# Patient Record
Sex: FEMALE | Race: BLACK OR AFRICAN AMERICAN | Employment: UNEMPLOYED | URBAN - METROPOLITAN AREA
[De-identification: names, ages, dates, MRNs, and addresses within clinical notes are randomized per-mention and may not be internally consistent; named-entity substitution may affect disease eponyms.]

---

## 2018-05-25 ENCOUNTER — HOSPITAL ENCOUNTER (EMERGENCY)
Age: 63
Discharge: STILL ADMITTED OR ADMINISTRATIVE | End: 2018-05-25
Attending: EMERGENCY MEDICINE
Payer: OTHER GOVERNMENT

## 2018-05-25 PROCEDURE — 75810000275 HC EMERGENCY DEPT VISIT NO LEVEL OF CARE

## 2019-03-31 ENCOUNTER — HOSPITAL ENCOUNTER (EMERGENCY)
Age: 64
Discharge: HOME | End: 2019-03-31
Attending: EMERGENCY MEDICINE | Admitting: EMERGENCY MEDICINE
Payer: COMMERCIAL

## 2019-03-31 ENCOUNTER — APPOINTMENT (OUTPATIENT)
Dept: CT IMAGING | Age: 64
End: 2019-03-31
Attending: EMERGENCY MEDICINE
Payer: COMMERCIAL

## 2019-03-31 VITALS
DIASTOLIC BLOOD PRESSURE: 79 MMHG | RESPIRATION RATE: 15 BRPM | OXYGEN SATURATION: 99 % | TEMPERATURE: 98 F | SYSTOLIC BLOOD PRESSURE: 147 MMHG | HEIGHT: 59 IN | HEART RATE: 68 BPM | WEIGHT: 150 LBS | BODY MASS INDEX: 30.24 KG/M2

## 2019-03-31 DIAGNOSIS — R10.32 ABDOMINAL PAIN, LLQ (LEFT LOWER QUADRANT): Primary | ICD-10-CM

## 2019-03-31 LAB
ALBUMIN SERPL-MCNC: 4.2 G/DL (ref 3.4–5)
ALBUMIN/GLOB SERPL: 1 {RATIO} (ref 0.8–1.7)
ALP SERPL-CCNC: 130 U/L (ref 45–117)
ALT SERPL-CCNC: 21 U/L (ref 13–56)
ANION GAP SERPL CALC-SCNC: 4 MMOL/L (ref 3–18)
APPEARANCE UR: CLEAR
AST SERPL-CCNC: 21 U/L (ref 15–37)
BASOPHILS # BLD: 0 K/UL (ref 0–0.1)
BASOPHILS NFR BLD: 0 % (ref 0–2)
BILIRUB SERPL-MCNC: 0.5 MG/DL (ref 0.2–1)
BILIRUB UR QL: NEGATIVE
BUN SERPL-MCNC: 14 MG/DL (ref 7–18)
BUN/CREAT SERPL: 19 (ref 12–20)
CALCIUM SERPL-MCNC: 8.4 MG/DL (ref 8.5–10.1)
CHLORIDE SERPL-SCNC: 107 MMOL/L (ref 100–108)
CO2 SERPL-SCNC: 26 MMOL/L (ref 21–32)
COLOR UR: YELLOW
CREAT SERPL-MCNC: 0.74 MG/DL (ref 0.6–1.3)
DIFFERENTIAL METHOD BLD: NORMAL
EOSINOPHIL # BLD: 0 K/UL (ref 0–0.4)
EOSINOPHIL NFR BLD: 1 % (ref 0–5)
EPITH CASTS URNS QL MICRO: NORMAL /LPF (ref 0–5)
ERYTHROCYTE [DISTWIDTH] IN BLOOD BY AUTOMATED COUNT: 13.7 % (ref 11.6–14.5)
GLOBULIN SER CALC-MCNC: 4.3 G/DL (ref 2–4)
GLUCOSE SERPL-MCNC: 74 MG/DL (ref 74–99)
GLUCOSE UR STRIP.AUTO-MCNC: NEGATIVE MG/DL
HCT VFR BLD AUTO: 42.1 % (ref 35–45)
HGB BLD-MCNC: 13.8 G/DL (ref 12–16)
HGB UR QL STRIP: ABNORMAL
KETONES UR QL STRIP.AUTO: ABNORMAL MG/DL
LEUKOCYTE ESTERASE UR QL STRIP.AUTO: NEGATIVE
LIPASE SERPL-CCNC: 93 U/L (ref 73–393)
LYMPHOCYTES # BLD: 1.9 K/UL (ref 0.9–3.6)
LYMPHOCYTES NFR BLD: 31 % (ref 21–52)
MCH RBC QN AUTO: 28.5 PG (ref 24–34)
MCHC RBC AUTO-ENTMCNC: 32.8 G/DL (ref 31–37)
MCV RBC AUTO: 86.8 FL (ref 74–97)
MONOCYTES # BLD: 0.4 K/UL (ref 0.05–1.2)
MONOCYTES NFR BLD: 6 % (ref 3–10)
NEUTS SEG # BLD: 3.9 K/UL (ref 1.8–8)
NEUTS SEG NFR BLD: 62 % (ref 40–73)
NITRITE UR QL STRIP.AUTO: NEGATIVE
PH UR STRIP: 5 [PH] (ref 5–8)
PLATELET # BLD AUTO: 366 K/UL (ref 135–420)
PMV BLD AUTO: 11.5 FL (ref 9.2–11.8)
POTASSIUM SERPL-SCNC: 4 MMOL/L (ref 3.5–5.5)
PROT SERPL-MCNC: 8.5 G/DL (ref 6.4–8.2)
PROT UR STRIP-MCNC: NEGATIVE MG/DL
RBC # BLD AUTO: 4.85 M/UL (ref 4.2–5.3)
RBC #/AREA URNS HPF: NORMAL /HPF (ref 0–5)
SODIUM SERPL-SCNC: 137 MMOL/L (ref 136–145)
SP GR UR REFRACTOMETRY: 1.02 (ref 1–1.03)
TROPONIN I SERPL-MCNC: <0.02 NG/ML (ref 0–0.04)
UROBILINOGEN UR QL STRIP.AUTO: 0.2 EU/DL (ref 0.2–1)
WBC # BLD AUTO: 6.3 K/UL (ref 4.6–13.2)
WBC URNS QL MICRO: NORMAL /HPF (ref 0–5)

## 2019-03-31 PROCEDURE — 84484 ASSAY OF TROPONIN QUANT: CPT

## 2019-03-31 PROCEDURE — 74011250636 HC RX REV CODE- 250/636: Performed by: EMERGENCY MEDICINE

## 2019-03-31 PROCEDURE — 96374 THER/PROPH/DIAG INJ IV PUSH: CPT

## 2019-03-31 PROCEDURE — 99284 EMERGENCY DEPT VISIT MOD MDM: CPT

## 2019-03-31 PROCEDURE — 83690 ASSAY OF LIPASE: CPT

## 2019-03-31 PROCEDURE — 74177 CT ABD & PELVIS W/CONTRAST: CPT

## 2019-03-31 PROCEDURE — 74011636320 HC RX REV CODE- 636/320: Performed by: EMERGENCY MEDICINE

## 2019-03-31 PROCEDURE — 96375 TX/PRO/DX INJ NEW DRUG ADDON: CPT

## 2019-03-31 PROCEDURE — 81001 URINALYSIS AUTO W/SCOPE: CPT

## 2019-03-31 PROCEDURE — 85025 COMPLETE CBC W/AUTO DIFF WBC: CPT

## 2019-03-31 PROCEDURE — 80053 COMPREHEN METABOLIC PANEL: CPT

## 2019-03-31 RX ORDER — MORPHINE SULFATE 4 MG/ML
4 INJECTION INTRAVENOUS
Status: DISCONTINUED | OUTPATIENT
Start: 2019-03-31 | End: 2019-03-31 | Stop reason: HOSPADM

## 2019-03-31 RX ORDER — MORPHINE SULFATE 4 MG/ML
4 INJECTION INTRAVENOUS
Status: COMPLETED | OUTPATIENT
Start: 2019-03-31 | End: 2019-03-31

## 2019-03-31 RX ORDER — ONDANSETRON 4 MG/1
8 TABLET, FILM COATED ORAL
Qty: 12 TAB | Refills: 0 | Status: SHIPPED | OUTPATIENT
Start: 2019-03-31

## 2019-03-31 RX ORDER — ONDANSETRON 2 MG/ML
4 INJECTION INTRAMUSCULAR; INTRAVENOUS
Status: COMPLETED | OUTPATIENT
Start: 2019-03-31 | End: 2019-03-31

## 2019-03-31 RX ORDER — DICYCLOMINE HYDROCHLORIDE 10 MG/1
10 CAPSULE ORAL 4 TIMES DAILY
Qty: 20 CAP | Refills: 0 | Status: SHIPPED | OUTPATIENT
Start: 2019-03-31 | End: 2019-04-05

## 2019-03-31 RX ADMIN — SODIUM CHLORIDE 1000 ML: 900 INJECTION, SOLUTION INTRAVENOUS at 09:02

## 2019-03-31 RX ADMIN — ONDANSETRON 4 MG: 2 INJECTION INTRAMUSCULAR; INTRAVENOUS at 09:04

## 2019-03-31 RX ADMIN — IOPAMIDOL 100 ML: 612 INJECTION, SOLUTION INTRAVENOUS at 09:58

## 2019-03-31 RX ADMIN — MORPHINE SULFATE 4 MG: 4 INJECTION INTRAVENOUS at 09:11

## 2019-03-31 NOTE — ED PROVIDER NOTES
EMERGENCY DEPARTMENT HISTORY AND PHYSICAL EXAM 
 
Date: 3/31/2019 Patient Name: [facility] History of Presenting Illness Chief Complaint Patient presents with  Abdominal Pain History Provided By: Patient and Boyfriend Additional History (Context): [facility] is a 66 y.o. female with hypertension and obesity who presents with left lower quadrant abdominal pain since late last night. Denies nausea vomiting or diarrhea. Does have a history of a hysterectomy. Is having normal bowel movements. Denies melena hematochezia dysuria fever or flank pain. Patient says she has never had a colonoscopy before. PCP: Steve Rodriguez MD 
 
Current Facility-Administered Medications Medication Dose Route Frequency Provider Last Rate Last Dose  morphine injection 4 mg  4 mg IntraVENous NOW Karin Vasquez PA Current Outpatient Medications Medication Sig Dispense Refill  ondansetron hcl (ZOFRAN) 4 mg tablet Take 2 Tabs by mouth every eight (8) hours as needed for Nausea. 12 Tab 0  
 dicyclomine (BENTYL) 10 mg capsule Take 1 Cap by mouth four (4) times daily for 5 days. 20 Cap 0 Past History Past Medical History: 
Past Medical History:  
Diagnosis Date  Hypertension Past Surgical History: 
History reviewed. No pertinent surgical history. Family History: 
History reviewed. No pertinent family history. Social History: 
Social History Tobacco Use  Smoking status: Current Every Day Smoker Packs/day: 1.00  Smokeless tobacco: Never Used Substance Use Topics  Alcohol use: Yes Comment: socially  Drug use: Never Allergies: Allergies Allergen Reactions  Benadryl [Diphenhydramine Hcl] Hives Review of Systems Review of Systems Constitutional: Negative for chills, diaphoresis, fatigue and fever. Respiratory: Negative for shortness of breath. Cardiovascular: Negative for chest pain. Gastrointestinal: Positive for abdominal pain. Negative for constipation, diarrhea, nausea and vomiting. All Other Systems Negative Physical Exam  
 
Vitals:  
 03/31/19 0832 03/31/19 0840 03/31/19 0900 BP: (!) 162/98 Pulse: 73 Resp: 12 Temp: 98.5 °F (36.9 °C) SpO2: 96% 97% 97% Weight: 68 kg (150 lb) Height: 4' 11\" (1.499 m) Physical Exam  
Constitutional: She is oriented to person, place, and time. She appears well-developed. HENT:  
Head: Normocephalic and atraumatic. Eyes: Pupils are equal, round, and reactive to light. Neck: No JVD present. No tracheal deviation present. No thyromegaly present. Cardiovascular: Normal rate, regular rhythm and normal heart sounds. Exam reveals no gallop and no friction rub. No murmur heard. Pulmonary/Chest: Effort normal and breath sounds normal. No stridor. No respiratory distress. She has no wheezes. She has no rales. She exhibits no tenderness. Abdominal: Soft. She exhibits no distension and no mass. There is tenderness. There is no rebound and no guarding. Moderate left lower quadrant abdominal tenderness to palpation Musculoskeletal: She exhibits no edema or tenderness. Lymphadenopathy:  
  She has no cervical adenopathy. Neurological: She is alert and oriented to person, place, and time. Skin: Skin is warm and dry. No rash noted. No erythema. No pallor. Psychiatric: She has a normal mood and affect. Her behavior is normal. Thought content normal.  
Nursing note and vitals reviewed. Diagnostic Study Results Labs - Recent Results (from the past 12 hour(s)) CBC WITH AUTOMATED DIFF Collection Time: 03/31/19  8:50 AM  
Result Value Ref Range WBC 6.3 4.6 - 13.2 K/uL  
 RBC 4.85 4.20 - 5.30 M/uL  
 HGB 13.8 12.0 - 16.0 g/dL HCT 42.1 35.0 - 45.0 % MCV 86.8 74.0 - 97.0 FL  
 MCH 28.5 24.0 - 34.0 PG  
 MCHC 32.8 31.0 - 37.0 g/dL  
 RDW 13.7 11.6 - 14.5 % PLATELET 748 980 - 002 K/uL MPV 11.5 9.2 - 11.8 FL  
 NEUTROPHILS 62 40 - 73 % LYMPHOCYTES 31 21 - 52 % MONOCYTES 6 3 - 10 % EOSINOPHILS 1 0 - 5 % BASOPHILS 0 0 - 2 %  
 ABS. NEUTROPHILS 3.9 1.8 - 8.0 K/UL  
 ABS. LYMPHOCYTES 1.9 0.9 - 3.6 K/UL  
 ABS. MONOCYTES 0.4 0.05 - 1.2 K/UL  
 ABS. EOSINOPHILS 0.0 0.0 - 0.4 K/UL  
 ABS. BASOPHILS 0.0 0.0 - 0.1 K/UL  
 DF AUTOMATED METABOLIC PANEL, COMPREHENSIVE Collection Time: 03/31/19  8:50 AM  
Result Value Ref Range Sodium 137 136 - 145 mmol/L Potassium 4.0 3.5 - 5.5 mmol/L Chloride 107 100 - 108 mmol/L  
 CO2 26 21 - 32 mmol/L Anion gap 4 3.0 - 18 mmol/L Glucose 74 74 - 99 mg/dL BUN 14 7.0 - 18 MG/DL Creatinine 0.74 0.6 - 1.3 MG/DL  
 BUN/Creatinine ratio 19 12 - 20 GFR est AA >60 >60 ml/min/1.73m2 GFR est non-AA >60 >60 ml/min/1.73m2 Calcium 8.4 (L) 8.5 - 10.1 MG/DL Bilirubin, total 0.5 0.2 - 1.0 MG/DL  
 ALT (SGPT) 21 13 - 56 U/L  
 AST (SGOT) 21 15 - 37 U/L Alk. phosphatase 130 (H) 45 - 117 U/L Protein, total 8.5 (H) 6.4 - 8.2 g/dL Albumin 4.2 3.4 - 5.0 g/dL Globulin 4.3 (H) 2.0 - 4.0 g/dL A-G Ratio 1.0 0.8 - 1.7 LIPASE Collection Time: 03/31/19  8:50 AM  
Result Value Ref Range Lipase 93 73 - 393 U/L  
TROPONIN I Collection Time: 03/31/19  8:50 AM  
Result Value Ref Range Troponin-I, QT <0.02 0.0 - 0.045 NG/ML  
URINALYSIS W/ RFLX MICROSCOPIC Collection Time: 03/31/19  8:50 AM  
Result Value Ref Range Color YELLOW Appearance CLEAR Specific gravity 1.020 1.005 - 1.030    
 pH (UA) 5.0 5.0 - 8.0 Protein NEGATIVE  NEG mg/dL Glucose NEGATIVE  NEG mg/dL Ketone TRACE (A) NEG mg/dL Bilirubin NEGATIVE  NEG Blood TRACE (A) NEG Urobilinogen 0.2 0.2 - 1.0 EU/dL Nitrites NEGATIVE  NEG Leukocyte Esterase NEGATIVE  NEG    
URINE MICROSCOPIC ONLY Collection Time: 03/31/19  8:50 AM  
Result Value Ref Range  WBC 0 to 1 0 - 5 /hpf  
 RBC 1 to 3 0 - 5 /hpf  
 Epithelial cells 1+ 0 - 5 /lpf Radiologic Studies -  
CT ABD PELV W CONT Final Result IMPRESSION:  
  
1. Subtle mesenteric abnormalities might indicate mild enteritis in the small  
bowel that is mostly located in the left lower abdomen and pelvis. This may also  
represent normal variation. No associated wall thickening or dilation. Correlate  
with symptomatology. No other acute findings. 2. Mild diverticulosis. 3. Small fatty incisional abdominal wall hernia. 4. Small renal cysts. 5. Cholelithiasis. CT Results  (Last 48 hours) 03/31/19 1021  CT ABD PELV W CONT Final result Impression:  IMPRESSION:  
   
1. Subtle mesenteric abnormalities might indicate mild enteritis in the small  
bowel that is mostly located in the left lower abdomen and pelvis. This may also  
represent normal variation. No associated wall thickening or dilation. Correlate  
with symptomatology. No other acute findings. 2. Mild diverticulosis. 3. Small fatty incisional abdominal wall hernia. 4. Small renal cysts. 5. Cholelithiasis. Narrative:  CT ABDOMEN AND PELVIS WITH ENHANCEMENT INDICATION: Left lower quadrant pain. TECHNIQUE: Axial images obtained of the abdomen and pelvis following the  
uneventful administration of  100 cc Isovue-300 nonionic intravenous contrast.   
Coronal and sagittal reformatted images were obtained. All CT scans are  
performed using dose optimization techniques as appropriate to the performed  
exam including the following: Automated exposure control, adjustment of mA  
and/or kV according to patient size, and use of iterative reconstructive  
technique. COMPARISON: 1/6/2016. ABDOMEN FINDINGS:   
   
Lung Base: Unremarkable. Liver: Negative. Biliary: Cholelithiasis. No ductal dilation. Spleen: Unremarkable. Pancreas: Unremarkable. Adrenal Glands: Unremarkable. Kidneys: Stable 9 mm right upper pole cyst and 8mm left upper pole cyst.  
   
Peritoneum/ Retroperitoneum: Unremarkable. Lymph Nodes: Unremarkable. Vessels: Unremarkable for age. PELVIS FINDINGS:   
   
Bowel: Mild diverticulosis. Normal appendix. No dilated bowel or wall  
thickening. There is mild mesenteric engorgement along the collapsed small bowel  
which is most greatly clustered in the left lower quadrant. Bladder/ Pelvic Organs: Hysterectomy. Bladder is unremarkable. Bones/Soft tissues: Mild midline supraumbilical fatty ventral wall hernia. Surgical scarring noted. CXR Results  (Last 48 hours) None Medical Decision Making I am the first provider for this patient. I reviewed the vital signs, available nursing notes, past medical history, past surgical history, family history and social history. Vital Signs-Reviewed the patient's vital signs. Records Reviewed: Nursing Notes, Old Medical Records, Previous Radiology Studies and Previous Laboratory Studies Procedures: 
Procedures Provider Notes (Medical Decision Making): Check labs, give meds for symptoms, send urine, and sent for CT scan. Reassess. CT and labs that show nothing acute. Will send home with Zofran and Bentyl. Not having any nausea vomiting or diarrhea. Can follow-up with her PCP. MED RECONCILIATION: 
Current Facility-Administered Medications Medication Dose Route Frequency  morphine injection 4 mg  4 mg IntraVENous NOW Current Outpatient Medications Medication Sig  
 ondansetron hcl (ZOFRAN) 4 mg tablet Take 2 Tabs by mouth every eight (8) hours as needed for Nausea.  dicyclomine (BENTYL) 10 mg capsule Take 1 Cap by mouth four (4) times daily for 5 days. Disposition: 
home DISCHARGE NOTE:  
11:07 AM 
 
Pt has been reexamined.   Patient has no new complaints, changes, or physical findings. Care plan outlined and precautions discussed. Results of labs, CT were reviewed with the patient. All medications were reviewed with the patient; will d/c home with zofran, bentyl. All of pt's questions and concerns were addressed. Patient was instructed and agrees to follow up with PCP, as well as to return to the ED upon further deterioration. Patient is ready to go home. Follow-up Information Follow up With Specialties Details Why Contact Info Demetrius Constantino MD Internal Medicine Schedule an appointment as soon as possible for a visit in 1 day  [address] [place] [address] 
[phone] [facility] EMERGENCY DEPT Emergency Medicine  If symptoms worsen return immediately [address] Current Discharge Medication List  
  
START taking these medications Details  
ondansetron hcl (ZOFRAN) 4 mg tablet Take 2 Tabs by mouth every eight (8) hours as needed for Nausea. Qty: 12 Tab, Refills: 0  
  
dicyclomine (BENTYL) 10 mg capsule Take 1 Cap by mouth four (4) times daily for 5 days. Qty: 20 Cap, Refills: 0 Diagnosis Clinical Impression: 1. Abdominal pain, LLQ (left lower quadrant)

## 2019-03-31 NOTE — ED NOTES
Received patient room 1. Patient appears in no distress. Reviewed poc with the patient and her significant other. Questions encouraged and answered. Patient verbalized an understanding

## 2019-03-31 NOTE — DISCHARGE INSTRUCTIONS

## 2019-03-31 NOTE — ED TRIAGE NOTES
\"I had a hysterectomy three years ago and the part around where the cut is hurting. It feels like my monthly cramps. It started last night. \"  Denies nausea, vomiting, diarrhea, constipation, or urinary frequency. Last BM was this morning.

## 2020-08-19 ENCOUNTER — HOSPITAL ENCOUNTER (INPATIENT)
Age: 65
LOS: 10 days | Discharge: NURSING, REHAB OR LONG-TERM CARE | DRG: 040 | End: 2020-08-29
Attending: EMERGENCY MEDICINE | Admitting: HOSPITALIST
Payer: COMMERCIAL

## 2020-08-19 ENCOUNTER — APPOINTMENT (OUTPATIENT)
Dept: GENERAL RADIOLOGY | Age: 65
DRG: 040 | End: 2020-08-19
Attending: HEALTH CARE PROVIDER
Payer: COMMERCIAL

## 2020-08-19 ENCOUNTER — APPOINTMENT (OUTPATIENT)
Dept: CT IMAGING | Age: 65
DRG: 040 | End: 2020-08-19
Attending: EMERGENCY MEDICINE
Payer: COMMERCIAL

## 2020-08-19 ENCOUNTER — HOSPITAL ENCOUNTER (EMERGENCY)
Age: 65
Discharge: STILL ADMITTED OR ADMINISTRATIVE | End: 2020-08-19
Attending: EMERGENCY MEDICINE

## 2020-08-19 ENCOUNTER — APPOINTMENT (OUTPATIENT)
Dept: CT IMAGING | Age: 65
DRG: 040 | End: 2020-08-19
Attending: HEALTH CARE PROVIDER
Payer: COMMERCIAL

## 2020-08-19 DIAGNOSIS — I63.9 CEREBROVASCULAR ACCIDENT (CVA), UNSPECIFIED MECHANISM (HCC): Primary | ICD-10-CM

## 2020-08-19 DIAGNOSIS — Z71.89 GOALS OF CARE, COUNSELING/DISCUSSION: ICD-10-CM

## 2020-08-19 DIAGNOSIS — R77.8 ELEVATED TROPONIN I LEVEL: ICD-10-CM

## 2020-08-19 DIAGNOSIS — R13.10 DYSPHAGIA, UNSPECIFIED TYPE: ICD-10-CM

## 2020-08-19 DIAGNOSIS — R53.81 DEBILITY: ICD-10-CM

## 2020-08-19 DIAGNOSIS — I50.9 CONGESTIVE HEART FAILURE, UNSPECIFIED HF CHRONICITY, UNSPECIFIED HEART FAILURE TYPE (HCC): ICD-10-CM

## 2020-08-19 PROBLEM — L03.90 CELLULITIS: Status: ACTIVE | Noted: 2020-08-19

## 2020-08-19 LAB
ALBUMIN SERPL-MCNC: 3.5 G/DL (ref 3.4–5)
ALBUMIN/GLOB SERPL: 0.8 {RATIO} (ref 0.8–1.7)
ALP SERPL-CCNC: 117 U/L (ref 45–117)
ALT SERPL-CCNC: 65 U/L (ref 13–56)
ANION GAP SERPL CALC-SCNC: 10 MMOL/L (ref 3–18)
AST SERPL-CCNC: 47 U/L (ref 10–38)
BASOPHILS # BLD: 0 K/UL (ref 0–0.1)
BASOPHILS NFR BLD: 0 % (ref 0–2)
BILIRUB SERPL-MCNC: 1.7 MG/DL (ref 0.2–1)
BNP SERPL-MCNC: ABNORMAL PG/ML (ref 0–900)
BUN SERPL-MCNC: 17 MG/DL (ref 7–18)
BUN/CREAT SERPL: 18 (ref 12–20)
CALCIUM SERPL-MCNC: 8.9 MG/DL (ref 8.5–10.1)
CHLORIDE SERPL-SCNC: 114 MMOL/L (ref 100–111)
CK MB CFR SERPL CALC: 0.7 % (ref 0–4)
CK MB CFR SERPL CALC: 1.1 % (ref 0–4)
CK MB SERPL-MCNC: 5.5 NG/ML (ref 5–25)
CK MB SERPL-MCNC: 7.7 NG/ML (ref 5–25)
CK SERPL-CCNC: 689 U/L (ref 26–192)
CK SERPL-CCNC: 778 U/L (ref 26–192)
CO2 SERPL-SCNC: 23 MMOL/L (ref 21–32)
CREAT SERPL-MCNC: 0.94 MG/DL (ref 0.6–1.3)
CRP SERPL-MCNC: 10.1 MG/DL (ref 0–0.3)
D DIMER PPP FEU-MCNC: 14.83 UG/ML(FEU)
DIFFERENTIAL METHOD BLD: ABNORMAL
EOSINOPHIL # BLD: 0 K/UL (ref 0–0.4)
EOSINOPHIL NFR BLD: 0 % (ref 0–5)
ERYTHROCYTE [DISTWIDTH] IN BLOOD BY AUTOMATED COUNT: 15.5 % (ref 11.6–14.5)
ETHANOL SERPL-MCNC: <3 MG/DL (ref 0–3)
FERRITIN SERPL-MCNC: 79 NG/ML (ref 8–388)
GLOBULIN SER CALC-MCNC: 4.3 G/DL (ref 2–4)
GLUCOSE SERPL-MCNC: 113 MG/DL (ref 74–99)
HCT VFR BLD AUTO: 47.2 % (ref 35–45)
HGB BLD-MCNC: 15.5 G/DL (ref 12–16)
INR PPP: 1.2 (ref 0.8–1.2)
LDH SERPL L TO P-CCNC: 348 U/L (ref 81–234)
LYMPHOCYTES # BLD: 1.6 K/UL (ref 0.9–3.6)
LYMPHOCYTES NFR BLD: 12 % (ref 21–52)
MCH RBC QN AUTO: 28.3 PG (ref 24–34)
MCHC RBC AUTO-ENTMCNC: 32.8 G/DL (ref 31–37)
MCV RBC AUTO: 86.3 FL (ref 74–97)
MONOCYTES # BLD: 1.5 K/UL (ref 0.05–1.2)
MONOCYTES NFR BLD: 11 % (ref 3–10)
NEUTS SEG # BLD: 10.2 K/UL (ref 1.8–8)
NEUTS SEG NFR BLD: 77 % (ref 40–73)
PLATELET # BLD AUTO: 237 K/UL (ref 135–420)
PMV BLD AUTO: 11.8 FL (ref 9.2–11.8)
POTASSIUM SERPL-SCNC: 3.6 MMOL/L (ref 3.5–5.5)
PROCALCITONIN SERPL-MCNC: <0.05 NG/ML
PROT SERPL-MCNC: 7.8 G/DL (ref 6.4–8.2)
PROTHROMBIN TIME: 15.3 SEC (ref 11.5–15.2)
RBC # BLD AUTO: 5.47 M/UL (ref 4.2–5.3)
SODIUM SERPL-SCNC: 147 MMOL/L (ref 136–145)
TROPONIN I SERPL-MCNC: 0.43 NG/ML (ref 0–0.04)
TROPONIN I SERPL-MCNC: 0.56 NG/ML (ref 0–0.04)
WBC # BLD AUTO: 13.2 K/UL (ref 4.6–13.2)

## 2020-08-19 PROCEDURE — 87040 BLOOD CULTURE FOR BACTERIA: CPT

## 2020-08-19 PROCEDURE — 83615 LACTATE (LD) (LDH) ENZYME: CPT

## 2020-08-19 PROCEDURE — 93005 ELECTROCARDIOGRAM TRACING: CPT

## 2020-08-19 PROCEDURE — 80307 DRUG TEST PRSMV CHEM ANLYZR: CPT

## 2020-08-19 PROCEDURE — 85025 COMPLETE CBC W/AUTO DIFF WBC: CPT

## 2020-08-19 PROCEDURE — 96374 THER/PROPH/DIAG INJ IV PUSH: CPT

## 2020-08-19 PROCEDURE — 74011250636 HC RX REV CODE- 250/636: Performed by: EMERGENCY MEDICINE

## 2020-08-19 PROCEDURE — 71045 X-RAY EXAM CHEST 1 VIEW: CPT

## 2020-08-19 PROCEDURE — 86140 C-REACTIVE PROTEIN: CPT

## 2020-08-19 PROCEDURE — 85379 FIBRIN DEGRADATION QUANT: CPT

## 2020-08-19 PROCEDURE — 83880 ASSAY OF NATRIURETIC PEPTIDE: CPT

## 2020-08-19 PROCEDURE — 70450 CT HEAD/BRAIN W/O DYE: CPT

## 2020-08-19 PROCEDURE — 84145 PROCALCITONIN (PCT): CPT

## 2020-08-19 PROCEDURE — 74011000636 HC RX REV CODE- 636: Performed by: EMERGENCY MEDICINE

## 2020-08-19 PROCEDURE — 85610 PROTHROMBIN TIME: CPT

## 2020-08-19 PROCEDURE — 0042T CT PERF W CBF: CPT

## 2020-08-19 PROCEDURE — 65660000000 HC RM CCU STEPDOWN

## 2020-08-19 PROCEDURE — 74011000258 HC RX REV CODE- 258: Performed by: EMERGENCY MEDICINE

## 2020-08-19 PROCEDURE — 82728 ASSAY OF FERRITIN: CPT

## 2020-08-19 PROCEDURE — 99285 EMERGENCY DEPT VISIT HI MDM: CPT

## 2020-08-19 PROCEDURE — 82550 ASSAY OF CK (CPK): CPT

## 2020-08-19 PROCEDURE — 74011250637 HC RX REV CODE- 250/637: Performed by: EMERGENCY MEDICINE

## 2020-08-19 PROCEDURE — 80053 COMPREHEN METABOLIC PANEL: CPT

## 2020-08-19 PROCEDURE — 70496 CT ANGIOGRAPHY HEAD: CPT

## 2020-08-19 RX ORDER — ASPIRIN 600 MG/1
300 SUPPOSITORY RECTAL DAILY
Status: DISCONTINUED | OUTPATIENT
Start: 2020-08-20 | End: 2020-08-19

## 2020-08-19 RX ORDER — SODIUM CHLORIDE 0.9 % (FLUSH) 0.9 %
5-40 SYRINGE (ML) INJECTION EVERY 8 HOURS
Status: DISCONTINUED | OUTPATIENT
Start: 2020-08-19 | End: 2020-08-29 | Stop reason: HOSPADM

## 2020-08-19 RX ORDER — LABETALOL HCL 20 MG/4 ML
5 SYRINGE (ML) INTRAVENOUS
Status: DISCONTINUED | OUTPATIENT
Start: 2020-08-19 | End: 2020-08-29 | Stop reason: HOSPADM

## 2020-08-19 RX ORDER — ENOXAPARIN SODIUM 100 MG/ML
40 INJECTION SUBCUTANEOUS EVERY 24 HOURS
Status: DISCONTINUED | OUTPATIENT
Start: 2020-08-19 | End: 2020-08-20

## 2020-08-19 RX ORDER — DOCUSATE SODIUM 100 MG/1
100 CAPSULE, LIQUID FILLED ORAL
Status: DISCONTINUED | OUTPATIENT
Start: 2020-08-19 | End: 2020-08-26

## 2020-08-19 RX ORDER — LORAZEPAM 2 MG/ML
1 INJECTION INTRAMUSCULAR
Status: COMPLETED | OUTPATIENT
Start: 2020-08-19 | End: 2020-08-19

## 2020-08-19 RX ORDER — ACETAMINOPHEN 650 MG/1
650 SUPPOSITORY RECTAL
Status: DISCONTINUED | OUTPATIENT
Start: 2020-08-19 | End: 2020-08-28

## 2020-08-19 RX ORDER — FUROSEMIDE 10 MG/ML
40 INJECTION INTRAMUSCULAR; INTRAVENOUS ONCE
Status: COMPLETED | OUTPATIENT
Start: 2020-08-19 | End: 2020-08-19

## 2020-08-19 RX ORDER — ZINC SULFATE 50(220)MG
1 CAPSULE ORAL DAILY
Status: DISCONTINUED | OUTPATIENT
Start: 2020-08-20 | End: 2020-08-22

## 2020-08-19 RX ORDER — ASCORBIC ACID 250 MG
500 TABLET ORAL DAILY
Status: DISCONTINUED | OUTPATIENT
Start: 2020-08-20 | End: 2020-08-25

## 2020-08-19 RX ORDER — LANOLIN ALCOHOL/MO/W.PET/CERES
3 CREAM (GRAM) TOPICAL
Status: DISCONTINUED | OUTPATIENT
Start: 2020-08-19 | End: 2020-08-25

## 2020-08-19 RX ORDER — ATORVASTATIN CALCIUM 40 MG/1
80 TABLET, FILM COATED ORAL
Status: DISCONTINUED | OUTPATIENT
Start: 2020-08-19 | End: 2020-08-25

## 2020-08-19 RX ORDER — ASPIRIN 300 MG/1
300 SUPPOSITORY RECTAL DAILY
Status: DISCONTINUED | OUTPATIENT
Start: 2020-08-19 | End: 2020-08-24

## 2020-08-19 RX ORDER — SODIUM CHLORIDE 0.9 % (FLUSH) 0.9 %
5-40 SYRINGE (ML) INJECTION AS NEEDED
Status: DISCONTINUED | OUTPATIENT
Start: 2020-08-19 | End: 2020-08-29 | Stop reason: HOSPADM

## 2020-08-19 RX ADMIN — FUROSEMIDE 40 MG: 10 INJECTION, SOLUTION INTRAMUSCULAR; INTRAVENOUS at 21:15

## 2020-08-19 RX ADMIN — IOPAMIDOL 40 ML: 755 INJECTION, SOLUTION INTRAVENOUS at 17:22

## 2020-08-19 RX ADMIN — ASPIRIN 300 MG: 600 SUPPOSITORY RECTAL at 19:35

## 2020-08-19 RX ADMIN — Medication 10 ML: at 22:00

## 2020-08-19 RX ADMIN — IOPAMIDOL 80 ML: 755 INJECTION, SOLUTION INTRAVENOUS at 17:02

## 2020-08-19 RX ADMIN — PIPERACILLIN SODIUM AND TAZOBACTAM SODIUM 3.38 G: 3; .375 INJECTION, POWDER, LYOPHILIZED, FOR SOLUTION INTRAVENOUS at 21:15

## 2020-08-19 RX ADMIN — LORAZEPAM 1 MG: 2 INJECTION, SOLUTION INTRAMUSCULAR; INTRAVENOUS at 17:05

## 2020-08-19 NOTE — ED PROVIDER NOTES
EMERGENCY DEPARTMENT HISTORY AND PHYSICAL EXAM    3:58 PM      Date: 8/19/2020  Patient Name: [facility]    History of Presenting Illness     Chief Complaint   Patient presents with    Altered mental status         History Provided By: Patient  Location/Duration/Severity/Modifying factors   64F found down by EMS after family asked for welfare check after not hearing from her for four days. Patient found to be slurring speech and unable to use right arm/leg. Patient is alert and attempting to speak. She is able to nod \"yes\" and \"no\" to questions and is alert, oriented, and cooperative. Patient nodded Nessa Sa" to previous stroke, but records review does not reflect this in her history. Patient tachypnic and when questioned she nods \"yes\" to chest pain, and \"yes\" to COVID positive. Patient unable to provide history. She frequently nods yes to lots of questions. Overall unable to obtain any significant history from the patient.           PCP: Debbi Eaton MD    Current Facility-Administered Medications   Medication Dose Route Frequency Provider Last Rate Last Dose    aspirin (ASA) suppository 300 mg  300 mg Rectal DAILY [employer] K, DO   300 mg at 08/19/20 1935    azithromycin (ZITHROMAX) 500 mg in  mL  500 mg IntraVENous Q24H [employer] K, DO   500 mg at 08/20/20 0202    sodium chloride (NS) flush 5-40 mL  5-40 mL IntraVENous Q8H Drew Xavier MD   10 mL at 08/19/20 2200    sodium chloride (NS) flush 5-40 mL  5-40 mL IntraVENous PRN Hamida Khanna MD        docusate sodium (COLACE) capsule 100 mg  100 mg Oral BID PRN Drew Xavier MD        acetaminophen (TYLENOL) suppository 650 mg  650 mg Rectal Q4H PRN Hamida Khanna MD        labetaloL (NORMODYNE;TRANDATE) 20 mg/4 mL (5 mg/mL) injection 5 mg  5 mg IntraVENous Q10MIN PRN Hamida Khanna MD        zinc sulfate (ZINCATE) 220 (50) mg capsule 1 Cap  1 Cap Oral DAILY Gerald Dudley MD        melatonin tablet 3 mg  3 mg Oral QHS Gerald Dudley MD   Stopped at 08/19/20 2200    ascorbic acid (vitamin C) (VITAMIN C) tablet 500 mg  500 mg Oral DAILY Bobby Khanna MD        atorvastatin (LIPITOR) tablet 80 mg  80 mg Oral QHS Ck Lucas DO   Stopped at 08/19/20 2200     Current Outpatient Medications   Medication Sig Dispense Refill    ondansetron hcl (ZOFRAN) 4 mg tablet Take 2 Tabs by mouth every eight (8) hours as needed for Nausea. 12 Tab 0       Past History     Past Medical History:  Past Medical History:   Diagnosis Date    Hypertension        Past Surgical History:  History reviewed. No pertinent surgical history. Family History:  History reviewed. No pertinent family history. Social History:  Social History     Tobacco Use    Smoking status: Current Every Day Smoker     Packs/day: 1.00    Smokeless tobacco: Never Used   Substance Use Topics    Alcohol use: Yes     Comment: socially    Drug use: Never       Allergies: Allergies   Allergen Reactions    Benadryl [Diphenhydramine Hcl] Hives         Review of Systems       Review of Systems   Unable to perform ROS: Patient nonverbal (Patient unable to give extensive ROS as she is currently aphasic and only able to nod yes or no to questions.)   Constitutional: Positive for activity change. Respiratory: Positive for chest tightness. Cardiovascular: Positive for chest pain. Physical Exam     Visit Vitals  BP (!) 138/96   Pulse 84   Temp 97.4 °F (36.3 °C)   Resp 25   Ht 5' 3\" (1.6 m)   Wt 62.1 kg (137 lb)   SpO2 99%   BMI 24.27 kg/m²         Physical Exam  Vitals signs (hypertension noted.) reviewed. Constitutional:       General: She is in acute distress. Appearance: She is obese. HENT:      Head: Normocephalic and atraumatic.       Mouth/Throat:      Comments: Lips are dry and cracked  Eyes:      Extraocular Movements: Extraocular movements intact. Right eye: Normal extraocular motion. Left eye: Normal extraocular motion. Comments: Tracks partway across the midline towards the right. Does not track me all the way across the room. Neck:      Musculoskeletal: Normal range of motion and neck supple. Cardiovascular:      Rate and Rhythm: Normal rate and regular rhythm. Heart sounds: Murmur present. Pulmonary:      Breath sounds: Rhonchi and rales present. Comments: Visibly tachypneic  Abdominal:      General: Bowel sounds are normal.      Palpations: Abdomen is soft. Tenderness: There is no abdominal tenderness. There is no guarding. Musculoskeletal:         General: No swelling or tenderness. Skin:     General: Skin is warm and dry. Neurological:      Mental Status: She is alert and oriented to person, place, and time. Motor: Weakness present. Comments: Patient unable to move right upper and lower extremities. Decreased tone. Patient dysphasic. Slight withdrawal to painful stimuli in the right lower extremity. No spontaneous movement. Patient has difficulty with cooperating with cranial nerve testing. Appears to have a right-sided facial hemiparalysis.            Diagnostic Study Results     Labs -  Recent Results (from the past 12 hour(s))   EKG, 12 LEAD, SUBSEQUENT    Collection Time: 08/19/20  8:36 PM   Result Value Ref Range    Ventricular Rate 96 BPM    Atrial Rate 96 BPM    P-R Interval 152 ms    QRS Duration 142 ms    Q-T Interval 406 ms    QTC Calculation (Bezet) 512 ms    Calculated P Axis 47 degrees    Calculated R Axis 13 degrees    Calculated T Axis 70 degrees    Diagnosis       Sinus rhythm with occasional premature ventricular complexes  Biatrial enlargement  Left bundle branch block  Abnormal ECG  When compared with ECG of 19-AUG-2020 16:36,  No significant change was found     CULTURE, BLOOD    Collection Time: 08/19/20  8:40 PM    Specimen: Blood   Result Value Ref Range    Special Requests: NO SPECIAL REQUESTS      Culture result: NO GROWTH AFTER 5 HOURS     CULTURE, BLOOD    Collection Time: 08/19/20  9:00 PM    Specimen: Blood   Result Value Ref Range    Special Requests: NO SPECIAL REQUESTS      Culture result: NO GROWTH AFTER 5 HOURS     D DIMER    Collection Time: 08/19/20  9:12 PM   Result Value Ref Range    D DIMER 14.83 (H) <0.46 ug/ml(FEU)   FERRITIN    Collection Time: 08/19/20  9:12 PM   Result Value Ref Range    Ferritin 79 8 - 388 NG/ML   CK    Collection Time: 08/19/20  9:12 PM   Result Value Ref Range     (H) 26 - 192 U/L   C REACTIVE PROTEIN, QT    Collection Time: 08/19/20  9:12 PM   Result Value Ref Range    C-Reactive protein 10.1 (H) 0 - 0.3 mg/dL   PROCALCITONIN    Collection Time: 08/19/20  9:12 PM   Result Value Ref Range    Procalcitonin <0.05 ng/mL   LD    Collection Time: 08/19/20  9:12 PM   Result Value Ref Range     (H) 81 - 234 U/L   ETHYL ALCOHOL    Collection Time: 08/19/20  9:12 PM   Result Value Ref Range    ALCOHOL(ETHYL),SERUM <3 0 - 3 MG/DL   TROPONIN I    Collection Time: 08/19/20  9:12 PM   Result Value Ref Range    Troponin-I, QT 0.56 (H) 0.0 - 0.045 NG/ML   CK-MB,QT    Collection Time: 08/19/20  9:12 PM   Result Value Ref Range    CK - MB 5.5 (H) <3.6 ng/ml    CK-MB Index 0.7 0.0 - 4.0 %   URINALYSIS W/ RFLX MICROSCOPIC    Collection Time: 08/20/20  1:59 AM   Result Value Ref Range    Color YELLOW      Appearance CLEAR      Specific gravity 1.019 1.005 - 1.030      pH (UA) 5.0 5.0 - 8.0      Protein 30 (A) NEG mg/dL    Glucose Negative NEG mg/dL    Ketone Negative NEG mg/dL    Bilirubin Negative NEG      Blood SMALL (A) NEG      Urobilinogen 0.2 0.2 - 1.0 EU/dL    Nitrites Negative NEG      Leukocyte Esterase Negative NEG     DRUG SCREEN, URINE    Collection Time: 08/20/20  1:59 AM   Result Value Ref Range    BENZODIAZEPINES Negative NEG      BARBITURATES Negative NEG      THC (TH-CANNABINOL) Negative NEG      OPIATES Negative NEG PCP(PHENCYCLIDINE) Negative NEG      COCAINE Negative NEG      AMPHETAMINES Negative NEG      METHADONE Negative NEG      HDSCOM (NOTE)    SARS-COV-2    Collection Time: 08/20/20  1:59 AM   Result Value Ref Range    SARS-CoV-2 PENDING     Specimen source Nasopharyngeal      Specimen type NP Swab     URINE MICROSCOPIC ONLY    Collection Time: 08/20/20  1:59 AM   Result Value Ref Range    WBC Negative 0 - 4 /hpf    RBC 0 to 3 0 - 5 /hpf    Epithelial cells FEW 0 - 5 /lpf    Bacteria Negative NEG /hpf       Radiologic Studies -   CTA HEAD NECK W WO CONT   Final Result   Impression:      1. Patent intra- and extracranial brain arteries. Vessels surrounding the   infarcted area in the left frontoparietal region have recannulized. No evidence   of any proximal vessel cut off to the infarcted region is identified in the   proximal M1 and M2 segments      Evidence for at least left-sided moderate sized acute pulmonary embolism. Patient's report was called at 7:15 AM to the emergency room patient still   waiting for available in-house bed. CT HEAD WO CONT   Final Result   IMPRESSION[de-identified]      Acute/subacute large nonhemorrhagic left MCA territory infarction as described      Small calcific density in the medial portion may represent some calcified   embolic material. Densities along the posterior aspect of the infarcted area may   represent some thrombosed vessels. Study was called to the emergency room physician at 4:14 PM            XR CHEST PORT   Final Result   IMPRESSION:      Possible pneumonia in the right midlung. Moderately enlarged cardiac silhouette   with suspected mild interstitial infiltrate/edema. Imaging follow-up   recommended. CT PERF W CBF    (Results Pending)   MRI BRAIN WO CONT    (Results Pending)   CTA CHEST W OR W WO CONT    (Results Pending)         Medical Decision Making   I am the first provider for this patient.     I reviewed the vital signs, available nursing notes, past medical history, past surgical history, family history and social history. Vital Signs-Reviewed the patient's vital signs. EKG: Sinus rhythm with frequent PVCs at rate of 85bpm. IV block. LBBB seen in V2 and V3. EKG: EKG interpretation of August 19, 2020, 2036. This is a subsequent EKG. Sinus rhythm, occasional PVCs present. Left bundle branch block morphology. No ST segment elevation or depression. Does not meet Sgarbossa criteria for ST segment elevation or ST segment MI. Normal axis. QRS prolonged at 142. . QTC of 512. Overall dictation: Sinus rhythm, left bundle branch block and otherwise unchanged EKG from priors. Records Reviewed: Old Medical Records (Time of Review: 3:58 PM)    ED Course: Progress Notes, Reevaluation, and Consults:    ED Course as of Aug 20 0805   Wed Aug 19, 2020   1620 Report of NCHCT positive for recent MCA CVA. Neurology consulted. Patient is outside window for tPA as CT indicative of CVA occurring several days ago (subacute). [LM]   9369 Case discussed with tele-neurology. Patient made for perfusion study with CT angiogram of the head and neck. This is been ordered. Patient appears to have a large left MCA stroke, most likely the window for intervention and most certainly of the window for TPA with 4-day history. [ATTILA]   2758 Discussed with Dr. Prosper Dias at [facility] neuro intervention, he did not feel patient would be a candidate and would benefit from intervention. 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 Patient has been given aspirin. [ATTILA]   2050 Discussed the case with cardiology who recommended statin and aspirin only, no indication for heparin at this time.     [ATTILA]   2133 TROPONIN I [LM]      ED Course User Index  [ATTILA] Mandi Mabry,   [LM] Claudetta Lurie, MD       Provider Notes (Medical Decision Making):   MDM  Number of Diagnoses or Management Options  Cerebrovascular accident (CVA), unspecified mechanism ([facility] [address]):   Congestive heart failure, unspecified HF chronicity, unspecified heart failure type [facility]):   Elevated troponin I level:   Diagnosis management comments: 64F presenting with right-sided weakness and dysphasia after being found down at home for undertermined time. Family did not hear from patient for 4 days and called EMS for welfare check. Patient AAOx3 on arrival but only able to answer \"yes\" or \"no\" to questions. She is unable to move right upper and lower extremities and unable to speak full words. Patient had non-con CT head and was found to have acute/subacute large nonhemorrhagic left MCA territory infarction. It appeared that patient had been down for several days and given appearance on CT, it was felt that patient probably had the CVA several days ago, and is thus out of treatment window for tPA or thrombectomy. Patient also with diffuse rhonchi on PE with sensation of dyspnea and increased WOB. SPO2 in mid/high 90s on room air. CXR demonstrating cardiomegaly which is increased from prior exams and patient with BNP of 13,764. Patient also c/o CP but she indicated that it was more due to the dyspnea and breathing discomfort. Patient with initial troponin of 0.43. ECG without ST elevation/depression but does show intraventricular block. ECG also showing LBBB but she has had this in previous ECGs. Awaiting delta troponin and will obtain repeat ECG. Remainder of labs (CMP, CBC) relatively unremarkable. Slight elevation in CK but Cr WNLs so do not suspect significant rhabdomyolysis. Patient does appear to have a left MCA CVA. I reviewed the case with the radiologist and the neurologist requested a perfusion study. The radiologist felt there may be some viable tissue along the inferior posterior segments of the affected number. At that point I called the neuro interventional list at [facility] and discussed with Dr. Iman Anguiano.   He reviewed the films and the patient case. He felt patient was not likely to benefit from intervention. She does have abnormal anatomy of her middle cerebral vessels, and seems to have duplicated flow that may be perfusing some of the penumbra. Patient was discussed with the hospitalist, and will be admitted to the hospitalist service. I also contact the cardiologist to review the elevated troponin. Is difficult to tell patient is having any ischemic chest pain symptoms and this may be related to the stroke. Given the patient's large infarct area I be hesitant to fully anticoagulate the patient, and cardiologist on-call was in agreement with holding heparin for now. We will trend the troponins going forward. Patient be admitted to the hospitalist service for further care at this time. ED Course as of Aug 20 0805   Wed Aug 19, 2020   1620 Report of NCHCT positive for recent MCA CVA. Neurology consulted. Patient is outside window for tPA as CT indicative of CVA occurring several days ago (subacute). [LM]   3398 Case discussed with tele-neurology. Patient made for perfusion study with CT angiogram of the head and neck. This is been ordered. Patient appears to have a large left MCA stroke, most likely the window for intervention and most certainly of the window for TPA with 4-day history. [ATTILA]   8012 Discussed with Dr. Brisa Saini at [facility] neuro intervention, he did not feel patient would be a candidate and would benefit from intervention. 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 Patient has been given aspirin. [ATTILA]   2050 Discussed the case with cardiology who recommended statin and aspirin only, no indication for heparin at this time.     [ATTILA]   2130 TROPONIN I [LM]      ED Course User Index  [ATTILA] Ariella Nazario DO  [LM] Yola Moser MD         NOTES   :  I have spent 75 minutes of critical care time involved in lab review, consultations with specialist, family decision- making, bedside attention and documentation. During this entire length of time I was immediately available to the patient. Fisher [employer], DO        Diagnosis     Clinical Impression:   1. Cerebrovascular accident (CVA), unspecified mechanism ([facility] [address]    2. Elevated troponin I level    3. Congestive heart failure, unspecified HF chronicity, unspecified heart failure type ([facility] [address]        Disposition: admitted to Medicine    Follow-up Information    None          Patient's Medications   Start Taking    No medications on file   Continue Taking    ONDANSETRON HCL (ZOFRAN) 4 MG TABLET    Take 2 Tabs by mouth every eight (8) hours as needed for Nausea. These Medications have changed    No medications on file   Stop Taking    No medications on file     Disclaimer: Sections of this note are dictated using utilizing voice recognition software. Minor typographical errors may be present. If questions arise, please do not hesitate to contact me or call our department.

## 2020-08-19 NOTE — ED TRIAGE NOTES
Pt arrived EMS from home, found on the floor, and family reports last contact was 4 days ago. Pt non-verbal with right sided weakness. Empty bottle of amlodipine found at pt home.

## 2020-08-20 ENCOUNTER — APPOINTMENT (OUTPATIENT)
Dept: GENERAL RADIOLOGY | Age: 65
DRG: 040 | End: 2020-08-20
Attending: HEALTH CARE PROVIDER
Payer: COMMERCIAL

## 2020-08-20 ENCOUNTER — ANESTHESIA (OUTPATIENT)
Dept: CARDIOTHORACIC SURGERY | Age: 65
DRG: 040 | End: 2020-08-20
Payer: COMMERCIAL

## 2020-08-20 ENCOUNTER — APPOINTMENT (OUTPATIENT)
Dept: GENERAL RADIOLOGY | Age: 65
DRG: 040 | End: 2020-08-20
Attending: SURGERY
Payer: COMMERCIAL

## 2020-08-20 ENCOUNTER — APPOINTMENT (OUTPATIENT)
Dept: VASCULAR SURGERY | Age: 65
DRG: 040 | End: 2020-08-20
Attending: INTERNAL MEDICINE
Payer: COMMERCIAL

## 2020-08-20 ENCOUNTER — ANESTHESIA EVENT (OUTPATIENT)
Dept: CARDIOTHORACIC SURGERY | Age: 65
DRG: 040 | End: 2020-08-20
Payer: COMMERCIAL

## 2020-08-20 ENCOUNTER — APPOINTMENT (OUTPATIENT)
Dept: MRI IMAGING | Age: 65
DRG: 040 | End: 2020-08-20
Attending: HOSPITALIST
Payer: COMMERCIAL

## 2020-08-20 LAB
AMPHET UR QL SCN: NEGATIVE
ANION GAP SERPL CALC-SCNC: 10 MMOL/L (ref 3–18)
APPEARANCE UR: CLEAR
APTT PPP: 26.9 SEC (ref 23–36.4)
APTT PPP: 69.8 SEC (ref 23–36.4)
ATRIAL RATE: 85 BPM
ATRIAL RATE: 96 BPM
BACTERIA URNS QL MICRO: NEGATIVE /HPF
BARBITURATES UR QL SCN: NEGATIVE
BASOPHILS # BLD: 0 K/UL (ref 0–0.06)
BASOPHILS NFR BLD: 0 % (ref 0–3)
BENZODIAZ UR QL: NEGATIVE
BILIRUB UR QL: NEGATIVE
BUN SERPL-MCNC: 16 MG/DL (ref 7–18)
BUN/CREAT SERPL: 21 (ref 12–20)
CALCIUM SERPL-MCNC: 8.5 MG/DL (ref 8.5–10.1)
CALCULATED P AXIS, ECG09: 47 DEGREES
CALCULATED P AXIS, ECG09: 55 DEGREES
CALCULATED R AXIS, ECG10: 13 DEGREES
CALCULATED R AXIS, ECG10: 25 DEGREES
CALCULATED T AXIS, ECG11: 70 DEGREES
CALCULATED T AXIS, ECG11: 89 DEGREES
CANNABINOIDS UR QL SCN: NEGATIVE
CHLORIDE SERPL-SCNC: 113 MMOL/L (ref 100–111)
CHOLEST SERPL-MCNC: 153 MG/DL
CK MB CFR SERPL CALC: 0.5 % (ref 0–4)
CK MB SERPL-MCNC: 3.4 NG/ML (ref 5–25)
CK SERPL-CCNC: 647 U/L (ref 26–192)
CO2 SERPL-SCNC: 27 MMOL/L (ref 21–32)
COCAINE UR QL SCN: NEGATIVE
COLOR UR: YELLOW
COVID-19 RAPID TEST, COVR: NOT DETECTED
CREAT SERPL-MCNC: 0.75 MG/DL (ref 0.6–1.3)
CRP SERPL-MCNC: 15.9 MG/DL (ref 0–0.3)
D DIMER PPP FEU-MCNC: 16.61 UG/ML(FEU)
DIAGNOSIS, 93000: NORMAL
DIAGNOSIS, 93000: NORMAL
DIFFERENTIAL METHOD BLD: ABNORMAL
EOSINOPHIL # BLD: 0 K/UL (ref 0–0.4)
EOSINOPHIL NFR BLD: 0 % (ref 0–5)
EPITH CASTS URNS QL MICRO: NORMAL /LPF (ref 0–5)
ERYTHROCYTE [DISTWIDTH] IN BLOOD BY AUTOMATED COUNT: 15.7 % (ref 11.6–14.5)
EST. AVERAGE GLUCOSE BLD GHB EST-MCNC: 117 MG/DL
FERRITIN SERPL-MCNC: 111 NG/ML (ref 8–388)
GLUCOSE SERPL-MCNC: 99 MG/DL (ref 74–99)
GLUCOSE UR STRIP.AUTO-MCNC: NEGATIVE MG/DL
HBA1C MFR BLD: 5.7 % (ref 4.2–5.6)
HCT VFR BLD AUTO: 48.7 % (ref 35–45)
HDLC SERPL-MCNC: 45 MG/DL (ref 40–60)
HDLC SERPL: 3.4 {RATIO} (ref 0–5)
HDSCOM,HDSCOM: NORMAL
HGB BLD-MCNC: 15.9 G/DL (ref 12–16)
HGB UR QL STRIP: ABNORMAL
KETONES UR QL STRIP.AUTO: NEGATIVE MG/DL
LDH SERPL L TO P-CCNC: 357 U/L (ref 81–234)
LDLC SERPL CALC-MCNC: 88.2 MG/DL (ref 0–100)
LEUKOCYTE ESTERASE UR QL STRIP.AUTO: NEGATIVE
LIPID PROFILE,FLP: NORMAL
LYMPHOCYTES # BLD: 1.3 K/UL (ref 0.8–3.5)
LYMPHOCYTES NFR BLD: 7 % (ref 20–51)
MAGNESIUM SERPL-MCNC: 2.1 MG/DL (ref 1.6–2.6)
MCH RBC QN AUTO: 28.3 PG (ref 24–34)
MCHC RBC AUTO-ENTMCNC: 32.6 G/DL (ref 31–37)
MCV RBC AUTO: 86.8 FL (ref 74–97)
METHADONE UR QL: NEGATIVE
MONOCYTES # BLD: 0.9 K/UL (ref 0–1)
MONOCYTES NFR BLD: 5 % (ref 2–9)
NEUTS SEG # BLD: 15.9 K/UL (ref 1.8–8)
NEUTS SEG NFR BLD: 88 % (ref 42–75)
NITRITE UR QL STRIP.AUTO: NEGATIVE
OPIATES UR QL: NEGATIVE
P-R INTERVAL, ECG05: 142 MS
P-R INTERVAL, ECG05: 152 MS
PCP UR QL: NEGATIVE
PH UR STRIP: 5 [PH] (ref 5–8)
PHOSPHATE SERPL-MCNC: 3.8 MG/DL (ref 2.5–4.9)
PLATELET # BLD AUTO: 193 K/UL (ref 135–420)
PLATELET COMMENTS,PCOM: ABNORMAL
PMV BLD AUTO: 11.8 FL (ref 9.2–11.8)
POTASSIUM SERPL-SCNC: 3 MMOL/L (ref 3.5–5.5)
PROCALCITONIN SERPL-MCNC: 0.24 NG/ML
PROT UR STRIP-MCNC: 30 MG/DL
Q-T INTERVAL, ECG07: 406 MS
Q-T INTERVAL, ECG07: 422 MS
QRS DURATION, ECG06: 140 MS
QRS DURATION, ECG06: 142 MS
QTC CALCULATION (BEZET), ECG08: 502 MS
QTC CALCULATION (BEZET), ECG08: 512 MS
RBC # BLD AUTO: 5.61 M/UL (ref 4.2–5.3)
RBC #/AREA URNS HPF: NORMAL /HPF (ref 0–5)
RBC MORPH BLD: ABNORMAL
RBC MORPH BLD: ABNORMAL
SODIUM SERPL-SCNC: 150 MMOL/L (ref 136–145)
SOURCE, COVRS: NORMAL
SP GR UR REFRACTOMETRY: 1.02 (ref 1–1.03)
SPECIMEN TYPE, XMCV1T: NORMAL
TRIGL SERPL-MCNC: 99 MG/DL (ref ?–150)
TROPONIN I SERPL-MCNC: 0.4 NG/ML (ref 0–0.04)
UROBILINOGEN UR QL STRIP.AUTO: 0.2 EU/DL (ref 0.2–1)
VENTRICULAR RATE, ECG03: 85 BPM
VENTRICULAR RATE, ECG03: 96 BPM
VLDLC SERPL CALC-MCNC: 19.8 MG/DL
WBC # BLD AUTO: 18.1 K/UL (ref 4.6–13.2)
WBC URNS QL MICRO: NEGATIVE /HPF (ref 0–4)

## 2020-08-20 PROCEDURE — 87635 SARS-COV-2 COVID-19 AMP PRB: CPT

## 2020-08-20 PROCEDURE — 74011250636 HC RX REV CODE- 250/636: Performed by: SURGERY

## 2020-08-20 PROCEDURE — 36415 COLL VENOUS BLD VENIPUNCTURE: CPT

## 2020-08-20 PROCEDURE — 85379 FIBRIN DEGRADATION QUANT: CPT

## 2020-08-20 PROCEDURE — 76060000032 HC ANESTHESIA 0.5 TO 1 HR: Performed by: SURGERY

## 2020-08-20 PROCEDURE — 74011250636 HC RX REV CODE- 250/636: Performed by: HOSPITALIST

## 2020-08-20 PROCEDURE — 81001 URINALYSIS AUTO W/SCOPE: CPT

## 2020-08-20 PROCEDURE — 80307 DRUG TEST PRSMV CHEM ANLYZR: CPT

## 2020-08-20 PROCEDURE — 85730 THROMBOPLASTIN TIME PARTIAL: CPT

## 2020-08-20 PROCEDURE — 82728 ASSAY OF FERRITIN: CPT

## 2020-08-20 PROCEDURE — C1769 GUIDE WIRE: HCPCS | Performed by: SURGERY

## 2020-08-20 PROCEDURE — 93970 EXTREMITY STUDY: CPT

## 2020-08-20 PROCEDURE — 83036 HEMOGLOBIN GLYCOSYLATED A1C: CPT

## 2020-08-20 PROCEDURE — 65660000000 HC RM CCU STEPDOWN

## 2020-08-20 PROCEDURE — 92610 EVALUATE SWALLOWING FUNCTION: CPT

## 2020-08-20 PROCEDURE — 80048 BASIC METABOLIC PNL TOTAL CA: CPT

## 2020-08-20 PROCEDURE — 80061 LIPID PANEL: CPT

## 2020-08-20 PROCEDURE — 74018 RADEX ABDOMEN 1 VIEW: CPT

## 2020-08-20 PROCEDURE — 85025 COMPLETE CBC W/AUTO DIFF WBC: CPT

## 2020-08-20 PROCEDURE — 97166 OT EVAL MOD COMPLEX 45 MIN: CPT

## 2020-08-20 PROCEDURE — 74011250636 HC RX REV CODE- 250/636: Performed by: EMERGENCY MEDICINE

## 2020-08-20 PROCEDURE — 74011000636 HC RX REV CODE- 636: Performed by: SURGERY

## 2020-08-20 PROCEDURE — 86140 C-REACTIVE PROTEIN: CPT

## 2020-08-20 PROCEDURE — 77030003390 HC NDL ANGI MRTM -A: Performed by: SURGERY

## 2020-08-20 PROCEDURE — 76450000000

## 2020-08-20 PROCEDURE — 74011250636 HC RX REV CODE- 250/636: Performed by: INTERNAL MEDICINE

## 2020-08-20 PROCEDURE — 83735 ASSAY OF MAGNESIUM: CPT

## 2020-08-20 PROCEDURE — 84100 ASSAY OF PHOSPHORUS: CPT

## 2020-08-20 PROCEDURE — 97162 PT EVAL MOD COMPLEX 30 MIN: CPT

## 2020-08-20 PROCEDURE — C1880 VENA CAVA FILTER: HCPCS | Performed by: SURGERY

## 2020-08-20 PROCEDURE — 92523 SPEECH SOUND LANG COMPREHEN: CPT

## 2020-08-20 PROCEDURE — 74011000250 HC RX REV CODE- 250: Performed by: SURGERY

## 2020-08-20 PROCEDURE — 76010000112 HC CV SURG 0.5 TO 1 HR: Performed by: SURGERY

## 2020-08-20 PROCEDURE — 76210000063 HC OR PH I REC FIRST 0.5 HR: Performed by: SURGERY

## 2020-08-20 PROCEDURE — 84145 PROCALCITONIN (PCT): CPT

## 2020-08-20 PROCEDURE — 83615 LACTATE (LD) (LDH) ENZYME: CPT

## 2020-08-20 PROCEDURE — C1894 INTRO/SHEATH, NON-LASER: HCPCS | Performed by: SURGERY

## 2020-08-20 PROCEDURE — 06H03DZ INSERTION OF INTRALUMINAL DEVICE INTO INFERIOR VENA CAVA, PERCUTANEOUS APPROACH: ICD-10-PCS | Performed by: SURGERY

## 2020-08-20 PROCEDURE — 70030 X-RAY EYE FOR FOREIGN BODY: CPT

## 2020-08-20 PROCEDURE — 82550 ASSAY OF CK (CPK): CPT

## 2020-08-20 DEVICE — FILTER CV L48MM DIA30MM SHTH L65CM DIA7FR UNISET JUG FEM: Type: IMPLANTABLE DEVICE | Site: GROIN | Status: FUNCTIONAL

## 2020-08-20 RX ORDER — IPRATROPIUM BROMIDE AND ALBUTEROL SULFATE 2.5; .5 MG/3ML; MG/3ML
3 SOLUTION RESPIRATORY (INHALATION)
Status: DISCONTINUED | OUTPATIENT
Start: 2020-08-20 | End: 2020-08-22 | Stop reason: CLARIF

## 2020-08-20 RX ORDER — LIDOCAINE HYDROCHLORIDE 10 MG/ML
INJECTION, SOLUTION EPIDURAL; INFILTRATION; INTRACAUDAL; PERINEURAL AS NEEDED
Status: DISCONTINUED | OUTPATIENT
Start: 2020-08-20 | End: 2020-08-20 | Stop reason: HOSPADM

## 2020-08-20 RX ORDER — LIDOCAINE HYDROCHLORIDE 10 MG/ML
INJECTION, SOLUTION EPIDURAL; INFILTRATION; INTRACAUDAL; PERINEURAL
Status: DISPENSED
Start: 2020-08-20 | End: 2020-08-21

## 2020-08-20 RX ORDER — HEPARIN SODIUM 10000 [USP'U]/100ML
18-36 INJECTION, SOLUTION INTRAVENOUS
Status: DISCONTINUED | OUTPATIENT
Start: 2020-08-20 | End: 2020-08-26

## 2020-08-20 RX ORDER — HEPARIN SODIUM 200 [USP'U]/100ML
INJECTION, SOLUTION INTRAVENOUS
Status: COMPLETED | OUTPATIENT
Start: 2020-08-20 | End: 2020-08-20

## 2020-08-20 RX ORDER — DEXTROSE AND POTASSIUM CHLORIDE 5; .15 G/100ML; G/100ML
SOLUTION INTRAVENOUS CONTINUOUS
Status: DISCONTINUED | OUTPATIENT
Start: 2020-08-20 | End: 2020-08-26

## 2020-08-20 RX ORDER — HEPARIN SODIUM 10000 [USP'U]/100ML
18-36 INJECTION, SOLUTION INTRAVENOUS
Status: DISCONTINUED | OUTPATIENT
Start: 2020-08-20 | End: 2020-08-20 | Stop reason: CLARIF

## 2020-08-20 RX ORDER — IODIXANOL 320 MG/ML
INJECTION, SOLUTION INTRAVASCULAR AS NEEDED
Status: DISCONTINUED | OUTPATIENT
Start: 2020-08-20 | End: 2020-08-20 | Stop reason: HOSPADM

## 2020-08-20 RX ORDER — HEPARIN SODIUM 200 [USP'U]/100ML
INJECTION, SOLUTION INTRAVENOUS
Status: DISPENSED
Start: 2020-08-20 | End: 2020-08-21

## 2020-08-20 RX ORDER — POTASSIUM CHLORIDE 7.45 MG/ML
10 INJECTION INTRAVENOUS
Status: COMPLETED | OUTPATIENT
Start: 2020-08-20 | End: 2020-08-20

## 2020-08-20 RX ORDER — IODIXANOL 320 MG/ML
INJECTION, SOLUTION INTRAVASCULAR
Status: DISPENSED
Start: 2020-08-20 | End: 2020-08-21

## 2020-08-20 RX ADMIN — LABETALOL HYDROCHLORIDE 5 MG: 5 INJECTION, SOLUTION INTRAVENOUS at 11:53

## 2020-08-20 RX ADMIN — POTASSIUM CHLORIDE 10 MEQ: 7.46 INJECTION, SOLUTION INTRAVENOUS at 09:42

## 2020-08-20 RX ADMIN — ENOXAPARIN SODIUM 40 MG: 40 INJECTION SUBCUTANEOUS at 02:01

## 2020-08-20 RX ADMIN — AZITHROMYCIN MONOHYDRATE 500 MG: 500 INJECTION, POWDER, LYOPHILIZED, FOR SOLUTION INTRAVENOUS at 02:02

## 2020-08-20 RX ADMIN — DEXTROSE MONOHYDRATE AND POTASSIUM CHLORIDE: 5; .149 INJECTION, SOLUTION INTRAVENOUS at 10:30

## 2020-08-20 RX ADMIN — LABETALOL HYDROCHLORIDE 5 MG: 5 INJECTION, SOLUTION INTRAVENOUS at 12:18

## 2020-08-20 RX ADMIN — HEPARIN SODIUM 18 UNITS/KG/HR: 10000 INJECTION, SOLUTION INTRAVENOUS at 12:10

## 2020-08-20 RX ADMIN — POTASSIUM CHLORIDE 10 MEQ: 7.46 INJECTION, SOLUTION INTRAVENOUS at 13:14

## 2020-08-20 NOTE — PROGRESS NOTES
Per radiology, CT of the head and neck showed at least moderate left-sided PE with infarction. Ordered full CTA chest to evaluate extent, and start lower extremity PVL  Discussed with neurology, who consider anticoagulation high risk at this point. If anticoagulated, he recommends no bolus and modified low-dose IV heparin, or consideration of an IVC filter. We will discuss with neurology again after CTA and loculated PVL. Will sign out to primary attending to follow.

## 2020-08-20 NOTE — CONSULTS
[facility] - Consult Note    Date of  Admission: 8/19/2020  3:34 PM     Primary Care Physician: Nena Tapia MD     Plan:       1. Acute CVA-  Neurology was consulted. MRI and echo have been ordered by medical team   2. Elevate troponin-likely demand ischemia in the setting acute CVA, PE and possible pneumonia- ekg shows shows NSR with LBBB no acute ischemic changes. Continue asa. Consider bb when Neurology clear patient. folow echo to assess lvf, rvf. Or any significant vhd. Further plans based on clinical course  3. Elevated D-dimer- and Acute PE   4. Suspected COV-19. Not detected   5. Right sided pneumonia: Concern for aspiration- being m,anaged by medical team   6. Permissive hypertension- medications per Neurology   7. Dysphagia- in the setting #1  8. LBBB- old   5. Hypokalemia- replete as needed  10. Hypernatremia- on genlty hydration       mildly elevated troponin likely from demand ischemia. Continue supportive care. Will add bp meds in the next 24- 48hrs    XR Results (most recent):  Results from Hospital Encounter encounter on 08/19/20   XR CHEST PORT    Narrative EXAMINATION: Chest single view    INDICATION: Altered mental status, aspiration    COMPARISON: 2/9/2016    FINDINGS: Single view the chest obtained. Moderately enlarged cardiac  silhouette. Slightly prominent perihilar interstitium. Somewhat hazy more  confluent opacity projecting at right midlung level. No definite pneumothorax. No obvious acute osseous findings. Impression IMPRESSION:    Possible pneumonia in the right midlung. Moderately enlarged cardiac silhouette  with suspected mild interstitial infiltrate/edema. Imaging follow-up  recommended.             Assessment:     Hospital Problems  Never Reviewed          Codes Class Noted POA    Cellulitis ICD-10-CM: L03.90  ICD-9-CM: 682.9  8/19/2020 Unknown        Elevated troponin I level ICD-10-CM: R79.89  ICD-9-CM: 790.6  8/19/2020 Unknown        Cerebrovascular accident (CVA) [facility]) ICD-10-CM: I63.9  ICD-9-CM: 434.91  8/19/2020 Unknown        CVA (cerebral vascular accident) [facility]) ICD-10-CM: I63.9  ICD-9-CM: 434.91  8/19/2020 Unknown                   History of Present Illness: This is a 60 y.o. female admitted for Cellulitis [L03.90]Cerebrovascular accident (CVA) ([facility] [address] Hilary.Harjinder. 9]Elevated troponin I level [R79.89]Cerebrovascular accident (CVA) ([facility] [address] [I63.9]CVA (cerebral vascular accident) ([facility] [address] [I63.9]. Patient with PMHx of tobacco abuse. Who presented to  The ED with significant new onset weakness to right side of body. Discussed with sister who is primary support states patient was in her normal state of health when she spoke to her on 08/17/2020. Sister became concerned when she had not heard from her yesterday and ultimately required police about welfare check earlier today. Patient was transferred via EMS to [facility] ED. Patient was found to have acute CVA. She is also COV-19 r/o patient with mildly elevated troponin. Unable to obtain any details about chest pain, chest pressure or SOB. Cardiology consulted for advise and management. Past Medical History:     Past Medical History:   Diagnosis Date    Hypertension          Social History:     Social History     Socioeconomic History    Marital status:      Spouse name: Not on file    Number of children: Not on file    Years of education: Not on file    Highest education level: Not on file   Tobacco Use    Smoking status: Current Every Day Smoker     Packs/day: 1.00    Smokeless tobacco: Never Used   Substance and Sexual Activity    Alcohol use: Yes     Comment: socially    Drug use: Never        Family History:   History reviewed. No pertinent family history. Medications:      Allergies   Allergen Reactions    Benadryl [Diphenhydramine Hcl] Hives        Current Facility-Administered Medications   Medication Dose Route Frequency    heparin 25,000 units in D5W 250 ml infusion 18-36 Units/kg/hr IntraVENous TITRATE    dextrose 5% with KCl 20 mEq/L infusion   IntraVENous CONTINUOUS    potassium chloride 10 mEq in 100 ml IVPB  10 mEq IntraVENous Q1H    aspirin (ASA) suppository 300 mg  300 mg Rectal DAILY    azithromycin (ZITHROMAX) 500 mg in  mL  500 mg IntraVENous Q24H    sodium chloride (NS) flush 5-40 mL  5-40 mL IntraVENous Q8H    sodium chloride (NS) flush 5-40 mL  5-40 mL IntraVENous PRN    docusate sodium (COLACE) capsule 100 mg  100 mg Oral BID PRN    acetaminophen (TYLENOL) suppository 650 mg  650 mg Rectal Q4H PRN    labetaloL (NORMODYNE;TRANDATE) 20 mg/4 mL (5 mg/mL) injection 5 mg  5 mg IntraVENous Q10MIN PRN    zinc sulfate (ZINCATE) 220 (50) mg capsule 1 Cap  1 Cap Oral DAILY    melatonin tablet 3 mg  3 mg Oral QHS    ascorbic acid (vitamin C) (VITAMIN C) tablet 500 mg  500 mg Oral DAILY    atorvastatin (LIPITOR) tablet 80 mg  80 mg Oral QHS     Current Outpatient Medications   Medication Sig    ondansetron hcl (ZOFRAN) 4 mg tablet Take 2 Tabs by mouth every eight (8) hours as needed for Nausea. Review Of Systems:           Unable to obtain due to patient condition   Physical Exam:     Visit Vitals  BP (!) 136/91   Pulse 84   Temp 97.4 °F (36.3 °C)   Resp 23   Ht 5' 3\" (1.6 m)   Wt 62.1 kg (137 lb)   SpO2 100%   BMI 24.27 kg/m²     BP Readings from Last 3 Encounters:   08/20/20 (!) 136/91   03/31/19 147/79     Pulse Readings from Last 3 Encounters:   08/20/20 84   03/31/19 68     Wt Readings from Last 3 Encounters:   08/19/20 62.1 kg (137 lb)   03/31/19 68 kg (150 lb)       General:  alert, mild distress, appears stated age  Skin: Warm and dry, acyanotic, normal color. Head: Normocephalic, atraumatic. Eyes: Sclerae anicteric, conjunctivae without injection.   Neck:  nontender, no nuchal rigidity, no masses, no stridor, no carotid bruit, no JVD  Lungs:  diminished breath sounds   Heart:  regular rate and rhythm, S1, S2 normal, no S3 or S4, no click  Abdomen:  abdomen is soft without significant tenderness, masses, organomegaly or guarding  Extremities:  extremities normal, atraumatic, no cyanosis or edema. Peripheral pulses   Neurological: right side weakness. Psychiatric Affect: The patient is awake, alert unable to comunicate   Data Review:     Recent Results (from the past 48 hour(s))   CBC WITH AUTOMATED DIFF    Collection Time: 08/19/20  3:37 PM   Result Value Ref Range    WBC 13.2 4.6 - 13.2 K/uL    RBC 5.47 (H) 4.20 - 5.30 M/uL    HGB 15.5 12.0 - 16.0 g/dL    HCT 47.2 (H) 35.0 - 45.0 %    MCV 86.3 74.0 - 97.0 FL    MCH 28.3 24.0 - 34.0 PG    MCHC 32.8 31.0 - 37.0 g/dL    RDW 15.5 (H) 11.6 - 14.5 %    PLATELET 462 847 - 216 K/uL    MPV 11.8 9.2 - 11.8 FL    NEUTROPHILS 77 (H) 40 - 73 %    LYMPHOCYTES 12 (L) 21 - 52 %    MONOCYTES 11 (H) 3 - 10 %    EOSINOPHILS 0 0 - 5 %    BASOPHILS 0 0 - 2 %    ABS. NEUTROPHILS 10.2 (H) 1.8 - 8.0 K/UL    ABS. LYMPHOCYTES 1.6 0.9 - 3.6 K/UL    ABS. MONOCYTES 1.5 (H) 0.05 - 1.2 K/UL    ABS. EOSINOPHILS 0.0 0.0 - 0.4 K/UL    ABS. BASOPHILS 0.0 0.0 - 0.1 K/UL    DF AUTOMATED     PROTHROMBIN TIME + INR    Collection Time: 08/19/20  3:37 PM   Result Value Ref Range    Prothrombin time 15.3 (H) 11.5 - 15.2 sec    INR 1.2 0.8 - 1.2     METABOLIC PANEL, COMPREHENSIVE    Collection Time: 08/19/20  3:37 PM   Result Value Ref Range    Sodium 147 (H) 136 - 145 mmol/L    Potassium 3.6 3.5 - 5.5 mmol/L    Chloride 114 (H) 100 - 111 mmol/L    CO2 23 21 - 32 mmol/L    Anion gap 10 3.0 - 18 mmol/L    Glucose 113 (H) 74 - 99 mg/dL    BUN 17 7.0 - 18 MG/DL    Creatinine 0.94 0.6 - 1.3 MG/DL    BUN/Creatinine ratio 18 12 - 20      GFR est AA >60 >60 ml/min/1.73m2    GFR est non-AA >60 >60 ml/min/1.73m2    Calcium 8.9 8.5 - 10.1 MG/DL    Bilirubin, total 1.7 (H) 0.2 - 1.0 MG/DL    ALT (SGPT) 65 (H) 13 - 56 U/L    AST (SGOT) 47 (H) 10 - 38 U/L    Alk.  phosphatase 117 45 - 117 U/L    Protein, total 7.8 6.4 - 8.2 g/dL    Albumin 3.5 3.4 - 5.0 g/dL    Globulin 4.3 (H) 2.0 - 4.0 g/dL    A-G Ratio 0.8 0.8 - 1.7     NT-PRO BNP    Collection Time: 08/19/20  3:37 PM   Result Value Ref Range    NT pro-BNP 13,764 (H) 0 - 900 PG/ML   CARDIAC PANEL,(CK, CKMB & TROPONIN)    Collection Time: 08/19/20  3:37 PM   Result Value Ref Range    CK - MB 7.7 (H) <3.6 ng/ml    CK-MB Index 1.1 0.0 - 4.0 %     (H) 26 - 192 U/L    Troponin-I, QT 0.43 (H) 0.0 - 0.045 NG/ML   EKG, 12 LEAD, INITIAL    Collection Time: 08/19/20  4:36 PM   Result Value Ref Range    Ventricular Rate 85 BPM    Atrial Rate 85 BPM    P-R Interval 142 ms    QRS Duration 140 ms    Q-T Interval 422 ms    QTC Calculation (Bezet) 502 ms    Calculated P Axis 55 degrees    Calculated R Axis 25 degrees    Calculated T Axis 89 degrees    Diagnosis       Sinus rhythm with frequent premature ventricular complexes  Possible Left atrial enlargement  Left bundle branch block  Nonspecific T wave abnormality  Abnormal ECG  When compared with ECG of 09-FEB-2016 11:42,  No significant change was found  Confirmed by Luis A Villalobos MD, ----- [id number]) on 8/20/2020 8:07:04 AM     EKG, 12 LEAD, SUBSEQUENT    Collection Time: 08/19/20  8:36 PM   Result Value Ref Range    Ventricular Rate 96 BPM    Atrial Rate 96 BPM    P-R Interval 152 ms    QRS Duration 142 ms    Q-T Interval 406 ms    QTC Calculation (Bezet) 512 ms    Calculated P Axis 47 degrees    Calculated R Axis 13 degrees    Calculated T Axis 70 degrees    Diagnosis       Sinus rhythm with occasional premature ventricular complexes  Biatrial enlargement  Left bundle branch block  Abnormal ECG  When compared with ECG of 19-AUG-2020 16:36,  No significant change was found  Confirmed by Luis A Villalobos MD, ----- [id number]) on 8/20/2020 8:06:04 AM     CULTURE, BLOOD    Collection Time: 08/19/20  8:40 PM    Specimen: Blood   Result Value Ref Range    Special Requests: NO SPECIAL REQUESTS      Culture result: NO GROWTH AFTER 5 HOURS     CULTURE, BLOOD    Collection Time: 08/19/20  9:00 PM    Specimen: Blood   Result Value Ref Range    Special Requests: NO SPECIAL REQUESTS      Culture result: NO GROWTH AFTER 5 HOURS     D DIMER    Collection Time: 08/19/20  9:12 PM   Result Value Ref Range    D DIMER 14.83 (H) <0.46 ug/ml(FEU)   FERRITIN    Collection Time: 08/19/20  9:12 PM   Result Value Ref Range    Ferritin 79 8 - 388 NG/ML   CK    Collection Time: 08/19/20  9:12 PM   Result Value Ref Range     (H) 26 - 192 U/L   C REACTIVE PROTEIN, QT    Collection Time: 08/19/20  9:12 PM   Result Value Ref Range    C-Reactive protein 10.1 (H) 0 - 0.3 mg/dL   PROCALCITONIN    Collection Time: 08/19/20  9:12 PM   Result Value Ref Range    Procalcitonin <0.05 ng/mL   LD    Collection Time: 08/19/20  9:12 PM   Result Value Ref Range     (H) 81 - 234 U/L   ETHYL ALCOHOL    Collection Time: 08/19/20  9:12 PM   Result Value Ref Range    ALCOHOL(ETHYL),SERUM <3 0 - 3 MG/DL   TROPONIN I    Collection Time: 08/19/20  9:12 PM   Result Value Ref Range    Troponin-I, QT 0.56 (H) 0.0 - 0.045 NG/ML   CK-MB,QT    Collection Time: 08/19/20  9:12 PM   Result Value Ref Range    CK - MB 5.5 (H) <3.6 ng/ml    CK-MB Index 0.7 0.0 - 4.0 %   URINALYSIS W/ RFLX MICROSCOPIC    Collection Time: 08/20/20  1:59 AM   Result Value Ref Range    Color YELLOW      Appearance CLEAR      Specific gravity 1.019 1.005 - 1.030      pH (UA) 5.0 5.0 - 8.0      Protein 30 (A) NEG mg/dL    Glucose Negative NEG mg/dL    Ketone Negative NEG mg/dL    Bilirubin Negative NEG      Blood SMALL (A) NEG      Urobilinogen 0.2 0.2 - 1.0 EU/dL    Nitrites Negative NEG      Leukocyte Esterase Negative NEG     DRUG SCREEN, URINE    Collection Time: 08/20/20  1:59 AM   Result Value Ref Range    BENZODIAZEPINES Negative NEG      BARBITURATES Negative NEG      THC (TH-CANNABINOL) Negative NEG      OPIATES Negative NEG      PCP(PHENCYCLIDINE) Negative NEG      COCAINE Negative NEG      AMPHETAMINES Negative NEG      METHADONE Negative NEG      HDSCOM (NOTE)    SARS-COV-2    Collection Time: 08/20/20  1:59 AM   Result Value Ref Range    SARS-CoV-2 PENDING     Specimen source Nasopharyngeal      Specimen type NP Swab     URINE MICROSCOPIC ONLY    Collection Time: 08/20/20  1:59 AM   Result Value Ref Range    WBC Negative 0 - 4 /hpf    RBC 0 to 3 0 - 5 /hpf    Epithelial cells FEW 0 - 5 /lpf    Bacteria Negative NEG /hpf   D DIMER    Collection Time: 08/20/20  8:12 AM   Result Value Ref Range    D DIMER 16.61 (H) <0.46 ug/ml(FEU)   FERRITIN    Collection Time: 08/20/20  8:12 AM   Result Value Ref Range    Ferritin 111 8 - 388 NG/ML   C REACTIVE PROTEIN, QT    Collection Time: 08/20/20  8:12 AM   Result Value Ref Range    C-Reactive protein 15.9 (H) 0 - 0.3 mg/dL   PROCALCITONIN    Collection Time: 08/20/20  8:12 AM   Result Value Ref Range    Procalcitonin 0.24 ng/mL   LD    Collection Time: 08/20/20  8:12 AM   Result Value Ref Range     (H) 81 - 234 U/L   LIPID PANEL    Collection Time: 08/20/20  8:12 AM   Result Value Ref Range    LIPID PROFILE          Cholesterol, total 153 <200 MG/DL    Triglyceride 99 <150 MG/DL    HDL Cholesterol 45 40 - 60 MG/DL    LDL, calculated 88.2 0 - 100 MG/DL    VLDL, calculated 19.8 MG/DL    CHOL/HDL Ratio 3.4 0 - 5.0     HEMOGLOBIN A1C WITH EAG    Collection Time: 08/20/20  8:12 AM   Result Value Ref Range    Hemoglobin A1c 5.7 (H) 4.2 - 5.6 %    Est. average glucose 216 mg/dL   METABOLIC PANEL, BASIC    Collection Time: 08/20/20  8:12 AM   Result Value Ref Range    Sodium 150 (H) 136 - 145 mmol/L    Potassium 3.0 (L) 3.5 - 5.5 mmol/L    Chloride 113 (H) 100 - 111 mmol/L    CO2 27 21 - 32 mmol/L    Anion gap 10 3.0 - 18 mmol/L    Glucose 99 74 - 99 mg/dL    BUN 16 7.0 - 18 MG/DL    Creatinine 0.75 0.6 - 1.3 MG/DL    BUN/Creatinine ratio 21 (H) 12 - 20      GFR est AA >60 >60 ml/min/1.73m2    GFR est non-AA >60 >60 ml/min/1.73m2    Calcium 8.5 8.5 - 10.1 MG/DL   CBC WITH AUTOMATED DIFF    Collection Time: 08/20/20  8:12 AM   Result Value Ref Range    WBC 18.1 (H) 4.6 - 13.2 K/uL    RBC 5.61 (H) 4.20 - 5.30 M/uL    HGB 15.9 12.0 - 16.0 g/dL    HCT 48.7 (H) 35.0 - 45.0 %    MCV 86.8 74.0 - 97.0 FL    MCH 28.3 24.0 - 34.0 PG    MCHC 32.6 31.0 - 37.0 g/dL    RDW 15.7 (H) 11.6 - 14.5 %    PLATELET 145 290 - 203 K/uL    MPV 11.8 9.2 - 11.8 FL    NEUTROPHILS 88 (H) 42 - 75 %    LYMPHOCYTES 7 (L) 20 - 51 %    MONOCYTES 5 2 - 9 %    EOSINOPHILS 0 0 - 5 %    BASOPHILS 0 0 - 3 %    ABS. NEUTROPHILS 15.9 (H) 1.8 - 8.0 K/UL    ABS. LYMPHOCYTES 1.3 0.8 - 3.5 K/UL    ABS. MONOCYTES 0.9 0 - 1.0 K/UL    ABS. EOSINOPHILS 0.0 0.0 - 0.4 K/UL    ABS.  BASOPHILS 0.0 0.0 - 0.06 K/UL    DF MANUAL      PLATELET COMMENTS ADEQUATE PLATELETS      RBC COMMENTS NORMOCYTIC, NORMOCHROMIC      RBC COMMENTS POLYCHROMASIA  1+       PHOSPHORUS    Collection Time: 08/20/20  8:12 AM   Result Value Ref Range    Phosphorus 3.8 2.5 - 4.9 MG/DL   MAGNESIUM    Collection Time: 08/20/20  8:12 AM   Result Value Ref Range    Magnesium 2.1 1.6 - 2.6 mg/dL   CARDIAC PANEL,(CK, CKMB & TROPONIN)    Collection Time: 08/20/20  8:12 AM   Result Value Ref Range    CK - MB 3.4 <3.6 ng/ml    CK-MB Index 0.5 0.0 - 4.0 %     (H) 26 - 192 U/L    Troponin-I, QT 0.40 (H) 0.0 - 0.045 NG/ML         Intake/Output Summary (Last 24 hours) at 8/20/2020 1026  Last data filed at 8/20/2020 0916  Gross per 24 hour   Intake 100 ml   Output 1200 ml   Net -1100 ml       Cardiographics:       EKG Results     Procedure 720 Value Units Date/Time    EKG, 12 LEAD, INITIAL [[id number]] Collected:  08/19/20 1636    Order Status:  Completed Updated:  08/20/20 0807     Ventricular Rate 85 BPM      Atrial Rate 85 BPM      P-R Interval 142 ms      QRS Duration 140 ms      Q-T Interval 422 ms      QTC Calculation (Bezet) 502 ms      Calculated P Axis 55 degrees      Calculated R Axis 25 degrees      Calculated T Axis 89 degrees      Diagnosis --     Sinus rhythm with frequent premature ventricular complexes  Possible Left atrial enlargement  Left bundle branch block  Nonspecific T wave abnormality  Abnormal ECG  When compared with ECG of 09-FEB-2016 11:42,  No significant change was found  Confirmed by Rukhsana Genao MD, ----- [id number]) on 8/20/2020 8:07:04 AM      EKG, 12 LEAD, SUBSEQUENT [[id number]] Collected:  08/19/20 2036    Order Status:  Completed Updated:  08/20/20 0806     Ventricular Rate 96 BPM      Atrial Rate 96 BPM      P-R Interval 152 ms      QRS Duration 142 ms      Q-T Interval 406 ms      QTC Calculation (Bezet) 512 ms      Calculated P Axis 47 degrees      Calculated R Axis 13 degrees      Calculated T Axis 70 degrees      Diagnosis --     Sinus rhythm with occasional premature ventricular complexes  Biatrial enlargement  Left bundle branch block  Abnormal ECG  When compared with ECG of 19-AUG-2020 16:36,  No significant change was found  Confirmed by Rukhsana Genao MD, ----- ([id number]) on 8/20/2020 8:06:04 AM                 Signed By: Leilani BAILEY Phone [phone] supervised    August 20, 2020      I have independently evaluated and examined the patient. All relevant labs and testing data's are reviewed. Care plan discussed and updated after review.     Rylie Valladares MD

## 2020-08-20 NOTE — PROGRESS NOTES
TRANSFER - IN REPORT:    Verbal report received from Caryn Ho (name) on [facility]  being received from Emergency department (unit) for ordered procedure      Report consisted of patients Situation, Background, Assessment and   Recommendations(SBAR). Information from the following report(s) SBAR and ED Summary was reviewed with the receiving nurse. Opportunity for questions and clarification was provided. Assessment completed upon patients arrival to unit and care assumed.

## 2020-08-20 NOTE — CONSULTS
[facility]    Chief Complaint   Patient presents with    Altered mental status       History and Physical    59-year-old female admitted with DVT, pulmonary embolism, and stroke. Seen by emergency room, hospitalist, and neurology. Vascular consulted because patient is unable to anticoagulate for fear of conversion to hemorrhagic stroke. I saw her at the bedside, she was able to nod and express appropriately. Her right side has hemiparesis    Past Medical History:   Diagnosis Date    Hypertension      Patient Active Problem List   Diagnosis Code    Cellulitis L03.90    Elevated troponin I level R79.89    Cerebrovascular accident (CVA) ([facility] [address] I63.9    CVA (cerebral vascular accident) ([facility] [address] I63.9     History reviewed. No pertinent surgical history.   Current Facility-Administered Medications   Medication Dose Route Frequency Provider Last Rate Last Dose    dextrose 5% with KCl 20 mEq/L infusion   IntraVENous CONTINUOUS Katie Vieira MD 75 mL/hr at 08/20/20 1030      heparin (porcine) 25,000 units in 0.45% saline 250 ml infusion  18-36 Units/kg/hr IntraVENous TITRATE Katie Vieira MD 11.2 mL/hr at 08/20/20 1210 18 Units/kg/hr at 08/20/20 1210    piperacillin-tazobactam (ZOSYN) 3.375 g in 0.9% sodium chloride (MBP/ADV) 100 mL MBP  3.375 g IntraVENous Q6H Venkatesh Solorio MD        albuterol-ipratropium (DUO-NEB) 2.5 MG-0.5 MG/3 ML  3 mL Nebulization Q4H RT Katie Vieira MD        aspirin (ASA) suppository 300 mg  300 mg Rectal DAILY Isma Garza DO   Stopped at 08/20/20 0900    azithromycin (ZITHROMAX) 500 mg in  mL  500 mg IntraVENous Q24H Misael SHEPARD DO   500 mg at 08/20/20 0202    sodium chloride (NS) flush 5-40 mL  5-40 mL IntraVENous Q8H Evelyn Dangelo MD   10 mL at 08/19/20 2200    sodium chloride (NS) flush 5-40 mL  5-40 mL IntraVENous PRN Steven Khanna MD        docusate sodium (COLACE) capsule 100 mg  100 mg Oral BID PRN sEperanza Orantes MD        acetaminophen (TYLENOL) suppository 650 mg  650 mg Rectal Q4H PRN Ricardo Khanna MD        labetaloL (NORMODYNE;TRANDATE) 20 mg/4 mL (5 mg/mL) injection 5 mg  5 mg IntraVENous Q10MIN PRN Esperanza Orantes MD   5 mg at 08/20/20 1218    zinc sulfate (ZINCATE) 220 (50) mg capsule 1 Cap  1 Cap Oral DAILY Ricardo Khanna MD   Stopped at 08/20/20 0900    melatonin tablet 3 mg  3 mg Oral QHS Esperanza Orantes MD   Stopped at 08/19/20 2200    ascorbic acid (vitamin C) (VITAMIN C) tablet 500 mg  500 mg Oral DAILY Esperanza Orantes MD   Stopped at 08/20/20 0900    atorvastatin (LIPITOR) tablet 80 mg  80 mg Oral QHS Bob Handley DO   Stopped at 08/19/20 2200     Current Outpatient Medications   Medication Sig Dispense Refill    ondansetron hcl (ZOFRAN) 4 mg tablet Take 2 Tabs by mouth every eight (8) hours as needed for Nausea.  12 Tab 0     Allergies   Allergen Reactions    Benadryl [Diphenhydramine Hcl] Hives     Social History     Socioeconomic History    Marital status:      Spouse name: Not on file    Number of children: Not on file    Years of education: Not on file    Highest education level: Not on file   Occupational History    Not on file   Social Needs    Financial resource strain: Not on file    Food insecurity     Worry: Not on file     Inability: Not on file    Transportation needs     Medical: Not on file     Non-medical: Not on file   Tobacco Use    Smoking status: Current Every Day Smoker     Packs/day: 1.00    Smokeless tobacco: Never Used   Substance and Sexual Activity    Alcohol use: Yes     Comment: socially    Drug use: Never    Sexual activity: Not on file   Lifestyle    Physical activity     Days per week: Not on file     Minutes per session: Not on file    Stress: Not on file   Relationships    Social connections     Talks on phone: Not on file     Gets together: Not on file     Attends [employer] service: Not on file     Active member of club or organization: Not on file     Attends meetings of clubs or organizations: Not on file     Relationship status: Not on file    Intimate partner violence     Fear of current or ex partner: Not on file     Emotionally abused: Not on file     Physically abused: Not on file     Forced sexual activity: Not on file   Other Topics Concern    Not on file   Social History Narrative    Not on file      History reviewed. No pertinent family history. Review of Systems    A full review of systems was completed times ten organ systems and was deemed negative unless otherwise mentioned in the HPI. Physical Exam:    Visit Vitals  /90   Pulse (!) 107   Temp 97.4 °F (36.3 °C)   Resp 25   Ht 5' 3\" (1.6 m)   Wt 137 lb (62.1 kg)   SpO2 100%   BMI 24.27 kg/m²      Seems stable and calm at the bedside  Head is atraumatic  Neck no JVD  Chest clear  Cardiac regular  Abdomen soft flat nontender  Swelling of lower extremities no signs of acute arterial insufficiency  Right side stroke symptoms    Impression and Plan:  DVT, pulmonary embolism, CVA  Will place emergent IVC filter  Consider possibility of patent foramen ovale          Issac Cooley MD    PLEASE NOTE:  This document has been produced using voice recognition software. Unrecognized errors in transcription may be present.

## 2020-08-20 NOTE — PROGRESS NOTES
[facility]/IPR REFERRAL CONTACT NOTE  [id number] [facility]    Re: [facility]    IP Consult for IP Rehab Screen received. Will review case and advise as appropriate. Thank you for the consult.     Izabel Glover

## 2020-08-20 NOTE — PROGRESS NOTES
Per radiology protocol pt can only be injected once within 24 hours with IV contrast.  Can not be injected until 6 pm this evening.

## 2020-08-20 NOTE — OP NOTES
Preoperative diagnosis: DVT, pulmonary embolism, CVA, aspiration, inability to anticoagulate    Postoperative diagnosis: Same    Procedures performed:  #1  Ultrasound of right femoral vein with interpretation  #2  Inferior venacavogram with interpretation  #3  Placement of inferior vena cava filter, Cook celect platinum  #4      Cultures: None    Specimens: None    Drains: None    Estimated blood loss: Less than 50 mL    Assistants: None    Implants: Please see above    Complications: None    Anesthesia:  monitored local.    Indications for the procedure:  Kevin Buck is a 60 y.o. unfortunate 44-year-old female who came to the ER with a stroke, pulmonary embolism, DVT and aspiration pneumonia. She was seen by neurology and felt unstable to anticoagulate. Spoke with the sister on the phone we will plan to place a filter to protect from further PE. Patient was given the appropriate risk and benefits of the procedure including but not limited to bleeding, infection, damage to adjacent structures, MI, stroke, death, loss of lower extremity, need for further surgery. Patient was understanding of all the risks and underwent a procedure. Operative findings:   #1 right common femoral vein, right external and common and internal iliac vein, and inferior vena cava patent without thrombus. Renal vein visualized with no thrombus. IVC filter deployed without complication between the renal vein and the distal bifurcation. Procedure:  Patient was correctly identified in the preop area and taken to the operating room in stable condition. Patient had pre-incision timeout prior to any incision. Patient was prepped and draped in the normal sterile fashion according to CDC guidelines aseptic technique. Ultrasound was done of the right femoral vein the vein was compressible at the common femoral location start a picture for PACS.   Again the single puncture to the vein with a 19 needle placed a wire into the inferior vena cava seen with fluoroscopy. Placed a 6 Nepali sheath and performed inferior venacavogram.  I removed this and placed the filter sheath. Deployed the Triana filter in the inferior vena cava above the bifurcation below below the renal vein. Appointment was in good position with full deployment and no migration. I removed the sheath and held direct pressure hemostasis and applied a dry dressing.   She was transferred to recovery in stable condition

## 2020-08-20 NOTE — PROGRESS NOTES
MRI Screening form needs to be filled out and faxed to [address] MRI can be scheduled. If unable to obtain information from pt, [facility] needs to be contacted.  If pt is claustro or will need pain meds, please have ordered in advance in order to facilitate exam.

## 2020-08-20 NOTE — PROGRESS NOTES
SLP Note:    SLP evaluation completed; full report to follow. Recommend continue NPO; meds via alternate route. May need to consider alternative nutrition/hydration source.      Thank you for this referral,   Leonarda Stallworth M.S., [id number] [facility]  Speech-Language Pathologist

## 2020-08-20 NOTE — PROGRESS NOTES
Physician Progress Note      Koko Rob  CSN #:                  [id number]  :                       1955  ADMIT DATE:       2020 3:34 PM  DISCH DATE:  RESPONDING  PROVIDER #:        Frankey Simpson MITCHELL-HOLSTON NP          QUERY TEXT:    Pt admitted with acute CVA. Pt noted to be completely flaccid on the right upper and lower extremity. If possible, please document in progress notes and discharge summary the relationship, if any, between acute CVA and weakness. The medical record reflects the following:  Risk Factors: Acute CVA  Clinical Indicators: H&P notes Follows some simple commands, completely flaccid to right upper and right lower extremity; some spontaneous movement to left side to resistance; weakness  Treatment: OT; PT    Thank you,  Mary Patel RN/CCDS  [phone]  Options provided:  -- Right sided weakness due to CVA  -- Right hemiparesis due to CVA  -- Other - I will add my own diagnosis  -- Disagree - Not applicable / Not valid  -- Disagree - Clinically unable to determine / Unknown  -- Refer to Clinical Documentation Reviewer    PROVIDER RESPONSE TEXT:    This patient has right sided weakness due to CVA.     Query created by: Sanchez Johnson on 2020 12:14 PM      Electronically signed by:  Dasia Cooley NP 2020 3:00 PM

## 2020-08-20 NOTE — CONSULTS
A 60 female patient with past medical history of hypertension was brought to the emergency room after she was found down on the floor. The last time she was seen was 4 days prior. She was not speaking and not moving the right side. She also has impaired comprehension. This morning, patient is awake and tracks. Will follow simple commands. No word output. No movement of the right side. Had a CT scan of the head which showed an acute/subacute left MCA large infarction. No hemorrhagic conversion. There is mild mass-effect on the ventricle. CTA of the head and neck was unremarkable. But the CTA incidentally showed  left-sided moderate sized acute pulmonary embolism. CTA of the chest was ordered. MRI is pending.       Social History     Socioeconomic History    Marital status:      Spouse name: Not on file    Number of children: Not on file    Years of education: Not on file    Highest education level: Not on file   Occupational History    Not on file   Social Needs    Financial resource strain: Not on file    Food insecurity     Worry: Not on file     Inability: Not on file    Transportation needs     Medical: Not on file     Non-medical: Not on file   Tobacco Use    Smoking status: Current Every Day Smoker     Packs/day: 1.00    Smokeless tobacco: Never Used   Substance and Sexual Activity    Alcohol use: Yes     Comment: socially    Drug use: Never    Sexual activity: Not on file   Lifestyle    Physical activity     Days per week: Not on file     Minutes per session: Not on file    Stress: Not on file   Relationships    Social connections     Talks on phone: Not on file     Gets together: Not on file     Attends [employer] service: Not on file     Active member of club or organization: Not on file     Attends meetings of clubs or organizations: Not on file     Relationship status: Not on file    Intimate partner violence     Fear of current or ex partner: Not on file     Emotionally abused: Not on file     Physically abused: Not on file     Forced sexual activity: Not on file   Other Topics Concern    Not on file   Social History Narrative    Not on file       History reviewed. No pertinent family history. Current Facility-Administered Medications   Medication Dose Route Frequency Provider Last Rate Last Dose    aspirin (ASA) suppository 300 mg  300 mg Rectal DAILY Petermalachi Choe K, DO   300 mg at 08/19/20 1935    azithromycin (ZITHROMAX) 500 mg in  mL  500 mg IntraVENous Q24H Misael Choe K, DO   500 mg at 08/20/20 0202    sodium chloride (NS) flush 5-40 mL  5-40 mL IntraVENous Q8H Evelyn Dangelo MD   10 mL at 08/19/20 2200    sodium chloride (NS) flush 5-40 mL  5-40 mL IntraVENous PRN Steven Khanna MD        docusate sodium (COLACE) capsule 100 mg  100 mg Oral BID PRN Steven Khanna MD        acetaminophen (TYLENOL) suppository 650 mg  650 mg Rectal Q4H PRN Steven Khanna MD        labetaloL (NORMODYNE;TRANDATE) 20 mg/4 mL (5 mg/mL) injection 5 mg  5 mg IntraVENous Q10MIN PRN Steven Khanna MD        zinc sulfate (ZINCATE) 220 (50) mg capsule 1 Cap  1 Cap Oral DAILY Steven Khanna MD        melatonin tablet 3 mg  3 mg Oral QHS Evelyn Dangelo MD   Stopped at 08/19/20 2200    ascorbic acid (vitamin C) (VITAMIN C) tablet 500 mg  500 mg Oral DAILY Steven Khanna MD        atorvastatin (LIPITOR) tablet 80 mg  80 mg Oral QHS Isma Garza DO   Stopped at 08/19/20 2200     Current Outpatient Medications   Medication Sig Dispense Refill    ondansetron hcl (ZOFRAN) 4 mg tablet Take 2 Tabs by mouth every eight (8) hours as needed for Nausea. 12 Tab 0       Past Medical History:   Diagnosis Date    Hypertension        History reviewed. No pertinent surgical history.     Allergies   Allergen Reactions    Benadryl [Diphenhydramine Hcl] Hives       Patient Active Problem List   Diagnosis Code    Cellulitis L03.90    Elevated troponin I level R79.89    Cerebrovascular accident (CVA) ([facility] [address] I63.9    CVA (cerebral vascular accident) ([facility] [address] I63.9         Review of Systems:   Unable to obtain due to her clinical condition. PHYSICAL EXAMINATION:      VITAL SIGNS:    Visit Vitals  BP (!) 138/96   Pulse 84   Temp 97.4 °F (36.3 °C)   Resp 25   Ht 5' 3\" (1.6 m)   Wt 62.1 kg (137 lb)   SpO2 99%   BMI 24.27 kg/m²       GENERAL: In no apparent distress. EXTREMITIES: Slightly increased right lower extremity tone. No movement on the right upper or lower extremity. HEAD:   Ear, nose, and throat appear to be without trauma. The patient is normocephalic. NEUROLOGIC EXAMINATION  Mental status: Awake, alert, not answering any orientation questions; also follows simple midline commands; no gaze deviation. Speech and languge: Mute; impaired comprehension. CN: BTT only from the left side;  EOMI, PERRLA, right lower facial palsy, palate elevation symmetric bilat, tongue midline  Motor: Slightly decreased tone over the right upper extremity and slightly increased tone over the right lower extremity; strength is on the right side 0/5; left side 5 out of 5. Sensory: Responds to pain from all sites. Coordination: Unable to assess. DTR: 2+ throughout, upgoing plantar on the right  Gait: not tested     Result Information     Status: Final result (Exam End: 8/19/2020 16:02)  Provider Status: Open    Study Result     EXAM: CT HEAD WO CONT     CLINICAL INDICATION/HISTORY: question CVA, no movement right side, down x 4 days     TECHNIQUE: Contiguous axial images were obtained through the brain.   From these,  sagittal and coronal reconstructions were generated.     CT scans at this facility are performed using dose optimization technique as  appropriate with performed exam, to include automated exposure control,  adjustment of mA and/or kV according to patient's size (including appropriate  matching for site-specific examinations), or use of iterative reconstruction  technique.     COMPARISON: None     FINDINGS:           Soft tissues: No significant findings.     Skull base/calvarium: Unremarkable.     Brain: There is a nonhemorrhagic acute to subacute infarction on the left. In  the axial plane involving an area of about 5 x 5 cm. There is mass effect on the  left lateral ventricle there is mild 3 to 4 mm midline shift.     No evidence of hemorrhagic conversion is seen.     There is a tiny mary of calcific density in the medial and the tiny increased  density in the posterior margins of the infarcted parenchyma suggesting  possibility of some distal calcific embolus see image 12 series 2. Some of the  density may represent stagnant or thrombosed blood within MCA territory branch  arteries.     This appears to be involving the cortex as well as the left basal ganglia  thalamic region and globus pallidus. Putamen is involved along its posterior  aspect. There is evidence for some indeterminate probably old infarction in the right  anterior basal ganglia and thalamus seen on image 13 series 2  Ventricles and cisterns: No intraluminal bleed is seen     Orbits: Unremarkable.      Paranasal Sinuses: Clear      Other findings: None     IMPRESSION  IMPRESSION[de-identified]     Acute/subacute large nonhemorrhagic left MCA territory infarction as described     Small calcific density in the medial portion may represent some calcified  embolic material. Densities along the posterior aspect of the infarcted area may  represent some thrombosed vessels. Result Information     Status: Final result (Exam End: 8/19/2020 17:02)  Provider Status: Open    Study Result     EXAM: CTA HEAD NECK W WO CONT     CLINICAL INDICATIONS: Eval for LVO     TECHNIQUE: Helical CT scan of the brain and neck were performed at 1.25 mm  intervals during rapid IV bolus contrast administration.   These data were  reconstructed at .625 mm intervals for vascular analysis. The data was also  reviewed at 2.5 mm intervals for accompanying soft tissue analysis. 3D post  processed images, including surface shaded displays, were produced for this exam  on independent console, permanently archived and interpreted.     All CT scans at this facility are performed using dose optimization technique as  appropriate to a performed exam, to include automated exposure control,  adjustment of the MA and/or kV according to patient size (including appropriate  matching for site-specific examinations) or use of  iterative reconstruction  technique.     CONTRAST: Isovue 370     COMPARISON: None     CTA SOFT TISSUE ANALYSIS:  Lungs: Clear infiltrate on the right side moderate burden q.d. pulmonary embolus  identified in the vessels on the left side. Upper chest: Unremarkable  Neck: Unremarkable  Lymph nodes: No adenopathy  Orbits: Unremarkable  Paranasal sinuses: Clear  Brain: Evidence for a moderate to large left MCA territory infarction. The  distal branches appear to have recannulized. There is no evidence of any  proximal vessel cut off to the infarcted area on the left. Bones: Unremarkable for age.     CTA NECK VASCULAR ANALYSIS:      Aortic arch: Left sided with typical configuration.     Innominate: Patent     Right Subclavian: Patent  Left Subclavian: Patent     Right carotid:  -CCA: Patent  -ECA: Patent  -ICA: Patent.  Estimated % stenosis of proximal ICA by NASCET criteria.     Left carotid:  -CCA: Patent  -ECA: Patent  -ICA: Patent Estimated % stenosis of proximal ICA by NASCET criteria.     Right vertebral: Patent     Left vertebral: Patent        CTA BRAIN VASCULAR ANALYSIS:      Right anterior circulation:  -ICA: Patent  -JEFFERY: Patent   -MCA: Patent     Left anterior circulation:  -ICA: Patent  -JEFFERY: Patent   -MCA: Patent     -ACOM: Unremarkable  -PCOM: Patent     Posterior circulation:  -RVA: Patent  -LVA: Patent  -Basilar: Patent  -Right PCA: Patent  -Left PCA: Patent     Major dural venous sinuses: Unremarkable     Other:     There is evidence for moderate burden PE on the left side with extension  secondary and tertiary branches into the upper lobe. The entire lung is not  included here. The main pulmonary artery and right pulmonary artery are. These  are free of any additional defects.        IMPRESSION  Impression:     1. Patent intra- and extracranial brain arteries. Vessels surrounding the  infarcted area in the left frontoparietal region have recannulized. No evidence  of any proximal vessel cut off to the infarcted region is identified in the  proximal M1 and M2 segments     Evidence for at least left-sided moderate sized acute pulmonary embolism.     Patient's report was called at 7:15 AM to the emergency room patient still  waiting for available in-house bed. CBC:   Lab Results   Component Value Date/Time    WBC 18.1 (H) 08/20/2020 08:12 AM    RBC 5.61 (H) 08/20/2020 08:12 AM    HGB 15.9 08/20/2020 08:12 AM    HCT 48.7 (H) 08/20/2020 08:12 AM    PLATELET 719 51/37/3499 08:12 AM     BMP:   Lab Results   Component Value Date/Time    Glucose 113 (H) 08/19/2020 03:37 PM    Sodium 147 (H) 08/19/2020 03:37 PM    Potassium 3.6 08/19/2020 03:37 PM    Chloride 114 (H) 08/19/2020 03:37 PM    CO2 23 08/19/2020 03:37 PM    BUN 17 08/19/2020 03:37 PM    Creatinine 0.94 08/19/2020 03:37 PM    Calcium 8.9 08/19/2020 03:37 PM     CMP:   Lab Results   Component Value Date/Time    Glucose 113 (H) 08/19/2020 03:37 PM    Sodium 147 (H) 08/19/2020 03:37 PM    Potassium 3.6 08/19/2020 03:37 PM    Chloride 114 (H) 08/19/2020 03:37 PM    CO2 23 08/19/2020 03:37 PM    BUN 17 08/19/2020 03:37 PM    Creatinine 0.94 08/19/2020 03:37 PM    Calcium 8.9 08/19/2020 03:37 PM    Anion gap 10 08/19/2020 03:37 PM    BUN/Creatinine ratio 18 08/19/2020 03:37 PM    Alk.  phosphatase 117 08/19/2020 03:37 PM    Protein, total 7.8 08/19/2020 03:37 PM    Albumin 3.5 08/19/2020 03:37 PM    Globulin 4.3 (H) 08/19/2020 03:37 PM    A-G Ratio 0.8 08/19/2020 03:37 PM     Coagulation:   Lab Results   Component Value Date/Time    Prothrombin time 15.3 (H) 08/19/2020 03:37 PM    INR 1.2 08/19/2020 03:37 PM       Impression:   A 60years old female patient was brought to the emergency room after she was found down at her home of unknown duration [she was last seen about 4 days ago]. She has global aphasia with right-sided body weakness. Otherwise she is awake. No witnessed seizure. CT scan of the brain showed a large left MCA territory stroke with no hemorrhage. No previous history of stroke on the chart. No atrial fibrillation on telemetry. CT angiography of the head and neck was unremarkable. Incidental finding of left side pulmonary embolism. Patient is going to get CTA of the chest.    Plan:  -Continuous telemetry/up with assistance  -Vitals/Neurochecks q4hrs  -MRI Brain w/o contrast  -TTE  -Follow the CTA of chest  -Management of pulmonary embolism with anticoagulation in this patient with large MCA territory acute infarction is difficult. If patient is clinically stable and the primary embolism is not large or with no significant hemodynamic changes, patient may need IVC filter if diagnosed with DVT. Otherwise has to be an low heparin protocoler: Stroke protocol with no bolus or re-bolusing.  -Permissive HTN, PRN Hydralazine 25mg PO for SBP > 200  -Start on ASA 81mg Qdaily and Atorvastatin 80mg Qdaily  -HgbA1C and Fasting Lipid Panel  -PT/OT/ST eval: follow recommendations. PLEASE NOTE:   This document has been produced using voice recognition software. Unrecognized errors in transcription may be present.

## 2020-08-20 NOTE — PROGRESS NOTES
Spoken with patient's sister, Kayleigh Bermeo, in concern to both procedure and anesthesia consent. Procedure explained in detail, time for questions given; no questions at this time. Kayleigh Bermeo consented for vena cava filter and related anesthesia. Verbal telephone consent verified with second nurse Jef Salazar RN.     Kayleigh Bermeo (cellphone): [phone]

## 2020-08-20 NOTE — CONSULTS
Infectious Disease Consultation Note        Reason: Acute pulmonary embolism, suspected COVID-19 infection    Current abx Prior abx   Azithromycin since 8/19 Piperacillin/tazobactam 8/19     Lines:       Assessment :     60 y.o.  female  with past medical history significant for hypertension who presented to ED on 8/19/2020 with significant new onset weakness to right side of body. CT head: Acute/subacute large nonhemorrhagic left MCA territory infarction. CTA head and neck 8/19-moderate-sized pulmonary embolism. CXR 8/19- RML infiltrate    Now with worsening leukocytosis. Clinical presentation consistent with aspiration pneumonia in a patient with acute pulmonary embolism, CVA. Worsening leukocytosis likely secondary to aspiration pneumonia along with leukemoid reaction to acute pulmonary embolism. Looking at the ongoing pandemic of COVID-19, I recommend to obtain COVID-19 test to rule out hypercoagulable state from recent COVID-19    Recommendations:    1. Start piperacillin/tazobactam.  Discontinue azithromycin  2. Obtain rapid COVID test  3. Needs frequent suctioning-discussed with nursing  4. Management of pulmonary embolism per primary team  5. Management of CVA per neurology  6. Follow-up blood cultures  7. Further recommendations based on the above test results and clinical course        Thank you for consultation request. Above plan was discussed in details with patient, RN and dr Sandra Valdes. Please call me if any further questions or concerns. Will continue to participate in the care of this patient. HPI:     60 y.o.  female  with past medical history significant for hypertension who presented to ED on 8/19/2020 with significant new onset weakness to right side of body. Patient is unable to communicate effectively.   I obtained history from review of records, talking to the hospitalist.   Per records ,patient was in her normal state of health when she spoke to her on 08/17/2020. Sister became concerned when she had not heard from her yesterday and ultimately required police about welfare check earlier today. Patient was transferred via EMS to [facility] ED. CT head: Acute/subacute large nonhemorrhagic left MCA territory infarction. CTA head and neck 8/19-moderate-sized pulmonary embolism. Patient was started on heparin drip. Patient was also started on antibiotics for possible pneumonia. There is concern for COVID-19 infection. I have been consulted for further recommendations. Patient has significant difficulty communicating and I am unable to understand what she is saying. She had significant wet sounds from upper airway when I was in the room. She was not tachypneic and was saturating 100% on 3 L     Past Medical History:   Diagnosis Date    Hypertension        History reviewed. No pertinent surgical history. Patient's Medications   Start Taking    No medications on file   Continue Taking    ONDANSETRON HCL (ZOFRAN) 4 MG TABLET    Take 2 Tabs by mouth every eight (8) hours as needed for Nausea.    These Medications have changed    No medications on file   Stop Taking    No medications on file       Current Facility-Administered Medications   Medication Dose Route Frequency    dextrose 5% with KCl 20 mEq/L infusion   IntraVENous CONTINUOUS    heparin (porcine) 25,000 units in 0.45% saline 250 ml infusion  18-36 Units/kg/hr IntraVENous TITRATE    aspirin (ASA) suppository 300 mg  300 mg Rectal DAILY    azithromycin (ZITHROMAX) 500 mg in  mL  500 mg IntraVENous Q24H    sodium chloride (NS) flush 5-40 mL  5-40 mL IntraVENous Q8H    sodium chloride (NS) flush 5-40 mL  5-40 mL IntraVENous PRN    docusate sodium (COLACE) capsule 100 mg  100 mg Oral BID PRN    acetaminophen (TYLENOL) suppository 650 mg  650 mg Rectal Q4H PRN    labetaloL (NORMODYNE;TRANDATE) 20 mg/4 mL (5 mg/mL) injection 5 mg  5 mg IntraVENous Q10MIN PRN  zinc sulfate (ZINCATE) 220 (50) mg capsule 1 Cap  1 Cap Oral DAILY    melatonin tablet 3 mg  3 mg Oral QHS    ascorbic acid (vitamin C) (VITAMIN C) tablet 500 mg  500 mg Oral DAILY    atorvastatin (LIPITOR) tablet 80 mg  80 mg Oral QHS     Current Outpatient Medications   Medication Sig    ondansetron hcl (ZOFRAN) 4 mg tablet Take 2 Tabs by mouth every eight (8) hours as needed for Nausea. Allergies: Benadryl [diphenhydramine hcl]    History reviewed. No pertinent family history.   Social History     Socioeconomic History    Marital status:      Spouse name: Not on file    Number of children: Not on file    Years of education: Not on file    Highest education level: Not on file   Occupational History    Not on file   Social Needs    Financial resource strain: Not on file    Food insecurity     Worry: Not on file     Inability: Not on file    Transportation needs     Medical: Not on file     Non-medical: Not on file   Tobacco Use    Smoking status: Current Every Day Smoker     Packs/day: 1.00    Smokeless tobacco: Never Used   Substance and Sexual Activity    Alcohol use: Yes     Comment: socially    Drug use: Never    Sexual activity: Not on file   Lifestyle    Physical activity     Days per week: Not on file     Minutes per session: Not on file    Stress: Not on file   Relationships    Social connections     Talks on phone: Not on file     Gets together: Not on file     Attends Oriental orthodox service: Not on file     Active member of club or organization: Not on file     Attends meetings of clubs or organizations: Not on file     Relationship status: Not on file    Intimate partner violence     Fear of current or ex partner: Not on file     Emotionally abused: Not on file     Physically abused: Not on file     Forced sexual activity: Not on file   Other Topics Concern    Not on file   Social History Narrative    Not on file     Social History     Tobacco Use   Smoking Status Current Every Day Smoker    Packs/day: 1.00   Smokeless Tobacco Never Used        Temp (24hrs), Av °F (36.7 °C), Min:97.4 °F (36.3 °C), Max:98.6 °F (37 °C)    Visit Vitals  /89   Pulse 93   Temp 97.4 °F (36.3 °C)   Resp 20   Ht 5' 3\" (1.6 m)   Wt 62.1 kg (137 lb)   SpO2 100%   BMI 24.27 kg/m²       ROS: 12 point ROS obtained in details. Pertinent positives as mentioned in HPI,   otherwise negative    Physical Exam:    General:  Alert, cooperative, no acute distress    Pulmonary: wet sounds from upper airway, no wheezing  Cardiovascular: Regular rate and Rhythm. GI:  Soft, Non distended, Non tender. + Bowel sounds. Extremities:  No edema. Psych: Good insight. Not anxious or agitated.   Neurologic: Alert awake, a phasic, motor strength left side 5/5, motor strength right side 0/5    Labs: Results:   Chemistry Recent Labs     20  0812 20  1537   GLU 99 113*   * 147*   K 3.0* 3.6   * 114*   CO2 27 23   BUN 16 17   CREA 0.75 0.94   CA 8.5 8.9   AGAP 10 10   BUCR 21* 18   AP  --  117   TP  --  7.8   ALB  --  3.5   GLOB  --  4.3*   AGRAT  --  0.8      CBC w/Diff Recent Labs     20  0812 20  1537   WBC 18.1* 13.2   RBC 5.61* 5.47*   HGB 15.9 15.5   HCT 48.7* 47.2*    237   GRANS 88* 77*   LYMPH 7* 12*   EOS 0 0      Microbiology Recent Labs     20  2100 20  2040   CULT NO GROWTH AFTER 5 HOURS NO GROWTH AFTER 5 HOURS          RADIOLOGY:    All available imaging studies/reports in [facility] for this admission were reviewed    Dr. Meir Figueredo, Infectious Disease Specialist  [phone]  2020  2:28 PM

## 2020-08-20 NOTE — PROGRESS NOTES
[facility]ist [facility]  Progress Note    Patient: Artemio Drake Age: 60 y.o. : 1955 MR#: [id number] SSN: [id number]  Date: 2020     Subjective:   Lethargic. Oriented to self. Attempting to speak but only speaking words with signs of aphagia. Right side flaccid. NAD. Assessment/Plan:   1. Acute/subacute left MCA CVA  2. Right side weakness related to CVA  3. Dysphagia   4. Right side pneumonia concern for aspiration vs CPAP vs COVID -19 pneumonia   5. Elevated BNP concern for CHF  6. Elevated troponin  7. PE, left-sided PE with infarction  8. HTN  9. History of ascites  10. Hypernatremia  11. Hypokalemia   12. Elevated liver function    Plan  1. Neurology consult. Follow stroke protocol. ASA, statin, permissive BP  2. Heparin infusion for PE  3. COVID-19 test pending. Monitor inflammatory markers. Continue IV abx, vit c/d, zinc  4. CTA pending. MRI brain pending. KUB pending. XR orbit pending. Duplex scan pending  5. Speech recommendations NPO for dysphagia. PT/OT eval and treat   6. Cardiology consult to evaluate troponin and elevated BNP. Input appreciated  7. Monitor metabolic panel and replete   8. Monitor liver functions  9. Spoke with sister Kayleigh Bermeo [phone]. 10. Palliative care consult.    Additional Notes:      Case discussed with:  [x]Patient  [x]Family  []Nursing  []Case Management  DVT Prophylaxis:  []Lovenox  []Hep SQ  []SCDs  []Coumadin   [x]On Heparin gtt    Objective:   VS:   Visit Vitals  BP (!) 144/125   Pulse (!) 101   Temp 97.4 °F (36.3 °C)   Resp 20   Ht 5' 3\" (1.6 m)   Wt 62.1 kg (137 lb)   SpO2 100%   BMI 24.27 kg/m²      Tmax/24hrs: Temp (24hrs), Av °F (36.7 °C), Min:97.4 °F (36.3 °C), Max:98.6 °F (37 °C)      Intake/Output Summary (Last 24 hours) at 2020 1251  Last data filed at 2020 0916  Gross per 24 hour   Intake 100 ml   Output 1200 ml   Net -1100 ml       General:  Lethargic NAD  Cardiovascular: RRR  Pulmonary:  LSC throughout; respiratory effort WNL  GI:  +BS in all four quadrants, soft, non-tender  Extremities:  Right arm flaccid, RLE flaccid  Neuro:lethargic, oriented to self         Labs:    Recent Results (from the past 24 hour(s))   CBC WITH AUTOMATED DIFF    Collection Time: 08/19/20  3:37 PM   Result Value Ref Range    WBC 13.2 4.6 - 13.2 K/uL    RBC 5.47 (H) 4.20 - 5.30 M/uL    HGB 15.5 12.0 - 16.0 g/dL    HCT 47.2 (H) 35.0 - 45.0 %    MCV 86.3 74.0 - 97.0 FL    MCH 28.3 24.0 - 34.0 PG    MCHC 32.8 31.0 - 37.0 g/dL    RDW 15.5 (H) 11.6 - 14.5 %    PLATELET 606 859 - 726 K/uL    MPV 11.8 9.2 - 11.8 FL    NEUTROPHILS 77 (H) 40 - 73 %    LYMPHOCYTES 12 (L) 21 - 52 %    MONOCYTES 11 (H) 3 - 10 %    EOSINOPHILS 0 0 - 5 %    BASOPHILS 0 0 - 2 %    ABS. NEUTROPHILS 10.2 (H) 1.8 - 8.0 K/UL    ABS. LYMPHOCYTES 1.6 0.9 - 3.6 K/UL    ABS. MONOCYTES 1.5 (H) 0.05 - 1.2 K/UL    ABS. EOSINOPHILS 0.0 0.0 - 0.4 K/UL    ABS. BASOPHILS 0.0 0.0 - 0.1 K/UL    DF AUTOMATED     PROTHROMBIN TIME + INR    Collection Time: 08/19/20  3:37 PM   Result Value Ref Range    Prothrombin time 15.3 (H) 11.5 - 15.2 sec    INR 1.2 0.8 - 1.2     METABOLIC PANEL, COMPREHENSIVE    Collection Time: 08/19/20  3:37 PM   Result Value Ref Range    Sodium 147 (H) 136 - 145 mmol/L    Potassium 3.6 3.5 - 5.5 mmol/L    Chloride 114 (H) 100 - 111 mmol/L    CO2 23 21 - 32 mmol/L    Anion gap 10 3.0 - 18 mmol/L    Glucose 113 (H) 74 - 99 mg/dL    BUN 17 7.0 - 18 MG/DL    Creatinine 0.94 0.6 - 1.3 MG/DL    BUN/Creatinine ratio 18 12 - 20      GFR est AA >60 >60 ml/min/1.73m2    GFR est non-AA >60 >60 ml/min/1.73m2    Calcium 8.9 8.5 - 10.1 MG/DL    Bilirubin, total 1.7 (H) 0.2 - 1.0 MG/DL    ALT (SGPT) 65 (H) 13 - 56 U/L    AST (SGOT) 47 (H) 10 - 38 U/L    Alk.  phosphatase 117 45 - 117 U/L    Protein, total 7.8 6.4 - 8.2 g/dL    Albumin 3.5 3.4 - 5.0 g/dL    Globulin 4.3 (H) 2.0 - 4.0 g/dL    A-G Ratio 0.8 0.8 - 1.7     NT-PRO BNP    Collection Time: 08/19/20  3:37 PM   Result Value Ref Range    NT pro-BNP 13,764 (H) 0 - 900 PG/ML   CARDIAC PANEL,(CK, CKMB & TROPONIN)    Collection Time: 08/19/20  3:37 PM   Result Value Ref Range    CK - MB 7.7 (H) <3.6 ng/ml    CK-MB Index 1.1 0.0 - 4.0 %     (H) 26 - 192 U/L    Troponin-I, QT 0.43 (H) 0.0 - 0.045 NG/ML   EKG, 12 LEAD, INITIAL    Collection Time: 08/19/20  4:36 PM   Result Value Ref Range    Ventricular Rate 85 BPM    Atrial Rate 85 BPM    P-R Interval 142 ms    QRS Duration 140 ms    Q-T Interval 422 ms    QTC Calculation (Bezet) 502 ms    Calculated P Axis 55 degrees    Calculated R Axis 25 degrees    Calculated T Axis 89 degrees    Diagnosis       Sinus rhythm with frequent premature ventricular complexes  Possible Left atrial enlargement  Left bundle branch block  Nonspecific T wave abnormality  Abnormal ECG  When compared with ECG of 09-FEB-2016 11:42,  No significant change was found  Confirmed by Sultana Arroyo MD, ----- [id number]) on 8/20/2020 8:07:04 AM     EKG, 12 LEAD, SUBSEQUENT    Collection Time: 08/19/20  8:36 PM   Result Value Ref Range    Ventricular Rate 96 BPM    Atrial Rate 96 BPM    P-R Interval 152 ms    QRS Duration 142 ms    Q-T Interval 406 ms    QTC Calculation (Bezet) 512 ms    Calculated P Axis 47 degrees    Calculated R Axis 13 degrees    Calculated T Axis 70 degrees    Diagnosis       Sinus rhythm with occasional premature ventricular complexes  Biatrial enlargement  Left bundle branch block  Abnormal ECG  When compared with ECG of 19-AUG-2020 16:36,  No significant change was found  Confirmed by Sultana Arroyo MD, ----- [id number]) on 8/20/2020 8:06:04 AM     CULTURE, BLOOD    Collection Time: 08/19/20  8:40 PM    Specimen: Blood   Result Value Ref Range    Special Requests: NO SPECIAL REQUESTS      Culture result: NO GROWTH AFTER 5 HOURS     CULTURE, BLOOD    Collection Time: 08/19/20  9:00 PM    Specimen: Blood   Result Value Ref Range    Special Requests: NO SPECIAL REQUESTS Culture result: NO GROWTH AFTER 5 HOURS     D DIMER    Collection Time: 08/19/20  9:12 PM   Result Value Ref Range    D DIMER 14.83 (H) <0.46 ug/ml(FEU)   FERRITIN    Collection Time: 08/19/20  9:12 PM   Result Value Ref Range    Ferritin 79 8 - 388 NG/ML   CK    Collection Time: 08/19/20  9:12 PM   Result Value Ref Range     (H) 26 - 192 U/L   C REACTIVE PROTEIN, QT    Collection Time: 08/19/20  9:12 PM   Result Value Ref Range    C-Reactive protein 10.1 (H) 0 - 0.3 mg/dL   PROCALCITONIN    Collection Time: 08/19/20  9:12 PM   Result Value Ref Range    Procalcitonin <0.05 ng/mL   LD    Collection Time: 08/19/20  9:12 PM   Result Value Ref Range     (H) 81 - 234 U/L   ETHYL ALCOHOL    Collection Time: 08/19/20  9:12 PM   Result Value Ref Range    ALCOHOL(ETHYL),SERUM <3 0 - 3 MG/DL   TROPONIN I    Collection Time: 08/19/20  9:12 PM   Result Value Ref Range    Troponin-I, QT 0.56 (H) 0.0 - 0.045 NG/ML   CK-MB,QT    Collection Time: 08/19/20  9:12 PM   Result Value Ref Range    CK - MB 5.5 (H) <3.6 ng/ml    CK-MB Index 0.7 0.0 - 4.0 %   URINALYSIS W/ RFLX MICROSCOPIC    Collection Time: 08/20/20  1:59 AM   Result Value Ref Range    Color YELLOW      Appearance CLEAR      Specific gravity 1.019 1.005 - 1.030      pH (UA) 5.0 5.0 - 8.0      Protein 30 (A) NEG mg/dL    Glucose Negative NEG mg/dL    Ketone Negative NEG mg/dL    Bilirubin Negative NEG      Blood SMALL (A) NEG      Urobilinogen 0.2 0.2 - 1.0 EU/dL    Nitrites Negative NEG      Leukocyte Esterase Negative NEG     DRUG SCREEN, URINE    Collection Time: 08/20/20  1:59 AM   Result Value Ref Range    BENZODIAZEPINES Negative NEG      BARBITURATES Negative NEG      THC (TH-CANNABINOL) Negative NEG      OPIATES Negative NEG      PCP(PHENCYCLIDINE) Negative NEG      COCAINE Negative NEG      AMPHETAMINES Negative NEG      METHADONE Negative NEG      HDSCOM (NOTE)    SARS-COV-2    Collection Time: 08/20/20  1:59 AM   Result Value Ref Range SARS-CoV-2 PENDING     Specimen source Nasopharyngeal      Specimen type NP Swab     URINE MICROSCOPIC ONLY    Collection Time: 08/20/20  1:59 AM   Result Value Ref Range    WBC Negative 0 - 4 /hpf    RBC 0 to 3 0 - 5 /hpf    Epithelial cells FEW 0 - 5 /lpf    Bacteria Negative NEG /hpf   D DIMER    Collection Time: 08/20/20  8:12 AM   Result Value Ref Range    D DIMER 16.61 (H) <0.46 ug/ml(FEU)   FERRITIN    Collection Time: 08/20/20  8:12 AM   Result Value Ref Range    Ferritin 111 8 - 388 NG/ML   C REACTIVE PROTEIN, QT    Collection Time: 08/20/20  8:12 AM   Result Value Ref Range    C-Reactive protein 15.9 (H) 0 - 0.3 mg/dL   PROCALCITONIN    Collection Time: 08/20/20  8:12 AM   Result Value Ref Range    Procalcitonin 0.24 ng/mL   LD    Collection Time: 08/20/20  8:12 AM   Result Value Ref Range     (H) 81 - 234 U/L   LIPID PANEL    Collection Time: 08/20/20  8:12 AM   Result Value Ref Range    LIPID PROFILE          Cholesterol, total 153 <200 MG/DL    Triglyceride 99 <150 MG/DL    HDL Cholesterol 45 40 - 60 MG/DL    LDL, calculated 88.2 0 - 100 MG/DL    VLDL, calculated 19.8 MG/DL    CHOL/HDL Ratio 3.4 0 - 5.0     HEMOGLOBIN A1C WITH EAG    Collection Time: 08/20/20  8:12 AM   Result Value Ref Range    Hemoglobin A1c 5.7 (H) 4.2 - 5.6 %    Est. average glucose 913 mg/dL   METABOLIC PANEL, BASIC    Collection Time: 08/20/20  8:12 AM   Result Value Ref Range    Sodium 150 (H) 136 - 145 mmol/L    Potassium 3.0 (L) 3.5 - 5.5 mmol/L    Chloride 113 (H) 100 - 111 mmol/L    CO2 27 21 - 32 mmol/L    Anion gap 10 3.0 - 18 mmol/L    Glucose 99 74 - 99 mg/dL    BUN 16 7.0 - 18 MG/DL    Creatinine 0.75 0.6 - 1.3 MG/DL    BUN/Creatinine ratio 21 (H) 12 - 20      GFR est AA >60 >60 ml/min/1.73m2    GFR est non-AA >60 >60 ml/min/1.73m2    Calcium 8.5 8.5 - 10.1 MG/DL   CBC WITH AUTOMATED DIFF    Collection Time: 08/20/20  8:12 AM   Result Value Ref Range    WBC 18.1 (H) 4.6 - 13.2 K/uL    RBC 5.61 (H) 4.20 - 5.30 M/uL HGB 15.9 12.0 - 16.0 g/dL    HCT 48.7 (H) 35.0 - 45.0 %    MCV 86.8 74.0 - 97.0 FL    MCH 28.3 24.0 - 34.0 PG    MCHC 32.6 31.0 - 37.0 g/dL    RDW 15.7 (H) 11.6 - 14.5 %    PLATELET 106 628 - 007 K/uL    MPV 11.8 9.2 - 11.8 FL    NEUTROPHILS 88 (H) 42 - 75 %    LYMPHOCYTES 7 (L) 20 - 51 %    MONOCYTES 5 2 - 9 %    EOSINOPHILS 0 0 - 5 %    BASOPHILS 0 0 - 3 %    ABS. NEUTROPHILS 15.9 (H) 1.8 - 8.0 K/UL    ABS. LYMPHOCYTES 1.3 0.8 - 3.5 K/UL    ABS. MONOCYTES 0.9 0 - 1.0 K/UL    ABS. EOSINOPHILS 0.0 0.0 - 0.4 K/UL    ABS.  BASOPHILS 0.0 0.0 - 0.06 K/UL    DF MANUAL      PLATELET COMMENTS ADEQUATE PLATELETS      RBC COMMENTS NORMOCYTIC, NORMOCHROMIC      RBC COMMENTS POLYCHROMASIA  1+       PHOSPHORUS    Collection Time: 08/20/20  8:12 AM   Result Value Ref Range    Phosphorus 3.8 2.5 - 4.9 MG/DL   MAGNESIUM    Collection Time: 08/20/20  8:12 AM   Result Value Ref Range    Magnesium 2.1 1.6 - 2.6 mg/dL   CARDIAC PANEL,(CK, CKMB & TROPONIN)    Collection Time: 08/20/20  8:12 AM   Result Value Ref Range    CK - MB 3.4 <3.6 ng/ml    CK-MB Index 0.5 0.0 - 4.0 %     (H) 26 - 192 U/L    Troponin-I, QT 0.40 (H) 0.0 - 0.045 NG/ML   PTT    Collection Time: 08/20/20  8:12 AM   Result Value Ref Range    aPTT 26.9 23.0 - 36.4 SEC       Signed By: Rosalia Tapia NP     August 20, 2020

## 2020-08-20 NOTE — ANESTHESIA PREPROCEDURE EVALUATION
Relevant Problems   No relevant active problems       Anesthetic History   No history of anesthetic complications            Review of Systems / Medical History  Patient summary reviewed and pertinent labs reviewed    Pulmonary          Pneumonia      Comments: Aspiration pneumonia   PE   Neuro/Psych       CVA      Comments: Evolving ischemic stroke  Right hemiplegia  Aphasia Cardiovascular    Hypertension: poorly controlled        Dysrhythmias       Exercise tolerance: <4 METS  Comments: LBBB  DVT  PE   GI/Hepatic/Renal  Within defined limits              Endo/Other  Within defined limits           Other Findings              Physical Exam    Airway            Comments: Unable to assess; Pt can't or won't open her mouth Cardiovascular  Regular rate and rhythm,  S1 and S2 normal,  no murmur, click, rub, or gallop             Dental      Comments: Unable to assess   Pulmonary      Decreased breath sounds    Rales       Abdominal  GI exam deferred       Other Findings            Anesthetic Plan    ASA: 4  Anesthesia type: MAC  Only monitoring vital signs;   No sedation will be given          Anesthetic plan and risks discussed with: Family and Sibling

## 2020-08-20 NOTE — H&P
Hospitalist Admission History and Physical    NAME:  Felisa Beltran   :   1955   MRN:   [id number]     PCP:  Reymundo Campoverde MD  Date/Time:  2020 8:43 PM    Subjective:   CHIEF COMPLAINT: New onset weakness    HISTORY OF PRESENT ILLNESS:     Leeroy Bautista is a 60 y.o.  female who presents with significant new onset weakness to right side of body. Discussed with sister who is primary support states patient was in her normal state of health when she spoke to her on 2020. Sister became concerned when she had not heard from her yesterday and ultimately required police about welfare check earlier today. Patient was transferred via EMS to [facility] ED. History is severely limited from patient as she nods head yes to all questions asked. Sister is with very limited knowledge and states she does not know what medications patient takes on a regular basis. Only medical history that sister is aware of is that she will periodically have fluid drained off her abdomen but does not know when this last occurred. sister does not know if patient has had a stroke in the past or other medical history to speak of. Uncertain when patient was last seen by PCP. Sister does not know of any sick contacts including any known COVID exposure. Past Medical History:   Diagnosis Date    Hypertension       History reviewed. No pertinent surgical history. Social History     Tobacco Use    Smoking status: Current Every Day Smoker     Packs/day: 1.00    Smokeless tobacco: Never Used   Substance Use Topics    Alcohol use: Yes     Comment: socially        History reviewed. No pertinent family history. Allergies   Allergen Reactions    Benadryl [Diphenhydramine Hcl] Hives        Prior to Admission Medications   Prescriptions Last Dose Informant Patient Reported? Taking?   ondansetron hcl (ZOFRAN) 4 mg tablet   No No   Sig: Take 2 Tabs by mouth every eight (8) hours as needed for Nausea. Facility-Administered Medications: None     REVIEW OF SYSTEMS:     [x] Unable to obtain  ROS due to  [x]mental status change  []sedated   []intubated   []Total of 12 systems reviewed as follows:    Objective:   VITALS:    Visit Vitals  BP (!) 136/95   Pulse 84   Temp 97.4 °F (36.3 °C)   Resp 23   Ht 5' 3\" (1.6 m)   Wt 62.1 kg (137 lb)   SpO2 97%   BMI 24.27 kg/m²     Temp (24hrs), Av °F (36.7 °C), Min:97.4 °F (36.3 °C), Max:98.6 °F (37 °C)    PHYSICAL EXAM:   General:          Alert, easily arouses to stimuli  HEENT:           Sclera anicteric. Conjunctiva pink. Mucous membranes                          Dry  Neck:               Supple. Trachea midline. No accessory muscle use. CV:                  Regular rate and rhythm. S1S2+  Lungs:              Coarse breath sounds throughout, mild tachypnea  Abdomen:        Soft, non-tender. Not distended. Bowel sounds normal.   Extremities:     No cyanosis. No edema. Pulses 2+ b/l  Neurologic:      Alert and oriented X 0. Follows some simple commands /completely flaccid to right upper and right lower extremity; some spontaneous movement to left side to resistance  Skin:                Warm and dry. No rashes. LAB DATA REVIEWED:    No components found for: [place]  Recent Labs     20  1537   *   K 3.6   *   CO2 23   BUN 17   CREA 0.94   *   CA 8.9   ALB 3.5   WBC 13.2   HGB 15.5   HCT 47.2*        IMAGING RESULTS:     [x]  I have personally reviewed the actual   [x]CXR  [x]CT scan    Assessment/Plan:      Active Problems:    Cellulitis (2020)      Elevated troponin I level (2020)      Cerebrovascular accident (CVA) ([facility] [address] (2020)      CVA (cerebral vascular accident) ([facility] [address] (2020)     ___________________________________________________  PLAN:    1. Acute/subacute left MCA CVA -neurology consulted /stroke protocol / ASA per rectum / allow permissive hypertension /CTA head neck and CT perfusion ordered and pending. Echo ordered  2. Right-sided pneumonia -concern for aspiration versus CAP versus COVID pneumonia -start azithromycin/Zosyn /SLP consulted /follow COVID labs and novel corona testing /droplet plus precautions  3. Elevated BNP concern for CHF -Lasix x1 given in ED /follow-up echo / cardiology consulted /strict I&O's   4. Elevated troponin -serial troponins /cardiology consulted  5. Hypertension - allow permissive hypertension / unclear what medications (if any) taking recently. 6. ? Ascites - per sister states she had previously had ? Paracentesis / no abdominal distention appreciated currently / monitor    Per discussion with sister patient is full code continue all aggressive aggressive measures. Brothers and sisters are legal next of kin.     Risk of deterioration:  []Low    []Moderate  [x]High    Prophylaxis:  [x]Lovenox  []Coumadin  []Hep SQ  []SCDs  []H2B/PPI    Disposition:  []Home w/ Family   [] PT,OT,RN   [x]SNF/LTC   []SAH/Rehab    Discussed Code Status:    [x]Full Code      []DNR     ___________________________________________________    Care Plan discussed with:    [x]Patient   [x]Family    []ED Care Manager  []ED Doc   [x]Specialist :  ___________________________________________________  Admitting Provider: La Taylor NP

## 2020-08-20 NOTE — PROGRESS NOTES
Problem: Dysphagia (Adult)  Goal: *Acute Goals and Plan of Care (Insert Text)  Description: Patient will:  1. Tolerate PO trials with 0 s/s overt distress in 4/5 trials  2. Utilize compensatory swallow strategies/maneuvers (decrease bite/sip, size/rate, alt. liq/sol) with min cues in 4/5 trials  3. Perform oral-motor/laryngeal exercises to increase oropharyngeal swallow function with min cues  4. Complete an objective swallow study (i.e., MBSS) to assess swallow integrity, r/o aspiration, and determine of safest LRD, min A as indicated/ordered by MD     Recommend:   NPO   Strict aspiration precautions (HOB >30 degrees at all times, Oral care TID)  May benefit from palliative care consult to address ? alternative means of nutrition or comfort feeds if no improvement    Outcome: Progressing Towards Goal     Problem: Communication Impaired (Adult)  Goal: *Acute Goals and Plan of Care (Insert Text)  Description: 1. Pt will complete varied naming tasks with min A in 4/5 trials for improved ability to express basic wants/needs/ideas. 2.  Pt will complete sentence completion tasks with min A in 4/5 trials for improved ability to express basic wants/needs/ideas  3. Pt will answer y/n questions in 4/5 trials for improved ability to express basic wants/needs/ideas. 4. Pt will utilize compensatory speech strategies for improved ability to communicate basic wants/needs/ideas in 4/5 trials given min cues. 5.  Pt will be able to follow one step commands in 4/5 trials given mod multi-modal cues for improved functional independence and quality of life.      Outcome: Progressing Towards Goal    SPEECH LANGUAGE PATHOLOGY EVALUATION    Patient: Shady Mobley (89 y.o. female)  Date: 8/20/2020  Primary Diagnosis: Cellulitis [L03.90]  Cerebrovascular accident (CVA) ([facility] [address] [I63.9]  Elevated troponin I level [R79.89]  Cerebrovascular accident (CVA) ([facility] [address] [I63.9]  CVA (cerebral vascular accident) ([facility] [address] [I63.9]  Precautions: Aspiration      PLOF: Unknown     ASSESSMENT :  DYSPHAGIA EVALUATION:    Based on the objective data described below, the patient presents with severe oral and suspected severe pharyngeal dysphagia. Pt with eyes opening to voice, answering yes to all questions via head nod and unable to follow commands. Oral mech exam limited due to decreased command following but cursory examination revealed R facial droop with decreased labial seal.  Pt demo minimal bolus manipulation/control with x2 ice chip presentations. Swallow delay, decreased laryngeal elevation to palpation, multiple swallows per bolus and throat clear noted across trials. Pt demo inadequate sensorimotor awareness/control for safe and efficient po intake. Recommend continue NPO; meds via alternate route. May need to consider alternative nutrition/hydration source if no improvement. SPEECH-LANGUAGE EVALUATION:   Pt demo expressive and receptive speech deficits impacting functional independence and ability to participate in ADLs. Pt nodded yes to all y/n questions. Unable to follow commands. Decreased orolingual strength/coordination and R facial droop noted. Pt aphonic, unable to complete vowel prolongation despite max multi-modal cues/stim. Will follow to address deficits. Patient's rehabilitation potential is considered to be Good  Factors which may influence rehabilitation potential include:   []            None noted  [x]            Mental ability/status  [x]            Medical condition  []            Home/family situation and support systems  [x]            Safety awareness  []            Pain tolerance/management  []            Other:      PLAN :  Recommendations and Planned Interventions:  As above   Frequency/Duration: Patient will be followed by speech-language pathology 1-2 times per day/4-7 days per week to address goals. Discharge Recommendations:  To Be Determined     SUBJECTIVE:   Patient nodded yes     OBJECTIVE: Past Medical History:   Diagnosis Date    Hypertension    History reviewed. No pertinent surgical history. Prior Level of Function/Home Situation: Unknown   Diet prior to admission: Unknown   Current Diet:  NPO   Cognitive and Communication Status:  Neurologic State: Eyes open to stimulus  Orientation Level: Unable to verbalize  Cognition: Follows commands  Oral Assessment:  Oral Assessment  Labial: Right droop; Impaired coordination;Decreased seal  Dentition: Natural;Poor  Oral Hygiene: Poor  Lingual: Decreased strength;Decreased rate; Incoordinated  Velum: Unable to visualize  Mandible: Restricted  P.O. Trials:  Patient Position: 45 at [facility]  Vocal quality prior to P.O.: Low volume;Breathy  Consistency Presented: Ice chips  How Presented: SLP-fed/presented;Spoon  Bolus Acceptance: Impaired  Bolus Formation/Control: Impaired  Type of Impairment: Delayed;Poor;Posterior;Mastication  Propulsion: Discoordination;Delayed (# of seconds)  Oral Residue: Less than 10% of bolus; Lingual  Initiation of Swallow: Delayed (# of seconds)  Laryngeal Elevation: Decreased;Weak  Aspiration Signs/Symptoms: Change vocal quality;Clear throat  Pharyngeal Phase Characteristics: Poor endurance; Suspected pharyngeal residue; Altered vocal quality  Effective Modifications: None  Cues for Modifications: Maximal  Oral Phase Severity: Severe  Pharyngeal Phase Severity : Severe    PAIN:  Start of Eval: unable to verbalize   End of Eval: unable to verbalize      After treatment:   []            Patient left in no apparent distress sitting up in chair  [x]            Patient left in no apparent distress in bed  [x]            Call bell left within reach  [x]            Nursing notified  []            Family present  []            Caregiver present  []            Bed alarm activated    COMMUNICATION/EDUCATION:   [x]            Aspiration precautions; swallow safety; compensatory techniques.   []            Patient/family have participated as able in goal setting and plan of care. []            Patient/family agree to work toward stated goals and plan of care. []            Patient understands intent and goals of therapy; neutral about participation. [x]            Patient unable to participate in goal setting/plan of care; educ ongoing with interdisciplinary staff  [x]         Posted safety precautions in patient's room.     Thank you for this referral,  Maninder Thomas M.S., [id number] [facility]  Speech-Language Pathologist

## 2020-08-20 NOTE — ANESTHESIA POSTPROCEDURE EVALUATION
Procedure(s):  VENA CAVA FILTER INSERTION. MAC    Anesthesia Post Evaluation      Multimodal analgesia: multimodal analgesia used between 6 hours prior to anesthesia start to PACU discharge  Patient location during evaluation: PACU  Patient participation: complete - patient participated  Level of consciousness: awake  Pain score: 0  Pain management: adequate  Airway patency: patent  Anesthetic complications: no  Cardiovascular status: acceptable  Respiratory status: acceptable  Hydration status: acceptable  Post anesthesia nausea and vomiting:  none  Final Post Anesthesia Temperature Assessment:  Normothermia (36.0-37.5 degrees C)      INITIAL Post-op Vital signs:   Vitals Value Taken Time   /64 8/20/2020  7:44 PM   Temp 36.9 °C (98.4 °F) 8/20/2020  7:14 PM   Pulse 100 8/20/2020  7:46 PM   Resp 25 8/20/2020  7:46 PM   SpO2 98 % 8/20/2020  7:46 PM   Vitals shown include unvalidated device data.

## 2020-08-20 NOTE — ED NOTES
Patient bedside report given to [facility], RN. OR. Patient transported to [address] [place] [address] by [address] [place] [address] staff.

## 2020-08-20 NOTE — PROGRESS NOTES
Problem: Mobility Impaired (Adult and Pediatric)  Goal: *Therapy Goal (Edit Goal, Insert Text)  Description: Physical Therapy Goals  Initiated 8/20/2020 and to be accomplished within 7 day(s)  1. Patient will roll side to side in bed with minimal assistance. 2.  Patient will  move from supine to sit and sit to supine with minimal assistance. 2.  Patient will transfer from bed to chair and chair to bed with moderate assistance  using the least restrictive device. 3.  Patient will perform sit to stand with moderate assistance . 4. Patient will sit EOB for 10 minutes with moderate assistance. 4.  Patient will ambulate with moderate assistance  for 25 feet with the least restrictive device. PLOF: Pt non-verbal, limited history. Per chart review, patient was living alone. Outcome: Progressing Towards Goal     PHYSICAL THERAPY EVALUATION    Patient: Latonia العلي (67 y.o. female)  Date: 8/20/2020  Primary Diagnosis: Cellulitis [L03.90]  Cerebrovascular accident (CVA) ([address] [I63.9]  Elevated troponin I level [R79.89]  Cerebrovascular accident (CVA) ([address] [I63.9]  CVA (cerebral vascular accident) ([address] [I63.9]        Precautions:   Fall, Contact      ASSESSMENT :  Based on the objective data described below, the patient presents with right sided weakness, hypotonic right LE, impaired balance/coordination, decreased activity tolerance, and impaired functional mobility. Patient received supine on stretcher with head turned to the right. Patient is drowsy and lethargic, max verbal/tactile cues to arouse and open eyes. She maintains eyes closed throughout entire session and has decreased command following with slow processing. Patient with low tone in right LE, no active/spontaneous  movement of right LE observed. She follows commands to wiggle toes on left LE, has generally decreased strength.  Patient requires maximum assistance for supine to long sit transfers, reaches left UE to hold onto rail, and requires constant to support to maintain balance. Patient will continue to benefit from skilled PT to maximize safety and independence with functional mobility. Recommending discharge to rehab in order to restore PLOF. Patient will benefit from skilled intervention to address the above impairments. Patient's rehabilitation potential is considered to be Fair  Factors which may influence rehabilitation potential include:   []         None noted  [x]         Mental ability/status  [x]         Medical condition  [x]         Home/family situation and support systems  [x]         Safety awareness  []         Pain tolerance/management  []         Other:      PLAN :  Recommendations and Planned Interventions:   [x]           Bed Mobility Training             [x]    Neuromuscular Re-Education  [x]           Transfer Training                   []    Orthotic/Prosthetic Training  [x]           Gait Training                          []    Modalities  [x]           Therapeutic Exercises           []    Edema Management/Control  [x]           Therapeutic Activities            []    Family Training/Education  [x]           Patient Education  []           Other (comment):    Frequency/Duration: Patient will be followed by physical therapy 1-2 times per day/4-7 days per week to address goals. Discharge Recommendations: Rehab  Further Equipment Recommendations for Discharge: TBD     SUBJECTIVE:   Patient non-verbal, inconsistently nods yes to simple questions. OBJECTIVE DATA SUMMARY:     Past Medical History:   Diagnosis Date    Hypertension    History reviewed. No pertinent surgical history.   Barriers to Learning/Limitations: yes;  cognitive and altered mental status (i.e.Sedation, Confusion)  Compensate with: Visual Cues, Verbal Cues, Tactile Cues, and Kinesthetic Cues  Home Situation:     Critical Behavior:  Neurologic State: Lethargic;Drowsy  Orientation Level: Unable to verbalize  Cognition: Unable to assess (comment) Skin Integumentary  Turgor: Epidermis thin w/ loss of subcut tissue     Strength:    Strength: Grossly decreased, non-functional     Tone & Sensation:   Tone: Abnormal(hypotonic right LE)        Range Of Motion:  AROM: Grossly decreased, non-functional  PROM: Within functional limits     Functional Mobility:  Bed Mobility:     Supine to Sit: Maximum assistance(supine to long sit)  Sit to Supine: Maximum assistance  Balance:   Sitting: Impaired  Sitting - Static: Poor (constant support)      Pain:  Pain level pre-treatment: -/10   Pain level post-treatment: -/10   Pain Intervention(s) : Medication (see MAR); Rest, Ice, Repositioning  Response to intervention: Nurse notified, See doc flow    Activity Tolerance:   Limited  Please refer to the flowsheet for vital signs taken during this treatment. After treatment:   []         Patient left in no apparent distress sitting up in chair  [x]         Patient left in no apparent distress in bed  [x]         Call bell left within reach  []         Nursing notified  []         Caregiver present  []         Bed alarm activated  []         SCDs applied    COMMUNICATION/EDUCATION:   [x]         Role of Physical Therapy in the acute care setting. []         Fall prevention education was provided and the patient/caregiver indicated understanding. [x]         Patient/family have participated as able in goal setting and plan of care. []         Patient/family agree to work toward stated goals and plan of care. []         Patient understands intent and goals of therapy, but is neutral about his/her participation. []         Patient is unable to participate in goal setting/plan of care: ongoing with therapy staff.  []         Other:     Thank you for this referral.  Brian Orozco, PT   Time Calculation: 20 mins      Eval Complexity: History: MEDIUM  Complexity : 1-2 comorbidities / personal factors will impact the outcome/ POC Exam:MEDIUM Complexity : 3 Standardized tests and measures addressing body structure, function, activity limitation and / or participation in recreation  Presentation: MEDIUM Complexity : Evolving with changing characteristics  Clinical Decision Making:Medium Complexity    Overall Complexity:MEDIUM

## 2020-08-20 NOTE — ED NOTES
Assumed care of pt, pt resting on stretcher, awaiting bed for CVA, pt found down at home for presumably 2-4 days, pt lives alone, has had prior stroke with right sided weakness, currently unable to move right arm or leg, able to move left arm and left leg spontaneously at this point. Pt unable to verbally answer questions at this time, can nod yes or no to answer questions. Pt on purwick, stable vitals with congested lung sounds, pt nods yes when asked if she has been exposed to [place].

## 2020-08-20 NOTE — PROGRESS NOTES
Reason for Admission:  Cellulitis [L03.90]  Cerebrovascular accident (CVA) ([facility] [address] [I63.9]  Elevated troponin I level [R79.89]  Cerebrovascular accident (CVA) ([facility] [address] [I63.9]  CVA (cerebral vascular accident) ([facility] [address] [I63.9]                 RUR Score:    9%           Plan for utilizing home health:    No orders at this time. Likelihood of Readmission:   LOW                         Transition of Care Plan:              Initial assessment completed with sister Brown via phone. Cognitive status of patient: Per NP note, patient is Alert but aphasic. Isolation room. Face sheet information confirmed:  yes. sister Brown ([phone]) provided needed information. This patient lives alone in a apartment . Patient was able to navigate steps as needed. Prior to hospitalization, patient was considered to be independent with ADLs/IADLS : yes . Patient has a current ACP document on file: no  Transportation to be determined  upon discharge. Unknown, if medical equipment is  available in the home. Patient is not currently active with home health. If active, agency nam.  Patient has not stayed in a skilled nursing facility or rehab. Was        This patient is on dialysis :no    Freedom of choice signed: no.    Currently, the discharge plan is To be determined. - Will need PT/OT evaluation to assist with discharge planning  CM will continue to monitor for transitional needs for a safe discharge. Patient's current insurance is [address] Management Interventions  PCP Verified by CM: Yes(unknown)  Mode of Transport at Discharge: (to be determined)  Transition of Care Consult (CM Consult): Discharge Planning  Discharge Durable Medical Equipment: No  Physical Therapy Consult: No  Occupational Therapy Consult: No  Speech Therapy Consult: No  Current Support Network: Lives Alone  Discharge Location  Discharge Placement: (to be determined)        ANNA Simmons, RN  Pager # [facility] [place]

## 2020-08-20 NOTE — PROGRESS NOTES
Problem: Self Care Deficits Care Plan (Adult)  Goal: *Acute Goals and Plan of Care (Insert Text)  Description: Occupational Therapy Goals  Initiated 8/20/2020 within 7 day(s). 1.  Patient will perform grooming with supervision/set-up using LUE. 2.  Patient will perform bed mobility in preparation for self-care with moderate assistance . 3. Patient will follow 70% of commands throughout self-care tasks with minimal cues. 4.  Patient will participate in upper extremity therapeutic exercise/activities with minimal assistance/contact guard assist for 8 minutes. 5.  Patient will perform self-feeding with minimal assistance/contact guard assistance. Prior Level of Function: Patient non-verbal, nodded to some questions, not all, eyes closed throughout evaluation     Outcome: Progressing Towards Goal     OCCUPATIONAL THERAPY EVALUATION    Patient: Marizol Jacobo (22 y.o. female)  Date: 8/20/2020  Primary Diagnosis: Cellulitis [L03.90]  Cerebrovascular accident (CVA) ([address] [I63.9]  Elevated troponin I level [R79.89]  Cerebrovascular accident (CVA) ([address] [I63.9]  CVA (cerebral vascular accident) ([address] [I63.9]  Precautions:  Fall, Contact    ASSESSMENT :  Patient cleared to participate in OT evaluation by RN. Upon entering the ED room, the patient was supine in bed, eyes closed, and agreeable to participate in OT evaluation via head nod. Patient was seen with PT to maximize patient safety, participation, and functional mobility in preparation for self-care tasks. Patient non verbal this session but nodding to most cues and following commands. Patient with delayed processing, performed commands approx. after 4-6 seconds. Patient max assistance for supine to long sit transfers, able to use LUE to hold onto rail. Constant support for balance needed.  Based on the objective data described below, the patient presents with decreased strength, decreased AROM, decreased independence, decreased functional balance, and decreased functional mobility, which impedes pt performance in basic self-care/ADL tasks. Patient would benefit from skilled OT to restore PLOF and maximize function. Patient will benefit from skilled intervention to address the above impairments. Patient's rehabilitation potential is considered to be Fair  Factors which may influence rehabilitation potential include:   []             None noted  [x]             Mental ability/status  [x]             Medical condition  [x]             Home/family situation and support systems  [x]             Safety awareness  [x]             Pain tolerance/management  []             Other:      PLAN :  Recommendations and Planned Interventions:   [x]               Self Care Training                  [x]      Therapeutic Activities  [x]               Functional Mobility Training   [x]      Cognitive Retraining  [x]               Therapeutic Exercises           [x]      Endurance Activities  [x]               Balance Training                    [x]      Neuromuscular Re-Education  [x]               Visual/Perceptual Training     [x]      Home Safety Training  [x]               Patient Education                   [x]      Family Training/Education  []               Other (comment):    Frequency/Duration: Patient will be followed by occupational therapy 1-2 times per day/4-7 days per week to address goals. Discharge Recommendations: Rehab  Further Equipment Recommendations for Discharge: TBD pending pt's progress/participation     SUBJECTIVE:   Patient nonverbal this session    OBJECTIVE DATA SUMMARY:     Past Medical History:   Diagnosis Date    Hypertension    History reviewed. No pertinent surgical history.   Barriers to Learning/Limitations: yes;  cognitive  Compensate with: visual, verbal, tactile, kinesthetic cues/model    Home Situation:   Home Situation  Living Alone: Yes  []  Right hand dominant   []  Left hand dominant    Cognitive/Behavioral Status:  Neurologic State: Lethargic;Drowsy  Orientation Level: Unable to verbalize  Cognition: Unable to assess (comment)(inconsistent command following)  Safety/Judgement: Fall prevention    Skin: Intact  Edema: None noted    Vision/Perceptual:    Acuity: (pt did not open eyes for therapist, despite cues)    Coordination: BUE    Fine Motor Skills-Upper: Left Intact; Right Impaired    Gross Motor Skills-Upper: Left Intact; Right Impaired    Balance:  Sitting: Impaired  Sitting - Static: Poor (constant support)    Strength: BUE  Strength: Grossly decreased, non-functional(RUE)    Tone & Sensation: BUE  Tone: Abnormal    Range of Motion: BUE  AROM: Grossly decreased, non-functional(RUE)  PROM: Within functional limits(RUE)    Functional Mobility and Transfers for ADLs:  Bed Mobility:  Supine to Sit: Maximum assistance(supine to long sit)  Sit to Supine: Maximum assistance    ADL Assessment:   Feeding: Moderate assistance    Oral Facial Hygiene/Grooming: Moderate assistance    Bathing: Maximum assistance    Upper Body Dressing: Maximum assistance    Lower Body Dressing: Total assistance    Toileting: Total assistance    ADL Intervention:  Grooming  Grooming Assistance: Minimum assistance(using LUE)  Washing Face: Minimum assistance(using LUE)    Cognitive Retraining  Safety/Judgement: Fall prevention    Pain:  Pain level pre-treatment: -/10 Patient nods yes to pain- did not indicate location  Pain level post-treatment: -/10   Pain Intervention(s): Medication (see MAR); Response to intervention: Nurse notified, See doc flow    Activity Tolerance:   Patient demonstrated decreased activity tolerance during OT evaluation. Please refer to the flowsheet for vital signs taken during this treatment.   After treatment:   [] Patient left in no apparent distress sitting up in chair  [x] Patient left in no apparent distress in bed  [x] Call bell left within reach  [x] Nursing notified  [] Caregiver present  [] Bed alarm activated    COMMUNICATION/EDUCATION:   [x] Role of Occupational Therapy in the acute care setting  [x] Home safety education was provided and the patient/caregiver indicated understanding. [x] Patient/family have participated as able in goal setting and plan of care. [x] Patient/family agree to work toward stated goals and plan of care. [] Patient understands intent and goals of therapy, but is neutral about his/her participation. [] Patient is unable to participate in goal setting and plan of care. Thank you for this referral.  Viktor Borrero OTR/L  Time Calculation: 14 mins     Eval Complexity: History: MEDIUM Complexity : Expanded review of history including physical, cognitive and psychosocial  history ; Examination: HIGH Complexity : 5 or more performance deficits relating to physical, cognitive , or psychosocial skils that result in activity limitations and / or participation restrictions; Decision Making:HIGH Complexity : Patient presents with comorbidities that affect occupational performance.  Signifigant modification of tasks or assistance (eg, physical or verbal) with assessment (s) is necessary to enable patient to complete evaluation

## 2020-08-20 NOTE — PROGRESS NOTES
MRI Safety Screening form needs to be filled out and FAXED to [phone] MRI can be scheduled. If unable to acquire information from patient  [facility] must be contacted, or screening xrays will need to be ordered.     IF pt is Claustrophobic or will need Pain Meds please have these ordered in advance to help facilitate MRI exam.

## 2020-08-21 ENCOUNTER — HOSPITAL ENCOUNTER (INPATIENT)
Dept: CT IMAGING | Age: 65
Discharge: HOME | DRG: 040 | End: 2020-08-21
Attending: INTERNAL MEDICINE
Payer: COMMERCIAL

## 2020-08-21 LAB
ANION GAP SERPL CALC-SCNC: 6 MMOL/L (ref 3–18)
APTT PPP: 90.4 SEC (ref 23–36.4)
BASOPHILS # BLD: 0 K/UL (ref 0–0.06)
BASOPHILS NFR BLD: 0 % (ref 0–3)
BUN SERPL-MCNC: 21 MG/DL (ref 7–18)
BUN/CREAT SERPL: 23 (ref 12–20)
CALCIUM SERPL-MCNC: 8.2 MG/DL (ref 8.5–10.1)
CHLORIDE SERPL-SCNC: 111 MMOL/L (ref 100–111)
CK SERPL-CCNC: 718 U/L (ref 26–192)
CO2 SERPL-SCNC: 26 MMOL/L (ref 21–32)
CREAT SERPL-MCNC: 0.93 MG/DL (ref 0.6–1.3)
CRP SERPL-MCNC: 19.9 MG/DL (ref 0–0.3)
D DIMER PPP FEU-MCNC: 5.72 UG/ML(FEU)
DIFFERENTIAL METHOD BLD: ABNORMAL
EOSINOPHIL # BLD: 0 K/UL (ref 0–0.4)
EOSINOPHIL NFR BLD: 0 % (ref 0–5)
ERYTHROCYTE [DISTWIDTH] IN BLOOD BY AUTOMATED COUNT: 15.8 % (ref 11.6–14.5)
FERRITIN SERPL-MCNC: 225 NG/ML (ref 8–388)
GLUCOSE SERPL-MCNC: 141 MG/DL (ref 74–99)
HCT VFR BLD AUTO: 44.9 % (ref 35–45)
HGB BLD-MCNC: 14.4 G/DL (ref 12–16)
LDH SERPL L TO P-CCNC: 536 U/L (ref 81–234)
LYMPHOCYTES # BLD: 1.2 K/UL (ref 0.8–3.5)
LYMPHOCYTES NFR BLD: 6 % (ref 20–51)
MAGNESIUM SERPL-MCNC: 2.2 MG/DL (ref 1.6–2.6)
MCH RBC QN AUTO: 28.1 PG (ref 24–34)
MCHC RBC AUTO-ENTMCNC: 32.1 G/DL (ref 31–37)
MCV RBC AUTO: 87.5 FL (ref 74–97)
MONOCYTES # BLD: 2.3 K/UL (ref 0–1)
MONOCYTES NFR BLD: 12 % (ref 2–9)
NEUTS SEG # BLD: 15.8 K/UL (ref 1.8–8)
NEUTS SEG NFR BLD: 82 % (ref 42–75)
PLATELET # BLD AUTO: 184 K/UL (ref 135–420)
PLATELET COMMENTS,PCOM: ABNORMAL
PMV BLD AUTO: 12.1 FL (ref 9.2–11.8)
POTASSIUM SERPL-SCNC: 3.7 MMOL/L (ref 3.5–5.5)
PROCALCITONIN SERPL-MCNC: 0.41 NG/ML
RBC # BLD AUTO: 5.13 M/UL (ref 4.2–5.3)
RBC MORPH BLD: ABNORMAL
SARS-COV-2, COV2NT: NOT DETECTED
SODIUM SERPL-SCNC: 143 MMOL/L (ref 136–145)
SOURCE, COVRS: NORMAL
SPECIMEN TYPE, XMCV1T: NORMAL
WBC # BLD AUTO: 19.3 K/UL (ref 4.6–13.2)

## 2020-08-21 PROCEDURE — 82728 ASSAY OF FERRITIN: CPT

## 2020-08-21 PROCEDURE — 36415 COLL VENOUS BLD VENIPUNCTURE: CPT

## 2020-08-21 PROCEDURE — 77030008771 HC TU NG SALEM SUMP -A

## 2020-08-21 PROCEDURE — 80048 BASIC METABOLIC PNL TOTAL CA: CPT

## 2020-08-21 PROCEDURE — 71275 CT ANGIOGRAPHY CHEST: CPT

## 2020-08-21 PROCEDURE — 85730 THROMBOPLASTIN TIME PARTIAL: CPT

## 2020-08-21 PROCEDURE — 74011250637 HC RX REV CODE- 250/637: Performed by: EMERGENCY MEDICINE

## 2020-08-21 PROCEDURE — 74011000258 HC RX REV CODE- 258: Performed by: INTERNAL MEDICINE

## 2020-08-21 PROCEDURE — 65660000000 HC RM CCU STEPDOWN

## 2020-08-21 PROCEDURE — 74011250636 HC RX REV CODE- 250/636: Performed by: INTERNAL MEDICINE

## 2020-08-21 PROCEDURE — 92507 TX SP LANG VOICE COMM INDIV: CPT

## 2020-08-21 PROCEDURE — 82550 ASSAY OF CK (CPK): CPT

## 2020-08-21 PROCEDURE — 84145 PROCALCITONIN (PCT): CPT

## 2020-08-21 PROCEDURE — 94762 N-INVAS EAR/PLS OXIMTRY CONT: CPT

## 2020-08-21 PROCEDURE — 83735 ASSAY OF MAGNESIUM: CPT

## 2020-08-21 PROCEDURE — 85025 COMPLETE CBC W/AUTO DIFF WBC: CPT

## 2020-08-21 PROCEDURE — 86140 C-REACTIVE PROTEIN: CPT

## 2020-08-21 PROCEDURE — 97110 THERAPEUTIC EXERCISES: CPT

## 2020-08-21 PROCEDURE — 83615 LACTATE (LD) (LDH) ENZYME: CPT

## 2020-08-21 PROCEDURE — 85379 FIBRIN DEGRADATION QUANT: CPT

## 2020-08-21 PROCEDURE — 74011000636 HC RX REV CODE- 636: Performed by: INTERNAL MEDICINE

## 2020-08-21 RX ADMIN — PIPERACILLIN SODIUM AND TAZOBACTAM SODIUM 3.38 G: 3; .375 INJECTION, POWDER, LYOPHILIZED, FOR SOLUTION INTRAVENOUS at 05:32

## 2020-08-21 RX ADMIN — PIPERACILLIN SODIUM AND TAZOBACTAM SODIUM 3.38 G: 3; .375 INJECTION, POWDER, LYOPHILIZED, FOR SOLUTION INTRAVENOUS at 23:01

## 2020-08-21 RX ADMIN — PIPERACILLIN SODIUM AND TAZOBACTAM SODIUM 3.38 G: 3; .375 INJECTION, POWDER, LYOPHILIZED, FOR SOLUTION INTRAVENOUS at 08:40

## 2020-08-21 RX ADMIN — HEPARIN SODIUM 20 UNITS/KG/HR: 10000 INJECTION, SOLUTION INTRAVENOUS at 15:00

## 2020-08-21 RX ADMIN — Medication 10 ML: at 23:01

## 2020-08-21 RX ADMIN — Medication 10 ML: at 05:27

## 2020-08-21 RX ADMIN — ASPIRIN 300 MG: 600 SUPPOSITORY RECTAL at 09:00

## 2020-08-21 RX ADMIN — Medication 10 ML: at 14:00

## 2020-08-21 RX ADMIN — DEXTROSE MONOHYDRATE AND POTASSIUM CHLORIDE: 5; .149 INJECTION, SOLUTION INTRAVENOUS at 05:33

## 2020-08-21 RX ADMIN — IOPAMIDOL 100 ML: 755 INJECTION, SOLUTION INTRAVENOUS at 15:19

## 2020-08-21 RX ADMIN — PIPERACILLIN SODIUM AND TAZOBACTAM SODIUM 3.38 G: 3; .375 INJECTION, POWDER, LYOPHILIZED, FOR SOLUTION INTRAVENOUS at 14:20

## 2020-08-21 NOTE — PROGRESS NOTES
Problem: Communication Impaired (Adult)  Goal: *Acute Goals and Plan of Care (Insert Text)  Description: 1. Pt will complete varied naming tasks with min A in 4/5 trials for improved ability to express basic wants/needs/ideas. 2.  Pt will complete sentence completion tasks with min A in 4/5 trials for improved ability to express basic wants/needs/ideas  3. Pt will answer y/n questions in 4/5 trials for improved ability to express basic wants/needs/ideas. 4. Pt will utilize compensatory speech strategies for improved ability to communicate basic wants/needs/ideas in 4/5 trials given min cues. 5.  Pt will be able to follow one step commands in 4/5 trials given mod multi-modal cues for improved functional independence and quality of life. Outcome: Progressing Towards Goal   SPEECH LANGUAGE PATHOLOGY   SPEECH-LANGUAGE TREATMENT    Patient: Ramsey Rondon (50 y.o. female)  Date: 8/21/2020  Diagnosis: Cellulitis [L03.90]  Cerebrovascular accident (CVA) ([address] [I63.9]  Elevated troponin I level [R79.89]  Cerebrovascular accident (CVA) ([address] [I63.9]  CVA (cerebral vascular accident) ([address] [I63.9]   <principal problem not specified>  Procedure(s) (LRB):  VENA CAVA FILTER INSERTION (Right) 1 Day Post-Op    Precautions: High Aspiration risk; Droplet+ isolation  PLOF: Per H&P    ASSESSMENT:  Pt seen at bedside this date for ST follow up. Pt continues to be aphonic and confused. SPO2 ~92% on RA; Her RR rapidly fluctuated from 15-55 and ~ 44 at rest. Pt noted to be dyspneic. Based on clinical presentation this date SLP withheld all po trials this date; RECs: continue NPO with alternate route meds; consideration for alternative means of nutrition/ hydration source if no improvement. SLP  attempted to provided max skilled instruction and multi modal cues to implement purse lip breathing, however despite maxA /reps pt unable to follow commands to sequence.     Facilitation of receptive language tasks completed:  Y/N questions via head gestures:  with 40% acc with inconsistent responses when able to respond given mod reps/ modeling. Follow 1-step directions: 30% acc given max A/ reps/ hand over hand. Pt very lethargic and pointed to the door up completion of tasks stated above. SLP asked pt if she would like to rest, and she nodded yes. D/W RN AZUL who acknowledged and reported she was aware of pt's fluctuating RR; she  also confirmed diet recs. ST will continue to follow. Progression toward goals:  []       Improving appropriately and progressing toward goals  [x]       Improving slowly and progressing toward goals  []       Not making progress toward goals and plan of care will be adjusted     PLAN:  Patient continues to benefit from skilled intervention to address the above impairments. Continue treatment per established plan of care. Discharge Recommendations: To Be Determined     SUBJECTIVE:   Patient is not verbalizing at this time; pt intermittent groaning. OBJECTIVE:   Mental Status:  Neurologic State: Alert, Eyes do not open to any stimulus  Orientation Level: Unable to verbalize  Cognition: Unable to assess (comment)(inconsistent command following)  Perception: (pt with eyes closed this session; cues to turn head to the L)  Perseveration: No perseveration noted  Safety/Judgement: Fall prevention  Treatment & Interventions: Motor Speech:        Language Comprehension and Expression:     Verbal Expression    Neuro-Linguistics:       Neuro-Linguistic Exercises: Auditory/Reading/Writing Activities:              Voice:          Response & Tolerance to Activities:       PAIN:  Pain level pre-treatment: unable to verbalize/10   Pain level post-treatment: unable to verbalize/10   Pain Intervention(s): Medication (see MAR);  Rest, Ice, Repositioning  Response to intervention: Nurse notified, See doc flow    After treatment:   [x]       Patient left in no apparent distress sitting up in chair  []       Patient left in no apparent distress in bed  [x]       Call bell left within reach  [x]       Nursing notified  []       Caregiver present  []       Bed alarm activated      COMMUNICATION/EDUCATION:   [x]        Compensatory speech/language/comprehension techniques  []        Patient unable to participate in education; education ongoing with staff  []  Posted safety precautions/strategies in patient's room.     Birgit Miner, SLP  Time Calculation: 9 mins  MA, CCC-SLP  Speech-Language Pathologist

## 2020-08-21 NOTE — PERIOP NOTES
1914  Received patient from CVT/OR accompanied by OR nurse and CRNA. Performed 2 nurse check on heparin drip. Patient alert, unable to give verbal response but answers questions with head nods. Patient has no movement to right side. Patient being treated as COVID rule out with Droplet Plus precautions. CRNA stated patient did not receive any anesthesia. 2000  Attempted to call report to [facility]. Nurse did not take report due to patient diagnosis of CVA and Telemetry. Patient required higher level of care. Notified Nursing Supervisor who requested clarification from Hospitalist if patient is being treated as a new CVA in order to assign patient to proper level of care unit. 2030  Hospitalist called back. Wanted patient sent to Telemetry and treated as CVA per admitting Hospitalist's original order. Notified Nursing Supervisor and waiting for bed assignment. 2115  Lab came and shaylee PTT. MRI also called to see if patient was available to have MRI scan done. Notified tech patient had new IVC filter placed. Will send documentation to MRI Tech to review and see if patient is able to have scan completed. 2001  Increased patient's Heparin drip to 20 units/kg. MRI called said they were ready for patient. 2017  Tried to get patient ready for MRI. Patient agitated and not following commands. Patient keeps taking covers off as well as part of gown with left arm. Patient keeps taking surgical mask and nasal canula off her face. Patient at this time would not tolerate lying flat without movement at this time without some sedation. Called and notified MRI tech. Will report to Hospitalist on call. Still waiting on Nursing Supervisor for patient's disposition. 2330  Patient incontinent of urine. Provided incontinence care and changed gown and sheets. Patient cooperative and appeared calm and relaxed afterwards. Patient had NSR on monitored with heart rate in the 80s.   Still waiting for supervisor to assign bed for patient.

## 2020-08-21 NOTE — ROUTINE PROCESS
Received verbal report from Semaj Perkins RN, report included SBAR, MAR, and Kardex. Gave verbal report to [employer], report included SBAR, MAR, and Kardex.

## 2020-08-21 NOTE — PROGRESS NOTES
ARU/IPR REFERRAL CONTACT NOTE  [id number] [place] [address] for Physical Rehabilitation    RE: Davidson Boateng     Thank you for the opportunity to review this patient's case for admission to [id number] [place] [address] for Physical Rehabilitation. Based on our pre-admission screening:     [x Our Team/Medical Director is following this case. Comments: Patient is presently on a heparin gtt and is NPO with recommendation from  for consideration of alternate means of nutrition/hydration source if she has no improvement. Will continue to follow for medical stability and therapy progress. Again, Thank you for this referral. Should you have any questions please do not hesitate to call. Sincerely,  Allyssa Segovia. Rafa Peter, [id number]   Rafa Peter, RN  Admissions [facility] for [facility]  [phone]

## 2020-08-21 NOTE — PROGRESS NOTES
Problem: Falls - Risk of  Goal: *Absence of Falls  Description: Document Rock Boswellncer Fall Risk and appropriate interventions in the flowsheet.   Outcome: Progressing Towards Goal  Note: Fall Risk Interventions:  Mobility Interventions: Bed/chair exit alarm         Medication Interventions: Bed/chair exit alarm    Elimination Interventions: Call light in reach    History of Falls Interventions: Bed/chair exit alarm         Problem: Patient Education: Go to Patient Education Activity  Goal: Patient/Family Education  Outcome: Progressing Towards Goal     Problem: Patient Education: Go to Patient Education Activity  Goal: Patient/Family Education  Outcome: Progressing Towards Goal     Problem: Patient Education: Go to Patient Education Activity  Goal: Patient/Family Education  Outcome: Progressing Towards Goal     Problem: Patient Education: Go to Patient Education Activity  Goal: Patient/Family Education  Outcome: Progressing Towards Goal     Problem: Patient Education: Go to Patient Education Activity  Goal: Patient/Family Education  Outcome: Progressing Towards Goal

## 2020-08-21 NOTE — PROGRESS NOTES
Re:  Jason Mort up visit     8/21/2020 2:46 PM    SSN: [id number]    Subjective:   France Elizabeth was seen on follow up. She is awake and tracks. She continued to have the complete weakness of the right side. She is mute with impaired comprehension. CT of the chest was not done. For the following embolism seen on the left side, a doppler ultrasound of the lower extremities was done and showed acute occlusive thrombus in the proximal and distal veins. Patient had an inferior vena cava filter. She was started on heparin drip. Not getting any boluses.     Medications:    Current Facility-Administered Medications   Medication Dose Route Frequency Provider Last Rate Last Dose    dextrose 5% with KCl 20 mEq/L infusion   IntraVENous CONTINUOUS Se Humphries MD 75 mL/hr at 08/21/20 0533      heparin (porcine) 25,000 units in 0.45% saline 250 ml infusion  18-36 Units/kg/hr IntraVENous TITRATE Se Humphries MD 12.4 mL/hr at 08/21/20 0808 20 Units/kg/hr at 08/21/20 0808    piperacillin-tazobactam (ZOSYN) 3.375 g in 0.9% sodium chloride (MBP/ADV) 100 mL MBP  3.375 g IntraVENous Q6H Drake DIAS  mL/hr at 08/21/20 1420 3.375 g at 08/21/20 1420    albuterol-ipratropium (DUO-NEB) 2.5 MG-0.5 MG/3 ML  3 mL Nebulization Q4H RT Se Humphries MD   Stopped at 08/21/20 0800    aspirin (ASA) suppository 300 mg  300 mg Rectal DAILY Humphrey Pry K, DO   300 mg at 08/21/20 0900    sodium chloride (NS) flush 5-40 mL  5-40 mL IntraVENous Q8H Dalia Garcia MD   10 mL at 08/21/20 0527    sodium chloride (NS) flush 5-40 mL  5-40 mL IntraVENous PRN Gabi Khanna MD        docusate sodium (COLACE) capsule 100 mg  100 mg Oral BID PRN Gabi Khanna MD        acetaminophen (TYLENOL) suppository 650 mg  650 mg Rectal Q4H PRN Gabi Khanna MD        labetaloL (NORMODYNE;TRANDATE) 20 mg/4 mL (5 mg/mL) injection 5 mg  5 mg IntraVENous Q10MIN PRN Ladi Abdi MD   5 mg at 08/20/20 1218    zinc sulfate (ZINCATE) 220 (50) mg capsule 1 Cap  1 Cap Oral DAILY Yasir Khanna MD   Stopped at 08/20/20 0900    melatonin tablet 3 mg  3 mg Oral QHS Ladi Abdi MD   Stopped at 08/19/20 2200    ascorbic acid (vitamin C) (VITAMIN C) tablet 500 mg  500 mg Oral DAILY Ladi Abdi MD   Stopped at 08/20/20 0900    atorvastatin (LIPITOR) tablet 80 mg  80 mg Oral QHS Colin Horton DO   Stopped at 08/19/20 2200       Vital signs:    Visit Vitals  /83 (BP 1 Location: Left arm, BP Patient Position: At rest)   Pulse 80   Temp 97.4 °F (36.3 °C)   Resp 30   Ht 5' 3\" (1.6 m)   Wt 72 kg (158 lb 11.2 oz)   SpO2 98%   BMI 28.11 kg/m²       Review of Systems:   Unable to obtain due to her clinical condition. Patient Active Problem List   Diagnosis Code    Cellulitis L03.90    Elevated troponin I level R79.89    Cerebrovascular accident (CVA) ([address] I63.9    CVA (cerebral vascular accident) ([address] I63.9         Objective:   GENERAL:                  In no apparent distress. EXTREMITIES:           Slightly increased right lower extremity tone. No movement on the right upper or lower extremity. HEAD:                         Ear, nose, and throat appear to be without trauma. The patient is normocephalic.     NEUROLOGIC EXAMINATION    Mental status: Awake, alert, not answering any orientation questions; also follows simple midline commands; no gaze deviation. Speech and languge: Mute; impaired comprehension. CN: BTT only from the left side;  EOMI, PERRLA, right lower facial palsy, palate elevation symmetric bilat, tongue midline  Motor: Slightly decreased tone over the right upper extremity and slightly increased tone over the right lower extremity; strength is on the right side 0/5; left side 5 out of 5. Sensory: Responds to pain from all sites.     Coordination: Unable to assess. DTR: 2+ throughout, upgoing plantar on the right  Gait: not tested     Result Information      Status: Final result (Exam End: 8/19/2020 16:02)  Provider Status: Open    Study Result      EXAM: CT HEAD WO CONT     CLINICAL INDICATION/HISTORY: question CVA, no movement right side, down x 4 days     TECHNIQUE: Contiguous axial images were obtained through the brain.  From these,  sagittal and coronal reconstructions were generated.     CT scans at this facility are performed using dose optimization technique as  appropriate with performed exam, to include automated exposure control,  adjustment of mA and/or kV according to patient's size (including appropriate  matching for site-specific examinations), or use of iterative reconstruction  technique.     COMPARISON: None     FINDINGS:           Soft tissues: No significant findings.     Skull base/calvarium: Unremarkable.     Brain: There is a nonhemorrhagic acute to subacute infarction on the left. In  the axial plane involving an area of about 5 x 5 cm. There is mass effect on the  left lateral ventricle there is mild 3 to 4 mm midline shift.     No evidence of hemorrhagic conversion is seen.     There is a tiny mary of calcific density in the medial and the tiny increased  density in the posterior margins of the infarcted parenchyma suggesting  possibility of some distal calcific embolus see image 12 series 2. Some of the  density may represent stagnant or thrombosed blood within MCA territory branch  arteries.     This appears to be involving the cortex as well as the left basal ganglia  thalamic region and globus pallidus. Putamen is involved along its posterior  aspect.   There is evidence for some indeterminate probably old infarction in the right  anterior basal ganglia and thalamus seen on image 13 series 2  Ventricles and cisterns: No intraluminal bleed is seen     Orbits: Unremarkable.      Paranasal Sinuses: Clear      Other findings: None     IMPRESSION  IMPRESSION[de-identified]     Acute/subacute large nonhemorrhagic left MCA territory infarction as described     Small calcific density in the medial portion may represent some calcified  embolic material. Densities along the posterior aspect of the infarcted area may  represent some thrombosed vessels.      Result Information      Status: Final result (Exam End: 8/19/2020 17:02)  Provider Status: Open    Study Result      EXAM: CTA HEAD NECK W WO CONT     CLINICAL INDICATIONS: Eval for LVO     TECHNIQUE: Helical CT scan of the brain and neck were performed at 1.25 mm  intervals during rapid IV bolus contrast administration.  These data were  reconstructed at .625 mm intervals for vascular analysis.  The data was also  reviewed at 2.5 mm intervals for accompanying soft tissue analysis. 3D post  processed images, including surface shaded displays, were produced for this exam  on independent console, permanently archived and interpreted.     All CT scans at this facility are performed using dose optimization technique as  appropriate to a performed exam, to include automated exposure control,  adjustment of the MA and/or kV according to patient size (including appropriate  matching for site-specific examinations) or use of  iterative reconstruction  technique.     CONTRAST: Isovue 370     COMPARISON: None     CTA SOFT TISSUE ANALYSIS:  Lungs: Clear infiltrate on the right side moderate burden q.d. pulmonary embolus  identified in the vessels on the left side. Upper chest: Unremarkable  Neck: Unremarkable  Lymph nodes: No adenopathy  Orbits: Unremarkable  Paranasal sinuses: Clear  Brain: Evidence for a moderate to large left MCA territory infarction. The  distal branches appear to have recannulized. There is no evidence of any  proximal vessel cut off to the infarcted area on the left.   Bones: Unremarkable for age.     CTA NECK VASCULAR ANALYSIS:      Aortic arch: Left sided with typical configuration.     Innominate: Patent     Right Subclavian: Patent  Left Subclavian: Patent     Right carotid:  -CCA: Patent  -ECA: Patent  -ICA: Patent. Estimated % stenosis of proximal ICA by NASCET criteria.     Left carotid:  -CCA: Patent  -ECA: Patent  -ICA: Patent Estimated % stenosis of proximal ICA by NASCET criteria.     Right vertebral: Patent     Left vertebral: Patent        CTA BRAIN VASCULAR ANALYSIS:      Right anterior circulation:  -ICA: Patent  -JEFFERY: Patent   -MCA: Patent     Left anterior circulation:  -ICA: Patent  -JEFFERY: Patent   -MCA: Patent     -ACOM: Unremarkable  -PCOM: Patent     Posterior circulation:  -RVA: Patent  -LVA: Patent  -Basilar: Patent  -Right PCA: Patent  -Left PCA: Patent     Major dural venous sinuses: Unremarkable     Other:     There is evidence for moderate burden PE on the left side with extension  secondary and tertiary branches into the upper lobe. The entire lung is not  included here. The main pulmonary artery and right pulmonary artery are. These  are free of any additional defects.        IMPRESSION  Impression:     1. Patent intra- and extracranial brain arteries. Vessels surrounding the  infarcted area in the left frontoparietal region have recannulized.  No evidence  of any proximal vessel cut off to the infarcted region is identified in the  proximal M1 and M2 segments     Evidence for at least left-sided moderate sized acute pulmonary embolism.               CBC:   Lab Results   Component Value Date/Time    WBC 19.3 (H) 08/21/2020 06:34 AM    RBC 5.13 08/21/2020 06:34 AM    HGB 14.4 08/21/2020 06:34 AM    HCT 44.9 08/21/2020 06:34 AM    PLATELET 513 16/38/8305 06:34 AM     BMP:   Lab Results   Component Value Date/Time    Glucose 141 (H) 08/21/2020 06:34 AM    Sodium 143 08/21/2020 06:34 AM    Potassium 3.7 08/21/2020 06:34 AM    Chloride 111 08/21/2020 06:34 AM    CO2 26 08/21/2020 06:34 AM    BUN 21 (H) 08/21/2020 06:34 AM    Creatinine 0.93 08/21/2020 06:34 AM Calcium 8.2 (L) 08/21/2020 06:34 AM     CMP:   Lab Results   Component Value Date/Time    Glucose 141 (H) 08/21/2020 06:34 AM    Sodium 143 08/21/2020 06:34 AM    Potassium 3.7 08/21/2020 06:34 AM    Chloride 111 08/21/2020 06:34 AM    CO2 26 08/21/2020 06:34 AM    BUN 21 (H) 08/21/2020 06:34 AM    Creatinine 0.93 08/21/2020 06:34 AM    Calcium 8.2 (L) 08/21/2020 06:34 AM    Anion gap 6 08/21/2020 06:34 AM    BUN/Creatinine ratio 23 (H) 08/21/2020 06:34 AM    Alk. phosphatase 117 08/19/2020 03:37 PM    Protein, total 7.8 08/19/2020 03:37 PM    Albumin 3.5 08/19/2020 03:37 PM    Globulin 4.3 (H) 08/19/2020 03:37 PM    A-G Ratio 0.8 08/19/2020 03:37 PM     Coagulation:   Lab Results   Component Value Date/Time    Prothrombin time 15.3 (H) 08/19/2020 03:37 PM    INR 1.2 08/19/2020 03:37 PM    aPTT 90.4 (H) 08/21/2020 06:34 AM       Impression:      A 60years old female patient was brought to the emergency room after she was found down at her home of unknown duration [she was last seen about 4 days ago]. She has global aphasia with right-sided body weakness. Otherwise she is awake. No witnessed seizure. CT scan of the brain showed a large left MCA territory stroke with no hemorrhage. No previous history of stroke on the chart. No atrial fibrillation on telemetry. CT angiography of the head and neck was unremarkable. Incidental finding of left side pulmonary embolism. Doppler ultrasound of the lower extremities showed occlusive thrombus of the right proximal and distal deep veins. Patient had an inferior vena cava filter. She is on heparin drip with no bolusing.     Plan:  -Continuous telemetry/up with assistance  -Vitals/Neurochecks q4hrs  -MRI Brain w/o contrast  -TTE  -Follow the CTA of chest  -Management of pulmonary embolism: s/p IVC filter. On heparin drip currently;  anticoagulation in this patient with large MCA territory acute infarction is difficult. At high risk of hemorrhagic conversion. -Since the patient has IVC filter, can delay anticoagulation for 1-2 weeks unless she is hemodynamically unstable. -Need to follow the for clinical worsening and serial CT scans.  -Permissive HTN, PRN Hydralazine 25mg PO for SBP > 200  -Start on ASA 81mg Qdaily and Atorvastatin 80mg Qdaily  -PT/OT/ST eval: follow recommendations. Sincerely,      Bria Salcido M.D. PLEASE NOTE:   This document has been produced using voice recognition software. Unrecognized errors in transcription may be present.

## 2020-08-21 NOTE — CONSULTS
Comprehensive Nutrition Assessment    Type and Reason for Visit: Initial, Consult, Positive nutrition screen(Management of Tube Feeding)    Nutrition Recommendations/Plan:   - Once NGT placement confirmed via KUB, start tube feeding of Jevity 1.5 at 20 mL/hr and advance as tolerated by 10 mL q 8 hours to goal rate of 50 mL/hr with 150 mL q 4 hour water flushes.   - IVF per MD. Consider modifying as appropriate once tolerating tube feeding pending fluid balance. Nutrition Assessment:  NPO and receiving IVF with dextrose/potassium and po medications being held (ascorbic acid, zinc and colace prn). Malnutrition Assessment:  Malnutrition Status: At risk for malnutrition (specify)(unable to tolerate oral diet due to dysphagia, altered mentation/confusion)      Nutrition History and Allergies: PMH of HTN who presents with significant new onset weakness to right side of body found to have CVA, PE, DVT s/p IVC filter placement, with cellulitis and pneumonia- concern for aspiration and remains NPO per SLP s/p swallow evaluation on 8/21/20. Last known to be in normal state of health on 8/17/20 per sister. Routinely receives paracentesis with presence of ascites noted.  NKFA     Estimated Daily Nutrient Needs:  Energy (kcal):  1843-4603  Protein (g):  58-86       Fluid (ml/day):  5744-5544    Nutrition Related Findings:  Last BM PTA, ascites; NPO with altered mentation      Wounds:  Surgical wound(Groin incision)       Additional Caloric Sources: D5 + 20 mEq/L KCl at 75 mL/hr (90 gm dextrose, 306 kcal, 36 mEq KCl per day)     Current Nutrition Therapies:   DIET NPO    Recommended Tube Feeding (TF) Regimen, plan to start:   · Feeding Route: Nasogastric  · Formula: Jevity 1.5  · Schedule:Continuous    · Regimen: start at 20 mL/hr and advance by 10 mL q 8 hours to 50 mL/hr  · Additives/Modulars:    · Water Flushes: 150 mL q 4 hours (900 mL)  · Current TF & Flush Orders Provides: not applicable  · Goal TF & Flush Orders Provides: 1800 kcal, 77 gm protein, 912 mL free water, 100% RDIs      Anthropometric Measures:  · Height:  5' 3\" (160 cm)  · Current Body Wt:  72 kg (158 lb 11.2 oz)   · Admission Body Wt:  137 lb    · Usual Body Wt:  149 lb 14.6 oz(3/31/19)     · Ideal Body Wt:  115 lbs:  138 %   · BMI Category: Overweight (BMI 25.0-29. 9)       Nutrition Diagnosis:   · Inadequate oral intake related to cognitive or neurological impairment, swallowing difficulty as evidenced by NPO or clear liquid status due to medical condition, swallowing study results    Nutrition Interventions:   Food and/or Nutrient Delivery: Continue NPO, Start tube feeding, IV fluid delivery  Nutrition Education and Counseling: No recommendations at this time  Coordination of Nutrition Care: Continued inpatient monitoring, Speech therapy(obtained order for management of tube feeding from MD)    Goals:  Patient will tolerate enteral nutrition formula and regimen without difficulty within the next 7 days. Nutrition Monitoring and Evaluation:   Food/Nutrient Intake Outcomes: Enteral nutrition intake/tolerance, Vitamin/mineral intake, IVF intake  Physical Signs/Symptoms Outcomes: Biochemical data, Chewing or swallowing, GI status, Fluid status or edema, Nutrition focused physical findings    Discharge Planning:     Too soon to determine     Electronically signed by Ariel Isidro RD, [address]  on 8/21/2020 at 1:41 PM    Contact: [phone]

## 2020-08-21 NOTE — PERIOP NOTES
TRANSFER - OUT REPORT:    Verbal report given to iTaggit on Malick Chapman  being transferred to [facility] for routine post - op       Report consisted of patients Situation, Background, Assessment and   Recommendations(SBAR). Information from the following report(s) SBAR, OR Summary, Procedure Summary, Intake/Output, MAR and Recent Results was reviewed with the receiving nurse. Lines:   Peripheral IV 08/19/20 Right Hand (Active)   Site Assessment Clean, dry, & intact 08/20/20 1914   Phlebitis Assessment 0 08/20/20 1914   Infiltration Assessment 0 08/20/20 1914   Dressing Status Clean, dry, & intact 08/20/20 1914   Dressing Type Transparent 08/20/20 1914   Hub Color/Line Status Pink; Infusing;Patent 08/20/20 1914       Peripheral IV 08/19/20 Left Antecubital (Active)   Site Assessment Clean, dry, & intact 08/20/20 1914   Phlebitis Assessment 0 08/20/20 1914   Infiltration Assessment 0 08/20/20 1914   Dressing Status Clean, dry, & intact 08/20/20 1914   Dressing Type Transparent 08/20/20 1914   Hub Color/Line Status Pink;Capped 08/20/20 1914       Peripheral IV 08/19/20 Right Antecubital (Active)   Site Assessment Clean, dry, & intact 08/20/20 1914   Phlebitis Assessment 0 08/20/20 1914   Infiltration Assessment 0 08/20/20 1914   Dressing Status Clean, dry, & intact 08/20/20 1914   Dressing Type Transparent 08/20/20 1914   Hub Color/Line Status Pink; Infusing;Patent 08/20/20 1914        Opportunity for questions and clarification was provided.       Patient transported with:   Registered Nurse

## 2020-08-21 NOTE — PALLIATIVE CARE
Full note to follow       Patient seen this am. She was a bit hard to arouse this am, but after time opened her eyes. No verbalization for me. Called her sister April: patient is , no children has total of 3 siblings. Her mother Sherine and father Trena Hernadez are still living. There is no AMD. Her legal next of kin are her parents. They are her medical decision makers. April is getting their numbers for us and will call back. Goals of care not discussed with sister. Await parents contact numbers.        Mother Sherine [phone]    Father Edy Vicente [phone]

## 2020-08-21 NOTE — PROGRESS NOTES
Infectious Disease progress Note        Reason: Acute pulmonary embolism, suspected COVID-19 infection    Current abx Prior abx   Azithromycin since 8/19-8/20  Zosyn since 8/20 Piperacillin/tazobactam 8/19     Lines:       Assessment :     60 y.o.  female  with past medical history significant for hypertension who presented to ED on 8/19/2020 with significant new onset weakness to right side of body. CT head: Acute/subacute large nonhemorrhagic left MCA territory infarction. CTA head and neck 8/19-moderate-sized pulmonary embolism. CXR 8/19- RML infiltrate    Now with worsening leukocytosis. Clinical presentation consistent with aspiration pneumonia in a patient with acute pulmonary embolism, CVA. Worsening leukocytosis likely secondary to aspiration pneumonia along with leukemoid reaction to acute pulmonary embolism. Negative rapid covid test 8/20/2020    Recommendations:    1. continue piperacillin/tazobactam.    2.  F/u [facility] COVID test  3. Needs frequent suctioning-discussed with nursing  4. Management of pulmonary embolism per primary team  5. Management of CVA per neurology  6. Follow-up blood cultures  7. Ok  to discontinue droplet plus isolation if [facility] covid test negative        Above plan was discussed in details with patient, RN and dr Britta Shoemaker. Please call me if any further questions or concerns. Will continue to participate in the care of this patient. HPI:     Does not communicate. Detailed review of system not feasible    History reviewed. No pertinent surgical history. Home Medication List      Does not communicate. Detailed review of system not feasible Details   ondansetron hcl (ZOFRAN) 4 mg tablet Take 2 Tabs by mouth every eight (8) hours as needed for Nausea.   Qty: 12 Tab, Refills: 0             Current Facility-Administered Medications   Medication Dose Route Frequency    dextrose 5% with KCl 20 mEq/L infusion   IntraVENous CONTINUOUS  heparin (porcine) 25,000 units in 0.45% saline 250 ml infusion  18-36 Units/kg/hr IntraVENous TITRATE    piperacillin-tazobactam (ZOSYN) 3.375 g in 0.9% sodium chloride (MBP/ADV) 100 mL MBP  3.375 g IntraVENous Q6H    albuterol-ipratropium (DUO-NEB) 2.5 MG-0.5 MG/3 ML  3 mL Nebulization Q4H RT    aspirin (ASA) suppository 300 mg  300 mg Rectal DAILY    azithromycin (ZITHROMAX) 500 mg in  mL  500 mg IntraVENous Q24H    sodium chloride (NS) flush 5-40 mL  5-40 mL IntraVENous Q8H    sodium chloride (NS) flush 5-40 mL  5-40 mL IntraVENous PRN    docusate sodium (COLACE) capsule 100 mg  100 mg Oral BID PRN    acetaminophen (TYLENOL) suppository 650 mg  650 mg Rectal Q4H PRN    labetaloL (NORMODYNE;TRANDATE) 20 mg/4 mL (5 mg/mL) injection 5 mg  5 mg IntraVENous Q10MIN PRN    zinc sulfate (ZINCATE) 220 (50) mg capsule 1 Cap  1 Cap Oral DAILY    melatonin tablet 3 mg  3 mg Oral QHS    ascorbic acid (vitamin C) (VITAMIN C) tablet 500 mg  500 mg Oral DAILY    atorvastatin (LIPITOR) tablet 80 mg  80 mg Oral QHS       Allergies: Benadryl [diphenhydramine hcl]    Temp (24hrs), Av °F (36.7 °C), Min:97.4 °F (36.3 °C), Max:98.7 °F (37.1 °C)    Visit Vitals  /83 (BP 1 Location: Left arm, BP Patient Position: At rest)   Pulse 80   Temp 97.4 °F (36.3 °C)   Resp 30   Ht 5' 3\" (1.6 m)   Wt 72 kg (158 lb 11.2 oz)   SpO2 98%   BMI 28.11 kg/m²       ROS: 12 point ROS obtained in details. Pertinent positives as mentioned in HPI,   otherwise negative    Physical Exam:    General:  Alert, cooperative, no acute distress    Pulmonary: wet sounds from upper airway, no wheezing  Cardiovascular: Regular rate and Rhythm. GI:  Soft, Non distended, Non tender. + Bowel sounds. Extremities:  No edema. Psych: Good insight. Not anxious or agitated.   Neurologic: Alert awake, a phasic, motor strength left side 5/5, motor strength right side 0/5    Labs: Results:   Chemistry Recent Labs     20  8534 20  8464 08/19/20  1537   * 99 113*    150* 147*   K 3.7 3.0* 3.6    113* 114*   CO2 26 27 23   BUN 21* 16 17   CREA 0.93 0.75 0.94   CA 8.2* 8.5 8.9   AGAP 6 10 10   BUCR 23* 21* 18   AP  --   --  117   TP  --   --  7.8   ALB  --   --  3.5   GLOB  --   --  4.3*   AGRAT  --   --  0.8      CBC w/Diff Recent Labs     08/21/20  0634 08/20/20  0812 08/19/20  1537   WBC 19.3* 18.1* 13.2   RBC 5.13 5.61* 5.47*   HGB 14.4 15.9 15.5   HCT 44.9 48.7* 47.2*    193 237   GRANS PENDING 88* 77*   LYMPH PENDING 7* 12*   EOS PENDING 0 0      Microbiology Recent Labs     08/19/20  2100 08/19/20  2040   CULT NO GROWTH 1 DAY NO GROWTH 1 DAY          RADIOLOGY:    All available imaging studies/reports in [facility] [place] for this admission were reviewed    Dr. Louie Wilson, Infectious Disease Specialist  [phone]  August 21, 2020  2:28 PM

## 2020-08-21 NOTE — PROGRESS NOTES
Problem: Mobility Impaired (Adult and Pediatric)  Goal: *Therapy Goal (Edit Goal, Insert Text)  Description: Physical Therapy Goals  Initiated 8/20/2020 and to be accomplished within 7 day(s)  1. Patient will roll side to side in bed with minimal assistance. 2.  Patient will  move from supine to sit and sit to supine with minimal assistance. 2.  Patient will transfer from bed to chair and chair to bed with moderate assistance  using the least restrictive device. 3.  Patient will perform sit to stand with moderate assistance . 4. Patient will sit EOB for 10 minutes with moderate assistance. 4.  Patient will ambulate with moderate assistance  for 25 feet with the least restrictive device. PLOF: Pt non-verbal, limited history. Per chart review, patient was living alone. Outcome: Progressing Towards Goal     PHYSICAL THERAPY TREATMENT    Patient: Brittany Fry (24 y.o. female)  Date: 8/21/2020  Diagnosis: Cellulitis [L03.90]  Cerebrovascular accident (CVA) ([address] [I63.9]  Elevated troponin I level [R79.89]  Cerebrovascular accident (CVA) ([address] [I63.9]  CVA (cerebral vascular accident) ([address] [I63.9]   <principal problem not specified>  Procedure(s) (LRB):  VENA CAVA FILTER INSERTION (Right) 1 Day Post-Op  Precautions: Fall, Contact, Skin    ASSESSMENT:  Nurse cleared patient to participate in PT session. Patient received supine with HOB elevated and eyes open. /70 and SPO2 ranging from 88 to 96% on RA. Patient is drowsy and lethargic, nods no to pain. She has poor command following, spontaneous movement of left UE observed, but not of left LE. Patient with left gaze, but able to track past midline to the right. Left LE flaccid with low tone. PROM performed of BLE's. Patient required max cues throughout session to open eyes.      Progression toward goals:   []      Improving appropriately and progressing toward goals  [x]      Improving slowly and progressing toward goals  []      Not making progress toward goals and plan of care will be adjusted     PLAN:  Patient continues to benefit from skilled intervention to address the above impairments. Continue treatment per established plan of care. Discharge Recommendations:  Rehab  Further Equipment Recommendations for Discharge:  defer to facility     SUBJECTIVE:   Patient non-verbal.    OBJECTIVE DATA SUMMARY:   Critical Behavior:  Neurologic State: Alert, Eyes open spontaneously  Orientation Level: Unable to verbalize  Cognition: Unable to assess (comment)(inconsistent command following)  Safety/Judgement: Fall prevention  Functional Mobility Training:     Range Of Motion:   AROM: Grossly decreased, non-functional   PROM: Within functional limits           Therapeutic Exercises:         EXERCISE   Sets   Reps   Active Active Assist   Passive Self ROM   Comments   Ankle Pumps 1 10  [] [] [x] []    Quad Sets/Glut Sets    [] [] [] [] Hold for 5 secs   Hamstring Sets   [] [] [] []    Short Arc Quads 1 10 [] [] [x] []    Heel Slides 1 15 [] [] [x] []    Straight Leg Raises   [] [] [] []    Hip Add/abduction 1 10 [] [] [x] []    Long Arc Quads   [] [] [] []    Seated Marching   [] [] [] []    Standing Marching   [] [] [] []       [] [] [] []        Pain:  Pain level pre-treatment: 0/10 pt nods no  Pain level post-treatment: 0/10   Pain Intervention(s): Medication (see MAR); Rest, Ice, Repositioning   Response to intervention: Nurse notified, See doc flow    Activity Tolerance:   limited  Please refer to the flowsheet for vital signs taken during this treatment. After treatment:   [] Patient left in no apparent distress sitting up in chair  [x] Patient left in no apparent distress in bed  [x] Call bell left within reach  [x] Nursing notified  [] Caregiver present  [] Bed alarm activated  [] SCDs applied      COMMUNICATION/EDUCATION:   []         Role of Physical Therapy in the acute care setting.   []         Fall prevention education was provided and the patient/caregiver indicated understanding. [x]         Patient/family have participated as able in working toward goals and plan of care. []         Patient/family agree to work toward stated goals and plan of care. []         Patient understands intent and goals of therapy, but is neutral about his/her participation.   []         Patient is unable to participate in stated goals/plan of care: ongoing with therapy staff.  []         Other:        Abelardo Whittaker, PT   Time Calculation: 15 mins

## 2020-08-21 NOTE — ACP (ADVANCE CARE PLANNING)
Palliative Medicine    Pt does not have an Advance Directive on file in EMR. Health care decision maker would  fall to her next of kin, her parents Louann Smith and [place] ([address]): Jennifer    Relationship to patient: father  Phone number: [phone] work    Legal Next of [address] ([address]): [address]   Relationship to patient: Mother   Phone number:[phone]  Legal Next of Kin      ACP documents you current have include:  Advance Directive or Living Will  Durable Do Not Resuscitate  Physician Orders for Scope of Treatment (POST)  Medical Power of   Other - NONE    Palliative team was able to reach both parents by phone to provide update on patients current condition. Palliative team will continue to follow to continue to define goals of care. Thank you for the Palliative Medicine consult and allowing us to participate in the care of [facility]. Will continue to monitor and provide support.     Chetna CASTILLO, RN, [facility]  Palliative Medicine Inpatient   DR. SHELBY \[facility]\""   Palliative COP[facility] Line: [phone] ([phone])

## 2020-08-21 NOTE — PROGRESS NOTES
SUBJECTIVE:     Patient is awake. It is hard to understand her speech. Denies for having any pain by nodding her head. No new issues per nursing.     OBJECTIVE:     /83 (BP 1 Location: Left arm, BP Patient Position: At rest)   Pulse 85   Temp 97.4 °F (36.3 °C)   Resp 30   Ht 5' 3\" (1.6 m)   Wt 72 kg (158 lb 11.2 oz)   SpO2 98%   BMI 28.11 kg/m²      General appearance -awake, NAD. Eyes - sclera anicteric, no pallor  Nose - no obvious nasal discharge. Neck - supple, no JVD, trachea is midline  Chest -diminished air entry noted in bases, poor inspiratory efforts, no wheezes  Heart - S1 and S2 normal  Abdomen - soft, nontender, nondistended, Bowel sounds present  Neurological -awake, follows commands, motor strength 0 out of 5 in right upper extremity and right lower extremity and 5 out of 5 in the left upper extremity and 4-5 out of 5 in left lower extremity  Musculoskeletal - no joint tenderness or swelling of knees bilaterally  Extremities - no pedal edema noted     ASSESSMENT:     1. Right-sided weakness due to left CVA  2. Aspiration pneumonia  3. Dysphagia due to stroke  4. Left-sided PE and bilateral lower extremity DVT status post IVC filter  5. Hypertension  6. Elevated inflammatory biomarkers due to stroke  7. Indeterminate elevation of troponin due to demand ischemia  8. Elevated BNP, no overt CHF  9. Leukocytosis     PLAN:     Continue current management  CTA chest report reviewed  Advised nurses to put NG tube and check stat KUB after putting NG tube to confirm the location  CT head will be done tomorrow and will continue heparin drip at this point  Spoke to patient's sister over the phone    Disclaimer: Sections of this note are dictated using utilizing voice recognition software, which may have resulted in some phonetic based errors in grammar and contents.  Even though attempts were made to correct all the mistakes, some may have been missed, and remained in the body of the document. If questions arise, please contact our department. Recent Results (from the past 24 hour(s))   PTT    Collection Time: 08/20/20  9:17 PM   Result Value Ref Range    aPTT 69.8 (H) 23.0 - 36.4 SEC   D DIMER    Collection Time: 08/21/20  6:34 AM   Result Value Ref Range    D DIMER 5.72 (H) <0.46 ug/ml(FEU)   FERRITIN    Collection Time: 08/21/20  6:34 AM   Result Value Ref Range    Ferritin 225 8 - 388 NG/ML   CK    Collection Time: 08/21/20  6:34 AM   Result Value Ref Range     (H) 26 - 192 U/L   C REACTIVE PROTEIN, QT    Collection Time: 08/21/20  6:34 AM   Result Value Ref Range    C-Reactive protein 19.9 (H) 0 - 0.3 mg/dL   PROCALCITONIN    Collection Time: 08/21/20  6:34 AM   Result Value Ref Range    Procalcitonin 0.41 ng/mL   LD    Collection Time: 08/21/20  6:34 AM   Result Value Ref Range     (H) 81 - 234 U/L   CBC WITH AUTOMATED DIFF    Collection Time: 08/21/20  6:34 AM   Result Value Ref Range    WBC 19.3 (H) 4.6 - 13.2 K/uL    RBC 5.13 4.20 - 5.30 M/uL    HGB 14.4 12.0 - 16.0 g/dL    HCT 44.9 35.0 - 45.0 %    MCV 87.5 74.0 - 97.0 FL    MCH 28.1 24.0 - 34.0 PG    MCHC 32.1 31.0 - 37.0 g/dL    RDW 15.8 (H) 11.6 - 14.5 %    PLATELET 875 978 - 620 K/uL    MPV 12.1 (H) 9.2 - 11.8 FL    NEUTROPHILS 82 (H) 42 - 75 %    LYMPHOCYTES 6 (L) 20 - 51 %    MONOCYTES 12 (H) 2 - 9 %    EOSINOPHILS 0 0 - 5 %    BASOPHILS 0 0 - 3 %    ABS. NEUTROPHILS 15.8 (H) 1.8 - 8.0 K/UL    ABS. LYMPHOCYTES 1.2 0.8 - 3.5 K/UL    ABS. MONOCYTES 2.3 (H) 0 - 1.0 K/UL    ABS. EOSINOPHILS 0.0 0.0 - 0.4 K/UL    ABS.  BASOPHILS 0.0 0.0 - 0.06 K/UL    DF MANUAL      PLATELET COMMENTS ADEQUATE PLATELETS      RBC COMMENTS ANISOCYTOSIS  1+       METABOLIC PANEL, BASIC    Collection Time: 08/21/20  6:34 AM   Result Value Ref Range    Sodium 143 136 - 145 mmol/L    Potassium 3.7 3.5 - 5.5 mmol/L    Chloride 111 100 - 111 mmol/L    CO2 26 21 - 32 mmol/L    Anion gap 6 3.0 - 18 mmol/L    Glucose 141 (H) 74 - 99 mg/dL    BUN 21 (H) 7.0 - 18 MG/DL    Creatinine 0.93 0.6 - 1.3 MG/DL    BUN/Creatinine ratio 23 (H) 12 - 20      GFR est AA >60 >60 ml/min/1.73m2    GFR est non-AA >60 >60 ml/min/1.73m2    Calcium 8.2 (L) 8.5 - 10.1 MG/DL   MAGNESIUM    Collection Time: 08/21/20  6:34 AM   Result Value Ref Range    Magnesium 2.2 1.6 - 2.6 mg/dL   PTT    Collection Time: 08/21/20  6:34 AM   Result Value Ref Range    aPTT 90.4 (H) 23.0 - 36.4 SEC     CT Results  (Last 48 hours)               08/21/20 1518  CTA CHEST W OR W WO CONT Final result    Impression:  IMPRESSION:   1. Study is positive for bilateral upper lobe pulmonary emboli as discussed,   with suspect distal subsegmental right lower lobe and questionable anterior left   lower lobe filling defects as above. -Positive findings known from CT neck previous day. 2. Mild reflux of contrast into the hepatic veins and IVC, but no other definite   findings of significant heart strain. 3. Multifocal areas of groundglass to peripheral consolidative airspace   infiltrates could reflect acute inflammation or pneumonia, but may also   represent manifestation of developing pulmonary infarct given distribution. 4. Cardiomegaly without effusion. Nonenlarged aorta. Narrative:  CT CHEST PULMONARY EMBOLISM PROTOCOL       CPT code: 00212       INDICATION: Altered mental status. Hypertension. Pulmonary embolism noted on   head and neck CT August 19. TECHNIQUE: 2.5mm collimation helcial images obtained through the level of the   pulmonary arteries with additional imaging through the chest following the   uneventful administration of 100 cc's nonionic intravenous contrast.    - Images reconstructed into three dimensional coronal and sagittal projections   for complete evaluation of the tortuous and overlapping pulmonary vascular   structures and to reduce patient radiation dose.         All CT scans at this facility are performed using dose optimization technique as appropriate to this specific exam, to include automated exposure control,   adjustment of the mA and/or KP according to patient size or use of iterative   reconstruction techniques. COMPARISON: Head and neck CT 8/19/2020       FINDINGS:   Opacification of the pulmonary arteries is adequate. Redemonstrated is a linear nonocclusive filling defects within the left upper   lobe anterior lobe subsegmental branches image 96 and also more central image   93. Linear nonocclusive filling defect present within the anterior right upper lobe   subsegmental branch image 71 with subsegmental filling defect also likely in   branch of the apical segment. Previous occlusive filling defect in the posterior right upper lobe segmental   branch remains. Questionable distal subsegmental right lower lobe posterior basal to lateral   basal segment filling defect as well. Possible subsegmental filling defect in   the anterior to lateral basal segment left lower lobe though significantly   motion degraded. Geographic region of central to peripheral groundglass infiltrates with   subpleural more dense consolidation involving the posterior and dorsal apical   segment of the right upper lobe similar to the previous exam suggests evolving   infarct. Small volume similar-appearing peripheral wedge-shaped opacity in the   posterior lateral segment right middle lobe and several areas of   similar-appearing groundglass infiltrate in the posterior and lateral segments   of the right lower lobe. Small wedge-shaped opacity in the posterior medial segment left lower lobe. Subsegmental curvilinear atelectasis in the inferior left upper lobe. Heart size is mildly enlarged, left ventricular predominance. No pericardial effusion. No significant septal deviation appreciated. Reflux of intravenous contrast into the IVC and minimally into the hepatic   veins. Main pulmonary artery is not enlarged by size criteria. 08/19/20 1722  CT PERF W CBF Final result    Impression:  IMPRESSION:       1. Area of hypoattenuation consistent with acute infarct in the left anterior   lateral frontal lobe and adjacent lateral basal ganglia has perfusion data   consistent with a completed infarct. 2. Some increased CBV perfusion at the ventral and dorsal margins of the   completed infarct suggesting additional areas at risk for ischemia. Narrative:  CT Head  Contrast For Evaluation Of Cerebral Perfusion. CPT CODE: 88602       HISTORY: CVA. COMPARISON: No prior CT perfusion. Noncontrast CT head, CTA head and neck   performed concurrently. TECHNIQUE:  A non-contrast CT of the head was performed from the skull base to   the vertex. CT perfusion study was performed. 40 cc of iodinated contrast was   injected IV at 4 - 5 cc/sec. Helical cine images were acquired. FINDINGS: On the noncontrast head CT performed immediately prior there is an   area of abnormal hypoattenuation within the left lateral frontal lobe and   lateral basal ganglia. Involves the insular cortex. CT Perfusion: The data was post-processed using standard perfusion software. Perfusion maps for cerebral blood flow (CBF), cerebral blood volume (CBV), and   mean transit time (MTT) were generated. These maps demonstrate decreased CBF,   decreased CBV, and prolonged MTT in the central region of the hypoattenuation on   head CT. Findings are suggestive of a completed infarct. Cortex directly   lateral to the hypoattenuating focus has decreased CBF, hypoattenuation on CT   data, and prolonged MTT. The CBV in this cortex is normal to slightly decreased. There is some increased CBV and CBF with prolonged MTT in the left frontal lobe   cortex ventral and dorsal to the area of hypoattenuation.        Normal perfusion suggested in the right cerebrum.

## 2020-08-21 NOTE — PROGRESS NOTES
Spoke w/ STEFAN Rodriguez @[id number]  PT Is not stable currently enough now tolerate the MRI.     STEFAN Rodriguez will inform the provider

## 2020-08-22 ENCOUNTER — APPOINTMENT (OUTPATIENT)
Dept: CT IMAGING | Age: 65
DRG: 040 | End: 2020-08-22
Attending: INTERNAL MEDICINE
Payer: COMMERCIAL

## 2020-08-22 ENCOUNTER — APPOINTMENT (OUTPATIENT)
Dept: NON INVASIVE DIAGNOSTICS | Age: 65
DRG: 040 | End: 2020-08-22
Attending: HOSPITALIST
Payer: COMMERCIAL

## 2020-08-22 ENCOUNTER — APPOINTMENT (OUTPATIENT)
Dept: GENERAL RADIOLOGY | Age: 65
DRG: 040 | End: 2020-08-22
Attending: INTERNAL MEDICINE
Payer: COMMERCIAL

## 2020-08-22 LAB
ANION GAP SERPL CALC-SCNC: 5 MMOL/L (ref 3–18)
APTT PPP: 40.7 SEC (ref 23–36.4)
APTT PPP: 67.9 SEC (ref 23–36.4)
BASOPHILS # BLD: 0 K/UL (ref 0–0.06)
BASOPHILS NFR BLD: 0 % (ref 0–3)
BUN SERPL-MCNC: 22 MG/DL (ref 7–18)
BUN/CREAT SERPL: 18 (ref 12–20)
CALCIUM SERPL-MCNC: 8.6 MG/DL (ref 8.5–10.1)
CHLORIDE SERPL-SCNC: 111 MMOL/L (ref 100–111)
CK SERPL-CCNC: 471 U/L (ref 26–192)
CO2 SERPL-SCNC: 29 MMOL/L (ref 21–32)
CREAT SERPL-MCNC: 1.2 MG/DL (ref 0.6–1.3)
CRP SERPL-MCNC: 26.1 MG/DL (ref 0–0.3)
D DIMER PPP FEU-MCNC: 5.76 UG/ML(FEU)
DIFFERENTIAL METHOD BLD: ABNORMAL
EOSINOPHIL # BLD: 0 K/UL (ref 0–0.4)
EOSINOPHIL NFR BLD: 0 % (ref 0–5)
ERYTHROCYTE [DISTWIDTH] IN BLOOD BY AUTOMATED COUNT: 15.6 % (ref 11.6–14.5)
FERRITIN SERPL-MCNC: 318 NG/ML (ref 8–388)
GLUCOSE SERPL-MCNC: 91 MG/DL (ref 74–99)
HCT VFR BLD AUTO: 42.1 % (ref 35–45)
HGB BLD-MCNC: 13.7 G/DL (ref 12–16)
LDH SERPL L TO P-CCNC: 409 U/L (ref 81–234)
LYMPHOCYTES # BLD: 2.5 K/UL (ref 0.8–3.5)
LYMPHOCYTES NFR BLD: 12 % (ref 20–51)
MCH RBC QN AUTO: 28.2 PG (ref 24–34)
MCHC RBC AUTO-ENTMCNC: 32.5 G/DL (ref 31–37)
MCV RBC AUTO: 86.8 FL (ref 74–97)
MONOCYTES # BLD: 2.3 K/UL (ref 0–1)
MONOCYTES NFR BLD: 11 % (ref 2–9)
NEUTS SEG # BLD: 15.7 K/UL (ref 1.8–8)
NEUTS SEG NFR BLD: 77 % (ref 42–75)
PLATELET # BLD AUTO: 197 K/UL (ref 135–420)
PLATELET COMMENTS,PCOM: ABNORMAL
PMV BLD AUTO: 12.1 FL (ref 9.2–11.8)
POTASSIUM SERPL-SCNC: 3.4 MMOL/L (ref 3.5–5.5)
PROCALCITONIN SERPL-MCNC: 0.68 NG/ML
RBC # BLD AUTO: 4.85 M/UL (ref 4.2–5.3)
RBC MORPH BLD: ABNORMAL
RBC MORPH BLD: ABNORMAL
SODIUM SERPL-SCNC: 145 MMOL/L (ref 136–145)
WBC # BLD AUTO: 20.5 K/UL (ref 4.6–13.2)

## 2020-08-22 PROCEDURE — 84145 PROCALCITONIN (PCT): CPT

## 2020-08-22 PROCEDURE — 82550 ASSAY OF CK (CPK): CPT

## 2020-08-22 PROCEDURE — 85730 THROMBOPLASTIN TIME PARTIAL: CPT

## 2020-08-22 PROCEDURE — 85379 FIBRIN DEGRADATION QUANT: CPT

## 2020-08-22 PROCEDURE — 74011250636 HC RX REV CODE- 250/636: Performed by: INTERNAL MEDICINE

## 2020-08-22 PROCEDURE — 83615 LACTATE (LD) (LDH) ENZYME: CPT

## 2020-08-22 PROCEDURE — 82728 ASSAY OF FERRITIN: CPT

## 2020-08-22 PROCEDURE — 80048 BASIC METABOLIC PNL TOTAL CA: CPT

## 2020-08-22 PROCEDURE — 85025 COMPLETE CBC W/AUTO DIFF WBC: CPT

## 2020-08-22 PROCEDURE — 74011250636 HC RX REV CODE- 250/636

## 2020-08-22 PROCEDURE — 74011250637 HC RX REV CODE- 250/637: Performed by: HOSPITALIST

## 2020-08-22 PROCEDURE — 74011250637 HC RX REV CODE- 250/637: Performed by: EMERGENCY MEDICINE

## 2020-08-22 PROCEDURE — 86140 C-REACTIVE PROTEIN: CPT

## 2020-08-22 PROCEDURE — 70450 CT HEAD/BRAIN W/O DYE: CPT

## 2020-08-22 PROCEDURE — 74011000258 HC RX REV CODE- 258: Performed by: INTERNAL MEDICINE

## 2020-08-22 PROCEDURE — 74018 RADEX ABDOMEN 1 VIEW: CPT

## 2020-08-22 PROCEDURE — 65270000029 HC RM PRIVATE

## 2020-08-22 PROCEDURE — 77030038269 HC DRN EXT URIN PURWCK BARD -A

## 2020-08-22 PROCEDURE — 36415 COLL VENOUS BLD VENIPUNCTURE: CPT

## 2020-08-22 RX ORDER — HEPARIN SODIUM 1000 [USP'U]/ML
80 INJECTION, SOLUTION INTRAVENOUS; SUBCUTANEOUS ONCE
Status: COMPLETED | OUTPATIENT
Start: 2020-08-22 | End: 2020-08-22

## 2020-08-22 RX ORDER — HEPARIN SODIUM 1000 [USP'U]/ML
40 INJECTION, SOLUTION INTRAVENOUS; SUBCUTANEOUS ONCE
Status: COMPLETED | OUTPATIENT
Start: 2020-08-22 | End: 2020-08-22

## 2020-08-22 RX ORDER — POTASSIUM CHLORIDE 7.45 MG/ML
10 INJECTION INTRAVENOUS ONCE
Status: COMPLETED | OUTPATIENT
Start: 2020-08-22 | End: 2020-08-22

## 2020-08-22 RX ORDER — HEPARIN SODIUM 1000 [USP'U]/ML
INJECTION, SOLUTION INTRAVENOUS; SUBCUTANEOUS
Status: COMPLETED
Start: 2020-08-22 | End: 2020-08-22

## 2020-08-22 RX ORDER — AMLODIPINE BESYLATE 5 MG/1
2.5 TABLET ORAL DAILY
Status: DISCONTINUED | OUTPATIENT
Start: 2020-08-23 | End: 2020-08-23

## 2020-08-22 RX ADMIN — HEPARIN SODIUM 10000 UNITS: 1000 INJECTION INTRAVENOUS; SUBCUTANEOUS at 18:20

## 2020-08-22 RX ADMIN — Medication 500 MG: at 16:40

## 2020-08-22 RX ADMIN — Medication 10 ML: at 21:30

## 2020-08-22 RX ADMIN — PIPERACILLIN SODIUM AND TAZOBACTAM SODIUM 3.38 G: 3; .375 INJECTION, POWDER, LYOPHILIZED, FOR SOLUTION INTRAVENOUS at 18:40

## 2020-08-22 RX ADMIN — Medication 10 ML: at 06:00

## 2020-08-22 RX ADMIN — POTASSIUM CHLORIDE 10 MEQ: 10 INJECTION, SOLUTION INTRAVENOUS at 16:39

## 2020-08-22 RX ADMIN — HEPARIN SODIUM 5760 UNITS: 1000 INJECTION INTRAVENOUS; SUBCUTANEOUS at 19:27

## 2020-08-22 RX ADMIN — ATORVASTATIN CALCIUM 80 MG: 40 TABLET, FILM COATED ORAL at 21:29

## 2020-08-22 RX ADMIN — HEPARIN SODIUM 26 UNITS/KG/HR: 10000 INJECTION, SOLUTION INTRAVENOUS at 19:03

## 2020-08-22 RX ADMIN — HEPARIN SODIUM 2880 UNITS: 1000 INJECTION, SOLUTION INTRAVENOUS; SUBCUTANEOUS at 06:55

## 2020-08-22 RX ADMIN — Medication 10 ML: at 15:00

## 2020-08-22 RX ADMIN — PIPERACILLIN SODIUM AND TAZOBACTAM SODIUM 3.38 G: 3; .375 INJECTION, POWDER, LYOPHILIZED, FOR SOLUTION INTRAVENOUS at 03:32

## 2020-08-22 RX ADMIN — PIPERACILLIN SODIUM AND TAZOBACTAM SODIUM 3.38 G: 3; .375 INJECTION, POWDER, LYOPHILIZED, FOR SOLUTION INTRAVENOUS at 11:42

## 2020-08-22 RX ADMIN — MELATONIN 3 MG: at 21:29

## 2020-08-22 RX ADMIN — ASPIRIN 300 MG: 600 SUPPOSITORY RECTAL at 23:17

## 2020-08-22 NOTE — PROGRESS NOTES
SUBJECTIVE:     Patient was seen in presence of nursing. Patient remains a phasic. Denies having pain by nodding her head. NG tube was placed by nursing.     OBJECTIVE:     BP (!) 151/98 (BP 1 Location: Left leg, BP Patient Position: At rest)   Pulse 82   Temp 97.1 °F (36.2 °C)   Resp 22   Ht 5' 3\" (1.6 m)   Wt 72 kg (158 lb 11.2 oz)   SpO2 95%   BMI 28.11 kg/m²      General appearance -awake, NAD. NG tube is in situ. Chest -diminished air entry noted in bases, poor inspiratory efforts, no wheezes  Heart - S1 and S2 normal  Abdomen - soft, nontender, nondistended, Bowel sounds present  Neurological -awake, motor strength 0 out of 5 in right upper extremity and right lower extremity and 5 out of 5 in the left upper extremity and 4-5 out of 5 in left lower extremity  Extremities - no pedal edema noted     ASSESSMENT:     1. Right-sided weakness due to left CVA  2. Aspiration pneumonia  3. Dysphagia due to stroke  4. Left-sided PE and bilateral lower extremity DVT status post IVC filter  5. Hypertension  6. Elevated inflammatory biomarkers due to stroke, negative for COVID-19  7. Indeterminate elevation of troponin due to demand ischemia  8. Elevated BNP, no overt CHF  9. Leukocytosis due to #2 and #4     PLAN:     Continue current management  CT head is negative for bleeding, pending echocardiogram  We will start patient on tube feeding through the NG tube  Spoke to patient's sister over the phone -I have talked to her about need for PEG tube next week if she does not get better. Sister verbalized understanding about it. Disclaimer: Sections of this note are dictated using utilizing voice recognition software, which may have resulted in some phonetic based errors in grammar and contents. Even though attempts were made to correct all the mistakes, some may have been missed, and remained in the body of the document. If questions arise, please contact our department.     Recent Results (from the past 24 hour(s))   D DIMER    Collection Time: 08/22/20  3:22 AM   Result Value Ref Range    D DIMER 5.76 (H) <0.46 ug/ml(FEU)   FERRITIN    Collection Time: 08/22/20  3:22 AM   Result Value Ref Range    Ferritin 318 8 - 388 NG/ML   CK    Collection Time: 08/22/20  3:22 AM   Result Value Ref Range     (H) 26 - 192 U/L   C REACTIVE PROTEIN, QT    Collection Time: 08/22/20  3:22 AM   Result Value Ref Range    C-Reactive protein 26.1 (H) 0 - 0.3 mg/dL   PROCALCITONIN    Collection Time: 08/22/20  3:22 AM   Result Value Ref Range    Procalcitonin 0.68 ng/mL   LD    Collection Time: 08/22/20  3:22 AM   Result Value Ref Range     (H) 81 - 234 U/L   PTT    Collection Time: 08/22/20  3:22 AM   Result Value Ref Range    aPTT 67.9 (H) 23.0 - 36.4 SEC   CBC WITH AUTOMATED DIFF    Collection Time: 08/22/20  3:22 AM   Result Value Ref Range    WBC 20.5 (H) 4.6 - 13.2 K/uL    RBC 4.85 4.20 - 5.30 M/uL    HGB 13.7 12.0 - 16.0 g/dL    HCT 42.1 35.0 - 45.0 %    MCV 86.8 74.0 - 97.0 FL    MCH 28.2 24.0 - 34.0 PG    MCHC 32.5 31.0 - 37.0 g/dL    RDW 15.6 (H) 11.6 - 14.5 %    PLATELET 708 924 - 491 K/uL    MPV 12.1 (H) 9.2 - 11.8 FL    NEUTROPHILS 77 (H) 42 - 75 %    LYMPHOCYTES 12 (L) 20 - 51 %    MONOCYTES 11 (H) 2 - 9 %    EOSINOPHILS 0 0 - 5 %    BASOPHILS 0 0 - 3 %    ABS. NEUTROPHILS 15.7 (H) 1.8 - 8.0 K/UL    ABS. LYMPHOCYTES 2.5 0.8 - 3.5 K/UL    ABS. MONOCYTES 2.3 (H) 0 - 1.0 K/UL    ABS. EOSINOPHILS 0.0 0.0 - 0.4 K/UL    ABS.  BASOPHILS 0.0 0.0 - 0.06 K/UL    DF MANUAL      PLATELET COMMENTS ADEQUATE PLATELETS      RBC COMMENTS ANISOCYTOSIS  1+        RBC COMMENTS HYPOCHROMIA  1+       METABOLIC PANEL, BASIC    Collection Time: 08/22/20  3:22 AM   Result Value Ref Range    Sodium 145 136 - 145 mmol/L    Potassium 3.4 (L) 3.5 - 5.5 mmol/L    Chloride 111 100 - 111 mmol/L    CO2 29 21 - 32 mmol/L    Anion gap 5 3.0 - 18 mmol/L    Glucose 91 74 - 99 mg/dL    BUN 22 (H) 7.0 - 18 MG/DL    Creatinine 1.20 0.6 - 1.3 MG/DL BUN/Creatinine ratio 18 12 - 20      GFR est AA 55 (L) >60 ml/min/1.73m2    GFR est non-AA 46 (L) >60 ml/min/1.73m2    Calcium 8.6 8.5 - 10.1 MG/DL   PTT    Collection Time: 08/22/20  1:04 PM   Result Value Ref Range    aPTT 40.7 (H) 23.0 - 36.4 SEC     CT Results  (Last 48 hours)               08/22/20 0828  CT HEAD WO CONT Final result    Impression:  Impression:        Rather large subacute/evolving left MCA territory stroke is again seen as   described previously with associated edema and mild mass effect similar to   prior. No evidence of hemorrhagic conversion. No acute CT findings are seen compared to the prior head CT. Narrative:  NONCONTRAST HEAD CT       CPT CODE: 00896       INDICATION: Above. Altered mental status. Recent CVA. COMPARISON: 8/19/2020       TECHNIQUE: Serial axial CT images through the brain were obtained without   administration of intravenous contrast. Additional coronal and sagittal   reformation images were also performed. All CT scans at this facility are performed using dose optimization technique as   appropriate to the performed exam, to include automated exposure control,   adjustment of the mA and/or kV according to patient's size (Including   appropriate matching for site-specific examinations), or use of iterative   reconstruction technique. FINDINGS:       The head is mildly tilted. Streak artifacts noted. Again seen is previously described rather large region of hypoattenuation in the   left MCA territory consistent with recent stroke. There is evidence of residual   edema with effacement of the cortical sulci and left lateral ventricle, and   approximately 4 mm left-to-right midline shift, similar to prior. No evidence of acute intracranial hemorrhage. No evidence of hydrocephalus. No mass lesion identified.          No evidence of new acute ischemic stroke/ cerebrovascular accident (CVA) since   the prior head CT is identified. Head CT is often insensitive early to acute   ischemic stroke. The visualized portions of the paranasal sinuses demonstrate no significant   mucosal pathology. The mastoid air cells are aerated  The calvarium appears   intact. 08/21/20 1518  CTA [address] [facility] CONT Final result    Impression:  IMPRESSION:   1. Study is positive for bilateral upper lobe pulmonary emboli as discussed,   with suspect distal subsegmental right lower lobe and questionable anterior left   lower lobe filling defects as above. -Positive findings known from CT neck previous day. 2. Mild reflux of contrast into the hepatic veins and IVC, but no other definite   findings of significant heart strain. 3. Multifocal areas of groundglass to peripheral consolidative airspace   infiltrates could reflect acute inflammation or pneumonia, but may also   represent manifestation of developing pulmonary infarct given distribution. 4. Cardiomegaly without effusion. Nonenlarged aorta. Narrative:  CT CHEST PULMONARY EMBOLISM PROTOCOL       CPT code: 81854       INDICATION: Altered mental status. Hypertension. Pulmonary embolism noted on   head and neck CT August 19. TECHNIQUE: 2.5mm collimation helcial images obtained through the level of the   pulmonary arteries with additional imaging through the chest following the   uneventful administration of 100 cc's nonionic intravenous contrast.    - Images reconstructed into three dimensional coronal and sagittal projections   for complete evaluation of the tortuous and overlapping pulmonary vascular   structures and to reduce patient radiation dose. All CT scans at this facility are performed using dose optimization technique as   appropriate to this specific exam, to include automated exposure control,   adjustment of the mA and/or KP according to patient size or use of iterative   reconstruction techniques.        COMPARISON: Head and neck CT 8/19/2020       FINDINGS:   Opacification of the pulmonary arteries is adequate. Redemonstrated is a linear nonocclusive filling defects within the left upper   lobe anterior lobe subsegmental branches image 96 and also more central image   93. Linear nonocclusive filling defect present within the anterior right upper lobe   subsegmental branch image 71 with subsegmental filling defect also likely in   branch of the apical segment. Previous occlusive filling defect in the posterior right upper lobe segmental   branch remains. Questionable distal subsegmental right lower lobe posterior basal to lateral   basal segment filling defect as well. Possible subsegmental filling defect in   the anterior to lateral basal segment left lower lobe though significantly   motion degraded. Geographic region of central to peripheral groundglass infiltrates with   subpleural more dense consolidation involving the posterior and dorsal apical   segment of the right upper lobe similar to the previous exam suggests evolving   infarct. Small volume similar-appearing peripheral wedge-shaped opacity in the   posterior lateral segment right middle lobe and several areas of   similar-appearing groundglass infiltrate in the posterior and lateral segments   of the right lower lobe. Small wedge-shaped opacity in the posterior medial segment left lower lobe. Subsegmental curvilinear atelectasis in the inferior left upper lobe. Heart size is mildly enlarged, left ventricular predominance. No pericardial effusion. No significant septal deviation appreciated. Reflux of intravenous contrast into the IVC and minimally into the hepatic   veins.    Main pulmonary artery is not enlarged by size criteria.

## 2020-08-22 NOTE — PROGRESS NOTES
[facility]      CARDIOLOGY PROGRESS NOTE  RECS:    1. Acute CVA-  Neurology was consulted. MRI and echo have been ordered by medical team   2. Elevate troponin-likely demand ischemia in the setting acute CVA, PE and possible pneumonia- ekg shows shows NSR with LBBB no acute ischemic changes. Continue asa. Consider bb when Neurology clear patient. folow echo to assess lvf, rvf. Or any significant vhd. Further plans based on clinical course  3. Elevated D-dimer- and Acute PE s/p IVC filter  4. Suspected COV-19. Not detected   5. Right sided pneumonia: Concern for aspiration- being managed by medical team s/p NGT  6. Permissive hypertension- medications per Neurology   7. Dysphagia- in the setting #1  8. LBBB- old   5. Hypokalemia- replete as needed  10. Hypernatremia- on genlty hydration       Blood pressure is rising and as discussed with hospitalist, Dr. Bell Rising start low-dose Norvasc as it has been almost 72 hours since admission. Mildly elevated troponin likely from demand ischemia. Continue supportive care. ASSESSMENT:  Hospital Problems  Never Reviewed          Codes Class Noted POA    Cellulitis ICD-10-CM: L03.90  ICD-9-CM: 682.9  8/19/2020 Unknown        Elevated troponin I level ICD-10-CM: R79.89  ICD-9-CM: 790.6  8/19/2020 Unknown        Cerebrovascular accident (CVA) [facility]) ICD-10-CM: I63.9  ICD-9-CM: 434.91  8/19/2020 Unknown        CVA (cerebral vascular accident) [facility]) ICD-10-CM: I63.9  ICD-9-CM: 434.91  8/19/2020 Unknown                SUBJECTIVE:  No CP or SOB    OBJECTIVE:    VS:   Visit Vitals  BP (!) 151/98 (BP 1 Location: Left leg, BP Patient Position: At rest)   Pulse 82   Temp 97.1 °F (36.2 °C)   Resp 22   Ht 5' 3\" (1.6 m)   Wt 72 kg (158 lb 11.2 oz)   SpO2 95%   BMI 28.11 kg/m²       No intake or output data in the 24 hours ending 08/22/20 1215  TELE: normal sinus rhythm    General: alert and in no apparent distress  HENT: Normocephalic, atraumatic. Normal external eye. Neck :  no JVD  Cardiac:  regular rate and rhythm, S1, S2 normal, no murmur, click, rub or gallop  Lungs: clear to auscultation bilaterally, diminished breath sounds R base, L base  Abdomen: Soft, nontender, no masses, NG tube  Neuro: right sided weakness  Extremities:  No c/c/e, peripheral pulses present      Labs: Results:       Chemistry Recent Labs     08/22/20 0322 08/21/20  0634 08/20/20  0812 08/19/20  1537   GLU 91 141* 99 113*    143 150* 147*   K 3.4* 3.7 3.0* 3.6    111 113* 114*   CO2 29 26 27 23   BUN 22* 21* 16 17   CREA 1.20 0.93 0.75 0.94   CA 8.6 8.2* 8.5 8.9   AGAP 5 6 10 10   BUCR 18 23* 21* 18   AP  --   --   --  117   TP  --   --   --  7.8   ALB  --   --   --  3.5   GLOB  --   --   --  4.3*   AGRAT  --   --   --  0.8      CBC w/Diff Recent Labs     08/22/20 0322 08/21/20 0634 08/20/20  0812   WBC 20.5* 19.3* 18.1*   RBC 4.85 5.13 5.61*   HGB 13.7 14.4 15.9   HCT 42.1 44.9 48.7*    184 193   GRANS 77* 82* 88*   LYMPH 12* 6* 7*   EOS 0 0 0      Cardiac Enzymes Recent Labs     08/22/20 0322 08/21/20 0634 08/20/20  0812 08/19/20  2112   * 718* 647* 778*   CKND1  --   --  0.5 0.7      Coagulation Recent Labs     08/22/20 0322 08/21/20 0634  08/19/20  1537   PTP  --   --   --  15.3*   INR  --   --   --  1.2   APTT 67.9* 90.4*   < >  --     < > = values in this interval not displayed. Lipid Panel Lab Results   Component Value Date/Time    Cholesterol, total 153 08/20/2020 08:12 AM    HDL Cholesterol 45 08/20/2020 08:12 AM    LDL, calculated 88.2 08/20/2020 08:12 AM    VLDL, calculated 19.8 08/20/2020 08:12 AM    Triglyceride 99 08/20/2020 08:12 AM    CHOL/HDL Ratio 3.4 08/20/2020 08:12 AM      BNP No results for input(s): BNPP in the last 72 hours.    Liver Enzymes Recent Labs     08/19/20  1537   TP 7.8   ALB 3.5         Thyroid Studies No results found for: T4, T3U, TSH, TSHEXT           Ashley Torres, NP

## 2020-08-22 NOTE — ROUTINE PROCESS
Received verbal report from [facility], report included SBAR, MAR, and Kardex. Gave verbal report to [facility].

## 2020-08-22 NOTE — CONSULTS
Nutrition Assessment     Type and Reason for Visit: Reassess, Positive nutrition screen, Consult(Management of Tube Feeding)    Nutrition Recommendations/Plan:  - Once NGT placement confirmed via KUB, start tube feeding of Jevity 1.5 at 20 mL/hr and advance as tolerated by 10 mL q 8 hours to goal rate of 50 mL/hr with 150 mL q 4 hour water flushes.   - IVF per MD. Consider modifying as appropriate once tolerating tube feeding pending fluid balance. Nutrition Assessment:  NGT placed and awaiting comfirmation of placement via KUB. Contniues on IVF with dextrose/potassium and po medications being held. Malnutrition Assessment:  Malnutrition Status: At risk for malnutrition (specify)(unable to tolerate oral diet due to dysphagia, altered mentation/confusion)     Estimated Daily Nutrient Needs:  Energy (kcal):  4078-4726  Protein (g):  58-86       Fluid (ml/day):  1412-4545    Nutrition Related Findings:  BM PTA. Ascites.  AMS      Current Nutrition Therapies:  DIET NPO  DIET TUBE FEEDING   Current Tube Feeding (TF) Orders:   · Feeding Route: Nasogastric  · Formula: Jevity 1.5  · Schedule:Continuous    · Regimen Ordered: start at 20 mL/hr and advance by 10 mL q 8 hours to 50 mL/hr  · Additives/Modulars:    · Water Flushes: 150 mL q 4 hours (900 mL)  · Current TF & Flush Provides: not applicable  · Goal TF & Flush Orders Provides: 1800 kcal, 77 gm protein, 912 mL free water, 100% RDIs    Anthropometric Measures:  · Height:  5' 3\" (160 cm)  · Current Body Wt:  72 kg (158 lb 11.2 oz)  · BMI: 28.1    Nutrition Diagnosis:   · Inadequate oral intake related to cognitive or neurological impairment, swallowing difficulty as evidenced by NPO or clear liquid status due to medical condition, swallowing study results    Nutrition Intervention:  Food and/or Nutrient Delivery: Continue NPO, Start tube feeding, IV fluid delivery  Nutrition Education and Counseling: No recommendations at this time  Coordination of Nutrition Care: Continued inpatient monitoring, Speech therapy(obtained order for management of tube feeding from MD)    Goals:  Patient will tolerate enteral nutrition formula and regimen without difficulty within the next 7 days. Nutrition Monitoring and Evaluation:   Food/Nutrient Intake Outcomes: Enteral nutrition intake/tolerance, Vitamin/mineral intake, IVF intake  Physical Signs/Symptoms Outcomes: Biochemical data, Chewing or swallowing, GI status, Fluid status or edema, Nutrition focused physical findings    Discharge Planning:     Too soon to determine     Electronically signed by Demetra Bernheim, RD on 8/22/2020 at [facility] Number: [phone]

## 2020-08-23 ENCOUNTER — APPOINTMENT (OUTPATIENT)
Dept: MRI IMAGING | Age: 65
DRG: 040 | End: 2020-08-23
Attending: INTERNAL MEDICINE
Payer: COMMERCIAL

## 2020-08-23 ENCOUNTER — APPOINTMENT (OUTPATIENT)
Dept: GENERAL RADIOLOGY | Age: 65
DRG: 040 | End: 2020-08-23
Attending: INTERNAL MEDICINE
Payer: COMMERCIAL

## 2020-08-23 LAB
ANION GAP SERPL CALC-SCNC: 6 MMOL/L (ref 3–18)
APTT PPP: 119.1 SEC (ref 23–36.4)
APTT PPP: 95.5 SEC (ref 23–36.4)
BASOPHILS # BLD: 0 K/UL (ref 0–0.1)
BASOPHILS NFR BLD: 0 % (ref 0–2)
BUN SERPL-MCNC: 18 MG/DL (ref 7–18)
BUN/CREAT SERPL: 19 (ref 12–20)
CALCIUM SERPL-MCNC: 8.1 MG/DL (ref 8.5–10.1)
CHLORIDE SERPL-SCNC: 117 MMOL/L (ref 100–111)
CO2 SERPL-SCNC: 28 MMOL/L (ref 21–32)
CREAT SERPL-MCNC: 0.94 MG/DL (ref 0.6–1.3)
DIFFERENTIAL METHOD BLD: ABNORMAL
EOSINOPHIL # BLD: 0 K/UL (ref 0–0.4)
EOSINOPHIL NFR BLD: 0 % (ref 0–5)
ERYTHROCYTE [DISTWIDTH] IN BLOOD BY AUTOMATED COUNT: 15.7 % (ref 11.6–14.5)
GLUCOSE SERPL-MCNC: 133 MG/DL (ref 74–99)
HCT VFR BLD AUTO: 42.3 % (ref 35–45)
HGB BLD-MCNC: 13.4 G/DL (ref 12–16)
LYMPHOCYTES # BLD: 1.4 K/UL (ref 0.9–3.6)
LYMPHOCYTES NFR BLD: 8 % (ref 21–52)
MCH RBC QN AUTO: 27.4 PG (ref 24–34)
MCHC RBC AUTO-ENTMCNC: 31.7 G/DL (ref 31–37)
MCV RBC AUTO: 86.5 FL (ref 74–97)
MONOCYTES # BLD: 1.6 K/UL (ref 0.05–1.2)
MONOCYTES NFR BLD: 9 % (ref 3–10)
NEUTS SEG # BLD: 14.4 K/UL (ref 1.8–8)
NEUTS SEG NFR BLD: 83 % (ref 40–73)
PLATELET # BLD AUTO: 208 K/UL (ref 135–420)
PMV BLD AUTO: 12.4 FL (ref 9.2–11.8)
POTASSIUM SERPL-SCNC: 3.1 MMOL/L (ref 3.5–5.5)
RBC # BLD AUTO: 4.89 M/UL (ref 4.2–5.3)
SODIUM SERPL-SCNC: 151 MMOL/L (ref 136–145)
WBC # BLD AUTO: 17.4 K/UL (ref 4.6–13.2)

## 2020-08-23 PROCEDURE — 36415 COLL VENOUS BLD VENIPUNCTURE: CPT

## 2020-08-23 PROCEDURE — 74011250636 HC RX REV CODE- 250/636: Performed by: INTERNAL MEDICINE

## 2020-08-23 PROCEDURE — 80048 BASIC METABOLIC PNL TOTAL CA: CPT

## 2020-08-23 PROCEDURE — 94640 AIRWAY INHALATION TREATMENT: CPT

## 2020-08-23 PROCEDURE — 85730 THROMBOPLASTIN TIME PARTIAL: CPT

## 2020-08-23 PROCEDURE — 74011250637 HC RX REV CODE- 250/637: Performed by: HOSPITALIST

## 2020-08-23 PROCEDURE — 70030 X-RAY EYE FOR FOREIGN BODY: CPT

## 2020-08-23 PROCEDURE — 74011250637 HC RX REV CODE- 250/637: Performed by: INTERNAL MEDICINE

## 2020-08-23 PROCEDURE — 94762 N-INVAS EAR/PLS OXIMTRY CONT: CPT

## 2020-08-23 PROCEDURE — 74018 RADEX ABDOMEN 1 VIEW: CPT

## 2020-08-23 PROCEDURE — 85025 COMPLETE CBC W/AUTO DIFF WBC: CPT

## 2020-08-23 PROCEDURE — 74011000258 HC RX REV CODE- 258: Performed by: INTERNAL MEDICINE

## 2020-08-23 PROCEDURE — 70551 MRI BRAIN STEM W/O DYE: CPT

## 2020-08-23 PROCEDURE — 77010033678 HC OXYGEN DAILY

## 2020-08-23 PROCEDURE — 74011250637 HC RX REV CODE- 250/637: Performed by: EMERGENCY MEDICINE

## 2020-08-23 PROCEDURE — 65270000029 HC RM PRIVATE

## 2020-08-23 RX ORDER — AMLODIPINE BESYLATE 5 MG/1
5 TABLET ORAL DAILY
Status: DISCONTINUED | OUTPATIENT
Start: 2020-08-24 | End: 2020-08-25

## 2020-08-23 RX ADMIN — IPRATROPIUM BROMIDE AND ALBUTEROL 1 PUFF: 20; 100 SPRAY, METERED RESPIRATORY (INHALATION) at 16:30

## 2020-08-23 RX ADMIN — MELATONIN 3 MG: at 21:58

## 2020-08-23 RX ADMIN — ASPIRIN 300 MG: 600 SUPPOSITORY RECTAL at 09:05

## 2020-08-23 RX ADMIN — PIPERACILLIN SODIUM AND TAZOBACTAM SODIUM 3.38 G: 3; .375 INJECTION, POWDER, LYOPHILIZED, FOR SOLUTION INTRAVENOUS at 20:47

## 2020-08-23 RX ADMIN — Medication 10 ML: at 14:59

## 2020-08-23 RX ADMIN — POTASSIUM BICARBONATE 40 MEQ: 782 TABLET, EFFERVESCENT ORAL at 13:11

## 2020-08-23 RX ADMIN — IPRATROPIUM BROMIDE AND ALBUTEROL 1 PUFF: 20; 100 SPRAY, METERED RESPIRATORY (INHALATION) at 04:12

## 2020-08-23 RX ADMIN — IPRATROPIUM BROMIDE AND ALBUTEROL 1 PUFF: 20; 100 SPRAY, METERED RESPIRATORY (INHALATION) at 06:59

## 2020-08-23 RX ADMIN — IPRATROPIUM BROMIDE AND ALBUTEROL 1 PUFF: 20; 100 SPRAY, METERED RESPIRATORY (INHALATION) at 23:55

## 2020-08-23 RX ADMIN — Medication 10 ML: at 21:58

## 2020-08-23 RX ADMIN — POTASSIUM BICARBONATE 40 MEQ: 782 TABLET, EFFERVESCENT ORAL at 09:04

## 2020-08-23 RX ADMIN — PIPERACILLIN SODIUM AND TAZOBACTAM SODIUM 3.38 G: 3; .375 INJECTION, POWDER, LYOPHILIZED, FOR SOLUTION INTRAVENOUS at 02:31

## 2020-08-23 RX ADMIN — IPRATROPIUM BROMIDE AND ALBUTEROL 1 PUFF: 20; 100 SPRAY, METERED RESPIRATORY (INHALATION) at 20:25

## 2020-08-23 RX ADMIN — IPRATROPIUM BROMIDE AND ALBUTEROL 1 PUFF: 20; 100 SPRAY, METERED RESPIRATORY (INHALATION) at 07:00

## 2020-08-23 RX ADMIN — IPRATROPIUM BROMIDE AND ALBUTEROL 1 PUFF: 20; 100 SPRAY, METERED RESPIRATORY (INHALATION) at 00:16

## 2020-08-23 RX ADMIN — Medication 500 MG: at 09:04

## 2020-08-23 RX ADMIN — Medication 10 ML: at 05:03

## 2020-08-23 RX ADMIN — AMLODIPINE BESYLATE 2.5 MG: 5 TABLET ORAL at 09:05

## 2020-08-23 RX ADMIN — IPRATROPIUM BROMIDE AND ALBUTEROL 1 PUFF: 20; 100 SPRAY, METERED RESPIRATORY (INHALATION) at 12:00

## 2020-08-23 RX ADMIN — HEPARIN SODIUM 24 UNITS/KG/HR: 10000 INJECTION, SOLUTION INTRAVENOUS at 13:11

## 2020-08-23 RX ADMIN — ATORVASTATIN CALCIUM 80 MG: 40 TABLET, FILM COATED ORAL at 21:58

## 2020-08-23 RX ADMIN — PIPERACILLIN SODIUM AND TAZOBACTAM SODIUM 3.38 G: 3; .375 INJECTION, POWDER, LYOPHILIZED, FOR SOLUTION INTRAVENOUS at 09:05

## 2020-08-23 RX ADMIN — PIPERACILLIN SODIUM AND TAZOBACTAM SODIUM 3.38 G: 3; .375 INJECTION, POWDER, LYOPHILIZED, FOR SOLUTION INTRAVENOUS at 14:59

## 2020-08-23 NOTE — PROGRESS NOTES
SUBJECTIVE:     Patient is more awake today. She follows commands. However it is hard to understand her speech. NG tube is in situ. She denies for having pain in chest or abdomen by nodding her head.     OBJECTIVE:     /90 (BP 1 Location: Left arm, BP Patient Position: At rest)   Pulse 84   Temp 98.8 °F (37.1 °C)   Resp 20   Ht 5' 3\" (1.6 m)   Wt 72.7 kg (160 lb 3.2 oz)   SpO2 99%   BMI 28.38 kg/m²      General appearance -awake, NAD. NG tube is in situ. Chest -diminished air entry noted in bases, poor inspiratory efforts, no wheezes  Heart - S1 and S2 normal  Abdomen - soft, nontender, nondistended, Bowel sounds present  Neurological -awake, motor strength 0 out of 5 in right upper extremity and right lower extremity and 5 out of 5 in the left upper extremity and 4-5 out of 5 in left lower extremity  Extremities - no pedal edema noted     ASSESSMENT:     1. Right-sided weakness due to left CVA  2. Aspiration pneumonia  3. Dysphagia due to stroke  4. Left-sided PE and bilateral lower extremity DVT status post IVC filter  5. Hypertension  6. Elevated inflammatory biomarkers due to stroke, negative for COVID-19  7. Indeterminate elevation of troponin due to demand ischemia  8. Elevated BNP, no overt CHF  9. Leukocytosis due to #2 and #4, improving  10. Hypokalemia     PLAN:     Continue current management  MRI of the brain is pending so we will do screening x-rays to have MRI done  Replace potassium  Continue heparin drip at this point    Continue NG tube and tube feeding, speech therapist to follow. The plan is to check modified barium swallow versus reevaluation of speech therapy tomorrow to determine the need for PEG tube. Discussed with patient's sister over the phone about the plan of care    Disclaimer: Sections of this note are dictated using utilizing voice recognition software, which may have resulted in some phonetic based errors in grammar and contents.  Even though attempts were made to correct all the mistakes, some may have been missed, and remained in the body of the document. If questions arise, please contact our department. Recent Results (from the past 24 hour(s))   CBC WITH AUTOMATED DIFF    Collection Time: 08/23/20 12:19 AM   Result Value Ref Range    WBC 17.4 (H) 4.6 - 13.2 K/uL    RBC 4.89 4.20 - 5.30 M/uL    HGB 13.4 12.0 - 16.0 g/dL    HCT 42.3 35.0 - 45.0 %    MCV 86.5 74.0 - 97.0 FL    MCH 27.4 24.0 - 34.0 PG    MCHC 31.7 31.0 - 37.0 g/dL    RDW 15.7 (H) 11.6 - 14.5 %    PLATELET 811 648 - 947 K/uL    MPV 12.4 (H) 9.2 - 11.8 FL    NEUTROPHILS 83 (H) 40 - 73 %    LYMPHOCYTES 8 (L) 21 - 52 %    MONOCYTES 9 3 - 10 %    EOSINOPHILS 0 0 - 5 %    BASOPHILS 0 0 - 2 %    ABS. NEUTROPHILS 14.4 (H) 1.8 - 8.0 K/UL    ABS. LYMPHOCYTES 1.4 0.9 - 3.6 K/UL    ABS. MONOCYTES 1.6 (H) 0.05 - 1.2 K/UL    ABS. EOSINOPHILS 0.0 0.0 - 0.4 K/UL    ABS. BASOPHILS 0.0 0.0 - 0.1 K/UL    DF AUTOMATED     METABOLIC PANEL, BASIC    Collection Time: 08/23/20 12:19 AM   Result Value Ref Range    Sodium 151 (H) 136 - 145 mmol/L    Potassium 3.1 (L) 3.5 - 5.5 mmol/L    Chloride 117 (H) 100 - 111 mmol/L    CO2 28 21 - 32 mmol/L    Anion gap 6 3.0 - 18 mmol/L    Glucose 133 (H) 74 - 99 mg/dL    BUN 18 7.0 - 18 MG/DL    Creatinine 0.94 0.6 - 1.3 MG/DL    BUN/Creatinine ratio 19 12 - 20      GFR est AA >60 >60 ml/min/1.73m2    GFR est non-AA >60 >60 ml/min/1.73m2    Calcium 8.1 (L) 8.5 - 10.1 MG/DL   PTT    Collection Time: 08/23/20 12:19 AM   Result Value Ref Range    aPTT 119.1 (H) 23.0 - 36.4 SEC   PTT    Collection Time: 08/23/20  9:37 AM   Result Value Ref Range    aPTT 95.5 (H) 23.0 - 36.4 SEC     CT Results  (Last 48 hours)               08/22/20 0828  CT HEAD WO CONT Final result    Impression:  Impression:        Rather large subacute/evolving left MCA territory stroke is again seen as   described previously with associated edema and mild mass effect similar to   prior.  No evidence of hemorrhagic conversion. No acute CT findings are seen compared to the prior head CT. Narrative:  NONCONTRAST HEAD CT       CPT CODE: 22762       INDICATION: Above. Altered mental status. Recent CVA. COMPARISON: 8/19/2020       TECHNIQUE: Serial axial CT images through the brain were obtained without   administration of intravenous contrast. Additional coronal and sagittal   reformation images were also performed. All CT scans at this facility are performed using dose optimization technique as   appropriate to the performed exam, to include automated exposure control,   adjustment of the mA and/or kV according to patient's size (Including   appropriate matching for site-specific examinations), or use of iterative   reconstruction technique. FINDINGS:       The head is mildly tilted. Streak artifacts noted. Again seen is previously described rather large region of hypoattenuation in the   left MCA territory consistent with recent stroke. There is evidence of residual   edema with effacement of the cortical sulci and left lateral ventricle, and   approximately 4 mm left-to-right midline shift, similar to prior. No evidence of acute intracranial hemorrhage. No evidence of hydrocephalus. No mass lesion identified. No evidence of new acute ischemic stroke/ cerebrovascular accident (CVA) since   the prior head CT is identified. Head CT is often insensitive early to acute   ischemic stroke. The visualized portions of the paranasal sinuses demonstrate no significant   mucosal pathology. The mastoid air cells are aerated  The calvarium appears   intact. 08/21/20 1518  CTA [facility] Final result    Impression:  IMPRESSION:   1. Study is positive for bilateral upper lobe pulmonary emboli as discussed,   with suspect distal subsegmental right lower lobe and questionable anterior left   lower lobe filling defects as above.    -Positive findings known from CT neck previous day. 2. Mild reflux of contrast into the hepatic veins and IVC, but no other definite   findings of significant heart strain. 3. Multifocal areas of groundglass to peripheral consolidative airspace   infiltrates could reflect acute inflammation or pneumonia, but may also   represent manifestation of developing pulmonary infarct given distribution. 4. Cardiomegaly without effusion. Nonenlarged aorta. Narrative:  CT CHEST PULMONARY EMBOLISM PROTOCOL       CPT code: 86443       INDICATION: Altered mental status. Hypertension. Pulmonary embolism noted on   head and neck CT August 19. TECHNIQUE: 2.5mm collimation helcial images obtained through the level of the   pulmonary arteries with additional imaging through the chest following the   uneventful administration of 100 cc's nonionic intravenous contrast.    - Images reconstructed into three dimensional coronal and sagittal projections   for complete evaluation of the tortuous and overlapping pulmonary vascular   structures and to reduce patient radiation dose. All CT scans at this facility are performed using dose optimization technique as   appropriate to this specific exam, to include automated exposure control,   adjustment of the mA and/or KP according to patient size or use of iterative   reconstruction techniques. COMPARISON: Head and neck CT 8/19/2020       FINDINGS:   Opacification of the pulmonary arteries is adequate. Redemonstrated is a linear nonocclusive filling defects within the left upper   lobe anterior lobe subsegmental branches image 96 and also more central image   93. Linear nonocclusive filling defect present within the anterior right upper lobe   subsegmental branch image 71 with subsegmental filling defect also likely in   branch of the apical segment. Previous occlusive filling defect in the posterior right upper lobe segmental   branch remains. Questionable distal subsegmental right lower lobe posterior basal to lateral   basal segment filling defect as well. Possible subsegmental filling defect in   the anterior to lateral basal segment left lower lobe though significantly   motion degraded. Geographic region of central to peripheral groundglass infiltrates with   subpleural more dense consolidation involving the posterior and dorsal apical   segment of the right upper lobe similar to the previous exam suggests evolving   infarct. Small volume similar-appearing peripheral wedge-shaped opacity in the   posterior lateral segment right middle lobe and several areas of   similar-appearing groundglass infiltrate in the posterior and lateral segments   of the right lower lobe. Small wedge-shaped opacity in the posterior medial segment left lower lobe. Subsegmental curvilinear atelectasis in the inferior left upper lobe. Heart size is mildly enlarged, left ventricular predominance. No pericardial effusion. No significant septal deviation appreciated. Reflux of intravenous contrast into the IVC and minimally into the hepatic   veins.    Main pulmonary artery is not enlarged by size criteria.

## 2020-08-23 NOTE — PROGRESS NOTES
Re:  [facility] up visit     8/23/2020 11:18 PM    SSN: [id number]    Subjective:   Shyam Silva was seen on follow up. No acute changes overnight. She is still mute but says 'yehh' to all questions. There is some movement over the right upper and lower extremities to pain. On heparin drip.        Medications:    Current Facility-Administered Medications   Medication Dose Route Frequency Provider Last Rate Last Dose    potassium bicarb-citric acid (EFFER-K) tablet 40 mEq  40 mEq Per NG tube Q4H Cathy Renee MD   40 mEq at 08/23/20 0904    [START ON 8/24/2020] amLODIPine (NORVASC) tablet 5 mg  5 mg Oral DAILY Melissa Shepherd, NP        ipratropium-albuterol (COMBIVENT RESPIMAT) 20 mcg-100 mcg inhalation spray  1 Puff Inhalation Q4H RT Cathy Renee MD   Stopped at 08/24/20 0800    dextrose 5% with KCl 20 mEq/L infusion   IntraVENous CONTINUOUS Cathy Renee MD 50 mL/hr at 08/23/20 0846      heparin (porcine) 25,000 units in 0.45% saline 250 ml infusion  18-36 Units/kg/hr IntraVENous TITRATE Cathy Renee MD 14.9 mL/hr at 08/23/20 0123 24 Units/kg/hr at 08/23/20 0123    piperacillin-tazobactam (ZOSYN) 3.375 g in 0.9% sodium chloride (MBP/ADV) 100 mL MBP  3.375 g IntraVENous Q6H Jace DIAS  mL/hr at 08/23/20 0905 3.375 g at 08/23/20 4656    aspirin (ASA) suppository 300 mg  300 mg Rectal DAILY Breezy Notice K, DO   300 mg at 08/23/20 8038    sodium chloride (NS) flush 5-40 mL  5-40 mL IntraVENous Q8H Micheal Curtis MD   10 mL at 08/23/20 0503    sodium chloride (NS) flush 5-40 mL  5-40 mL IntraVENous PRN Micheal Curtis MD        docusate sodium (COLACE) capsule 100 mg  100 mg Oral BID PRN Camron Khanna MD        acetaminophen (TYLENOL) suppository 650 mg  650 mg Rectal Q4H PRN Camron Khanna MD        labetaloL (NORMODYNE;TRANDATE) 20 mg/4 mL (5 mg/mL) injection 5 mg  5 mg IntraVENous Q10MIN PRN Santos Pop MD   5 mg at 08/20/20 1218    melatonin tablet 3 mg  3 mg Oral QHS Santos Pop MD   3 mg at 08/22/20 2129    ascorbic acid (vitamin C) (VITAMIN C) tablet 500 mg  500 mg Oral DAILY Santos Pop MD   500 mg at 08/23/20 9516    atorvastatin (LIPITOR) tablet 80 mg  80 mg Oral QHS Katie SHEPARD DO   80 mg at 08/22/20 2129       Vital signs:    Visit Vitals  BP (!) 141/100 (BP 1 Location: Left arm, BP Patient Position: At rest)   Pulse 85   Temp 98.4 °F (36.9 °C)   Resp 19   Ht 5' 3\" (1.6 m)   Wt 72.7 kg (160 lb 3.2 oz)   SpO2 98%   BMI 28.38 kg/m²       Review of Systems:   Unable to obtain due to her clinical condition. Patient Active Problem List   Diagnosis Code    Cellulitis L03.90    Elevated troponin I level R79.89    Cerebrovascular accident (CVA) ([facility] [address] I63.9    CVA (cerebral vascular accident) ([facility] [address] I63.9         Objective:   GENERAL:                  In no apparent distress. EXTREMITIES:           Slightly increased right lower extremity tone. No movement on the right upper or lower extremity. HEAD:                         Ear, nose, and throat appear to be without trauma. The patient is normocephalic.     NEUROLOGIC EXAMINATION    Mental status: Awake, alert, not answering any orientation questions; also follows simple midline commands; no gaze deviation. Speech and languge: Mute; impaired comprehension. CN: BTT only from the left side;  EOMI, PERRLA, right lower facial palsy, palate elevation symmetric bilat, tongue midline  Motor: Slightly decreased tone over the right upper extremity and slightly increased tone over the right lower extremity; strength is on the right side 0/5; left side 5 out of 5. Sensory: Responds to pain from all sites. Coordination: Unable to assess.   DTR: 2+ throughout, upgoing plantar on the right  Gait: not tested     Result Information      Status: Final result (Exam End: 8/19/2020 16:02)  Provider Status: Open    Study Result      EXAM: CT HEAD WO CONT     CLINICAL INDICATION/HISTORY: question CVA, no movement right side, down x 4 days     TECHNIQUE: Contiguous axial images were obtained through the brain.  From these,  sagittal and coronal reconstructions were generated.     CT scans at this facility are performed using dose optimization technique as  appropriate with performed exam, to include automated exposure control,  adjustment of mA and/or kV according to patient's size (including appropriate  matching for site-specific examinations), or use of iterative reconstruction  technique.     COMPARISON: None     FINDINGS:           Soft tissues: No significant findings.     Skull base/calvarium: Unremarkable.     Brain: There is a nonhemorrhagic acute to subacute infarction on the left. In  the axial plane involving an area of about 5 x 5 cm. There is mass effect on the  left lateral ventricle there is mild 3 to 4 mm midline shift.     No evidence of hemorrhagic conversion is seen.     There is a tiny mary of calcific density in the medial and the tiny increased  density in the posterior margins of the infarcted parenchyma suggesting  possibility of some distal calcific embolus see image 12 series 2. Some of the  density may represent stagnant or thrombosed blood within MCA territory branch  arteries.     This appears to be involving the cortex as well as the left basal ganglia  thalamic region and globus pallidus. Putamen is involved along its posterior  aspect.   There is evidence for some indeterminate probably old infarction in the right  anterior basal ganglia and thalamus seen on image 13 series 2  Ventricles and cisterns: No intraluminal bleed is seen     Orbits: Unremarkable.      Paranasal Sinuses: Clear      Other findings: None     IMPRESSION  IMPRESSION[de-identified]     Acute/subacute large nonhemorrhagic left MCA territory infarction as described     Small calcific density in the medial portion may represent some calcified  embolic material. Densities along the posterior aspect of the infarcted area may  represent some thrombosed vessels.      Result Information      Status: Final result (Exam End: 8/19/2020 17:02)  Provider Status: Open    Study Result      EXAM: CTA HEAD NECK W WO CONT     CLINICAL INDICATIONS: Eval for LVO     TECHNIQUE: Helical CT scan of the brain and neck were performed at 1.25 mm  intervals during rapid IV bolus contrast administration.  These data were  reconstructed at .625 mm intervals for vascular analysis.  The data was also  reviewed at 2.5 mm intervals for accompanying soft tissue analysis. 3D post  processed images, including surface shaded displays, were produced for this exam  on independent console, permanently archived and interpreted.     All CT scans at this facility are performed using dose optimization technique as  appropriate to a performed exam, to include automated exposure control,  adjustment of the MA and/or kV according to patient size (including appropriate  matching for site-specific examinations) or use of  iterative reconstruction  technique.     CONTRAST: Isovue 370     COMPARISON: None     CTA SOFT TISSUE ANALYSIS:  Lungs: Clear infiltrate on the right side moderate burden q.d. pulmonary embolus  identified in the vessels on the left side. Upper chest: Unremarkable  Neck: Unremarkable  Lymph nodes: No adenopathy  Orbits: Unremarkable  Paranasal sinuses: Clear  Brain: Evidence for a moderate to large left MCA territory infarction. The  distal branches appear to have recannulized. There is no evidence of any  proximal vessel cut off to the infarcted area on the left. Bones: Unremarkable for age.     CTA NECK VASCULAR ANALYSIS:      Aortic arch: Left sided with typical configuration.     Innominate: Patent     Right Subclavian: Patent  Left Subclavian: Patent     Right carotid:  -CCA: Patent  -ECA: Patent  -ICA: Patent.  Estimated % stenosis of proximal ICA by NASCET criteria.     Left carotid:  -CCA: Patent  -ECA: Patent  -ICA: Patent Estimated % stenosis of proximal ICA by NASCET criteria.     Right vertebral: Patent     Left vertebral: Patent        CTA BRAIN VASCULAR ANALYSIS:      Right anterior circulation:  -ICA: Patent  -JEFFERY: Patent   -MCA: Patent     Left anterior circulation:  -ICA: Patent  -JEFFERY: Patent   -MCA: Patent     -ACOM: Unremarkable  -PCOM: Patent     Posterior circulation:  -RVA: Patent  -LVA: Patent  -Basilar: Patent  -Right PCA: Patent  -Left PCA: Patent     Major dural venous sinuses: Unremarkable     Other:     There is evidence for moderate burden PE on the left side with extension  secondary and tertiary branches into the upper lobe. The entire lung is not  included here. The main pulmonary artery and right pulmonary artery are. These  are free of any additional defects.        IMPRESSION  Impression:     1. Patent intra- and extracranial brain arteries. Vessels surrounding the  infarcted area in the left frontoparietal region have recannulized. No evidence  of any proximal vessel cut off to the infarcted region is identified in the  proximal M1 and M2 segments     Evidence for at least left-sided moderate sized acute pulmonary embolism.           Result Information     Status: Final result (Exam End: 8/22/2020 08:28)  Provider Status: Open    Study Result     NONCONTRAST HEAD CT     CPT CODE: 57951     INDICATION: Above. Altered mental status.  Recent CVA.     COMPARISON: 8/19/2020     TECHNIQUE: Serial axial CT images through the brain were obtained without  administration of intravenous contrast. Additional coronal and sagittal  reformation images were also performed.     All CT scans at this facility are performed using dose optimization technique as  appropriate to the performed exam, to include automated exposure control,  adjustment of the mA and/or kV according to patient's size (Including  appropriate matching for site-specific examinations), or use of iterative  reconstruction technique.     FINDINGS:     The head is mildly tilted. Streak artifacts noted.     Again seen is previously described rather large region of hypoattenuation in the  left MCA territory consistent with recent stroke. There is evidence of residual  edema with effacement of the cortical sulci and left lateral ventricle, and  approximately 4 mm left-to-right midline shift, similar to prior.     No evidence of acute intracranial hemorrhage. No evidence of hydrocephalus.     No mass lesion identified.       No evidence of new acute ischemic stroke/ cerebrovascular accident (CVA) since  the prior head CT is identified. Head CT is often insensitive early to acute  ischemic stroke.      The visualized portions of the paranasal sinuses demonstrate no significant  mucosal pathology. The mastoid air cells are aerated  The calvarium appears  intact.     IMPRESSION  Impression:      Rather large subacute/evolving left MCA territory stroke is again seen as  described previously with associated edema and mild mass effect similar to  prior.  No evidence of hemorrhagic conversion.     No acute CT findings are seen compared to the prior head CT.         CBC:   Lab Results   Component Value Date/Time    WBC 17.4 (H) 08/23/2020 12:19 AM    RBC 4.89 08/23/2020 12:19 AM    HGB 13.4 08/23/2020 12:19 AM    HCT 42.3 08/23/2020 12:19 AM    PLATELET 230 20/92/4219 12:19 AM     BMP:   Lab Results   Component Value Date/Time    Glucose 133 (H) 08/23/2020 12:19 AM    Sodium 151 (H) 08/23/2020 12:19 AM    Potassium 3.1 (L) 08/23/2020 12:19 AM    Chloride 117 (H) 08/23/2020 12:19 AM    CO2 28 08/23/2020 12:19 AM    BUN 18 08/23/2020 12:19 AM    Creatinine 0.94 08/23/2020 12:19 AM    Calcium 8.1 (L) 08/23/2020 12:19 AM     CMP:   Lab Results   Component Value Date/Time    Glucose 133 (H) 08/23/2020 12:19 AM    Sodium 151 (H) 08/23/2020 12:19 AM    Potassium 3.1 (L) 08/23/2020 12:19 AM    Chloride 117 (H) 08/23/2020 12:19 AM    CO2 28 08/23/2020 12:19 AM    BUN 18 08/23/2020 12:19 AM    Creatinine 0.94 08/23/2020 12:19 AM    Calcium 8.1 (L) 08/23/2020 12:19 AM    Anion gap 6 08/23/2020 12:19 AM    BUN/Creatinine ratio 19 08/23/2020 12:19 AM    Alk. phosphatase 117 08/19/2020 03:37 PM    Protein, total 7.8 08/19/2020 03:37 PM    Albumin 3.5 08/19/2020 03:37 PM    Globulin 4.3 (H) 08/19/2020 03:37 PM    A-G Ratio 0.8 08/19/2020 03:37 PM     Coagulation:   Lab Results   Component Value Date/Time    Prothrombin time 15.3 (H) 08/19/2020 03:37 PM    INR 1.2 08/19/2020 03:37 PM    aPTT 95.5 (H) 08/23/2020 09:37 AM       Impression:      A 60years old female patient was brought to the emergency room after she was found down at her home of unknown duration [she was last seen about 4 days ago]. She has global aphasia with right-sided body weakness. Otherwise she is awake. No witnessed seizure. CT scan of the brain showed a large left MCA territory stroke with no hemorrhage. No previous history of stroke on the chart. No atrial fibrillation on telemetry. CT angiography of the head and neck was unremarkable. Incidental finding of left side pulmonary embolism. Doppler ultrasound of the lower extremities showed occlusive thrombus of the right proximal and distal deep veins. Patient had an inferior vena cava filter. She is on heparin drip with no bolusing.     Plan:  -Continuous telemetry/up with assistance  -Vitals/Neurochecks q4hrs  -MRI Brain w/o contrast: pendign  -TTE: pending; if negative might need JOCELYNE  -Management of pulmonary embolism: s/p IVC filter. On heparin drip currently;  anticoagulation in this patient with large MCA territory acute infarction is difficult.   At high risk of hemorrhagic conversion.    -Need to follow  for clinical worsening and serial CT scans: Repeat CT tomorrow.   -On  ASA 81mg Qdaily and Atorvastatin 80mg Qdaily  -PT/OT/ST    Sincerely,      Gill HOWELL Ed Hare M.D. PLEASE NOTE:   This document has been produced using voice recognition software. Unrecognized errors in transcription may be present.

## 2020-08-23 NOTE — PROGRESS NOTES
[facility]      CARDIOLOGY PROGRESS NOTE  RECS:    1. Acute CVA-  Neurology was consulted. MRI and echo have been ordered by medical team   2. Elevate troponin-likely demand ischemia in the setting acute CVA, PE and possible pneumonia-  Continue asa. Echo is pending   3. Elevated D-dimer- and Acute PE s/p IVC filter  4. COV-19. Not detected   5. Right sided pneumonia: Concern for aspiration- being managed by medical team s/p NGT  6. hypertension- elevated increase Norvasc. 7. Dysphagia- in the setting #1 has NGT  8. LBBB- old   5. Hypokalemia- being replaced. 10. Hypernatremia- on genlty hydration       Blood pressure is improving overall. Hopefully it will be better with increased Norvasc. Echo is still pending. Continue supportive care. ASSESSMENT:  Hospital Problems  Never Reviewed          Codes Class Noted POA    Cellulitis ICD-10-CM: L03.90  ICD-9-CM: 682.9  8/19/2020 Unknown        Elevated troponin I level ICD-10-CM: R79.89  ICD-9-CM: 790.6  8/19/2020 Unknown        Cerebrovascular accident (CVA) [facility]) ICD-10-CM: I63.9  ICD-9-CM: 434.91  8/19/2020 Unknown        CVA (cerebral vascular accident) [facility]) ICD-10-CM: I63.9  ICD-9-CM: 434.91  8/19/2020 Unknown                SUBJECTIVE:  No CP or SOB  Unable to communicate    OBJECTIVE:    VS:   Visit Vitals  BP (!) 141/100 (BP 1 Location: Left arm, BP Patient Position: At rest)   Pulse 85   Temp 98.4 °F (36.9 °C)   Resp 19   Ht 5' 3\" (1.6 m)   Wt 72.7 kg (160 lb 3.2 oz)   SpO2 98%   BMI 28.38 kg/m²         Intake/Output Summary (Last 24 hours) at 8/23/2020 0929  Last data filed at 8/23/2020 0412  Gross per 24 hour   Intake 900 ml   Output    Net 900 ml     TELE: normal sinus rhythm    General: alert and in no apparent distress. HENT: Normocephalic, atraumatic. Normal external eye.   Neck :  no JVD  Cardiac:  regular rate and rhythm, S1, S2 normal, no murmur, click, rub or gallop  Lungs: clear to auscultation bilaterally, diminished breath sounds R base, L base  Abdomen: Soft, nontender, no masses, NG tube  Neuro: right sided weakness. Extremities:  No c/c/e, peripheral pulses present      Labs: Results:       Chemistry Recent Labs     08/23/20  0019 08/22/20  0322 08/21/20  0634   * 91 141*   * 145 143   K 3.1* 3.4* 3.7   * 111 111   CO2 28 29 26   BUN 18 22* 21*   CREA 0.94 1.20 0.93   CA 8.1* 8.6 8.2*   AGAP 6 5 6   BUCR 19 18 23*      CBC w/Diff Recent Labs     08/23/20  0019 08/22/20  0322 08/21/20  0634   WBC 17.4* 20.5* 19.3*   RBC 4.89 4.85 5.13   HGB 13.4 13.7 14.4   HCT 42.3 42.1 44.9    197 184   GRANS 83* 77* 82*   LYMPH 8* 12* 6*   EOS 0 0 0      Cardiac Enzymes Recent Labs     08/22/20  0322 08/21/20  0634   * 718*      Coagulation Recent Labs     08/23/20  0019 08/22/20  1304   APTT 119.1* 40.7*       Lipid Panel Lab Results   Component Value Date/Time    Cholesterol, total 153 08/20/2020 08:12 AM    HDL Cholesterol 45 08/20/2020 08:12 AM    LDL, calculated 88.2 08/20/2020 08:12 AM    VLDL, calculated 19.8 08/20/2020 08:12 AM    Triglyceride 99 08/20/2020 08:12 AM    CHOL/HDL Ratio 3.4 08/20/2020 08:12 AM      BNP No results for input(s): BNPP in the last 72 hours. Liver Enzymes No results for input(s): TP, ALB, TBIL, AP in the last 72 hours. No lab exists for component: SGOT, GPT, DBIL   Thyroid Studies No results found for: T4, T3U, TSH, TSHEXT, TSHEXT           LEO Syed     I have independently evaluated and examined the patient. All relevant labs and testing data are reviewed. Care plan discussed and updated after review.   Javid Hansen MD

## 2020-08-23 NOTE — ROUTINE PROCESS
Bedside and Verbal shift change report given to Jermaine Noguera RN (oncoming nurse) by Kunal Chang RN 
 (offgoing nurse). Report included the following information SBAR and Kardex.

## 2020-08-23 NOTE — CONSULTS
Nutrition Assessment     Type and Reason for Visit: Reassess, Positive nutrition screen, Consult(Management of Tube Feeding)    Nutrition Recommendations/Plan:  - Continue to advanced tube feeding of Jevity 1.5 by 10 mL q 8 hours to goal rate of 50 mL//hr, increase water flushes to 200 mL q 4 hours. - IVF per MD.    Nutrition Assessment:  TF at 40 mL/hr, to be advanced to goal of 50 mL/hr today at 3pm. IVF with dextrose/potassium rate decreased. Na up to 151 mmol/L today. Malnutrition Assessment:  Malnutrition Status: At risk for malnutrition (specify)(unable to tolerate oral diet due to dysphagia, altered mentation/confusion)     Estimated Daily Nutrient Needs:  Energy (kcal):  7149-9323  Protein (g):  58-86       Fluid (ml/day):  6980-6060    Nutrition Related Findings:  BM PTA. Ascites. AMS.  80 mEq K replacment ordered today in addition to IVF. 0 ML GRV      Current Nutrition Therapies:  DIET NPO  DIET TUBE FEEDING   Current Tube Feeding (TF) Orders:   · Feeding Route: Nasogastric  · Formula: Jevity 1.5  · Schedule:Continuous    · Regimen: Currently at 40 mL/hr, advance by 10 mL q 8 hours to 50 mL/hr  · Additives/Modulars:    · Water Flushes: 150 mL q 4 hours (900 mL)  · Current TF & Flush Orders Provides: 1800 kcal, 77 gm protein, 912 mL free water, 100% RDIs  · Goal TF & Flush Orders Provides: 1800 kcal, 77 gm protein, 912 mL free water, 100% RDIs    Anthropometric Measures:  · Height:  5' 3\" (160 cm)  · Current Body Wt:  72.6 kg (160 lb)  · BMI: 28.4    Nutrition Diagnosis:   · Inadequate oral intake related to cognitive or neurological impairment, swallowing difficulty as evidenced by NPO or clear liquid status due to medical condition, swallowing study results    Nutrition Intervention:  Food and/or Nutrient Delivery: Continue NPO, Modify tube feeding, IV fluid delivery  Nutrition Education and Counseling: No recommendations at this time  Coordination of Nutrition Care: Continued inpatient monitoring, Speech therapy    Goals:  Patient will tolerate enteral nutrition formula and regimen without difficulty within the next 7 days. Nutrition Monitoring and Evaluation:   Food/Nutrient Intake Outcomes: Enteral nutrition intake/tolerance, Vitamin/mineral intake, IVF intake  Physical Signs/Symptoms Outcomes: Biochemical data, Chewing or swallowing, GI status, Fluid status or edema, Nutrition focused physical findings    Discharge Planning:     Too soon to determine     Electronically signed by Herrera Cedeno RD on 8/23/2020 at 10:22 AM    Contact Number: [phone]

## 2020-08-23 NOTE — PROGRESS NOTES
Tube feeds running per order.  Patient progressing towards goal.    Problem: Nutrition Deficit  Goal: *Optimize nutritional status  Outcome: Progressing Towards Goal

## 2020-08-23 NOTE — PROGRESS NOTES
Tube feeds increased to goal of 50ml/hr. Patient tolerating well with no residual noted. Will continue to monitor.

## 2020-08-24 ENCOUNTER — APPOINTMENT (OUTPATIENT)
Dept: NON INVASIVE DIAGNOSTICS | Age: 65
DRG: 040 | End: 2020-08-24
Attending: INTERNAL MEDICINE
Payer: COMMERCIAL

## 2020-08-24 LAB
ANION GAP SERPL CALC-SCNC: 4 MMOL/L (ref 3–18)
APTT PPP: 135.2 SEC (ref 23–36.4)
BASOPHILS # BLD: 0 K/UL (ref 0–0.1)
BASOPHILS NFR BLD: 0 % (ref 0–2)
BUN SERPL-MCNC: 14 MG/DL (ref 7–18)
BUN/CREAT SERPL: 18 (ref 12–20)
CALCIUM SERPL-MCNC: 8.3 MG/DL (ref 8.5–10.1)
CHLORIDE SERPL-SCNC: 114 MMOL/L (ref 100–111)
CO2 SERPL-SCNC: 28 MMOL/L (ref 21–32)
CREAT SERPL-MCNC: 0.76 MG/DL (ref 0.6–1.3)
DIFFERENTIAL METHOD BLD: ABNORMAL
ECHO AO ROOT DIAM: 2.85 CM
ECHO LA AREA 4C: 25.1 CM2
ECHO LA VOL 2C: 82.12 ML (ref 22–52)
ECHO LA VOL 4C: 89.54 ML (ref 22–52)
ECHO LA VOL BP: 91.21 ML (ref 22–52)
ECHO LA VOL/BSA BIPLANE: 51.58 ML/M2 (ref 16–28)
ECHO LA VOLUME INDEX A2C: 46.44 ML/M2 (ref 16–28)
ECHO LA VOLUME INDEX A4C: 50.64 ML/M2 (ref 16–28)
ECHO LV E' LATERAL VELOCITY: 6.01 CM/S
ECHO LV E' SEPTAL VELOCITY: 4.51 CM/S
ECHO LV EDV TEICHHOLZ: 1.04 ML
ECHO LV ESV TEICHHOLZ: 0.89 ML
ECHO LV INTERNAL DIMENSION DIASTOLIC: 5.79 CM (ref 3.9–5.3)
ECHO LV INTERNAL DIMENSION SYSTOLIC: 5.41 CM
ECHO LV IVSD: 0.87 CM (ref 0.6–0.9)
ECHO LV MASS 2D: 192.9 G (ref 67–162)
ECHO LV MASS INDEX 2D: 109.1 G/M2 (ref 43–95)
ECHO LV POSTERIOR WALL DIASTOLIC: 0.86 CM (ref 0.6–0.9)
ECHO LVOT CARDIAC OUTPUT: 2.54 LITER/MINUTE
ECHO LVOT DIAM: 1.82 CM
ECHO LVOT PEAK GRADIENT: 3.5 MMHG
ECHO LVOT PEAK VELOCITY: 94.03 CM/S
ECHO LVOT SV: 29.3 ML
ECHO LVOT VTI: 11.3 CM
ECHO MV A VELOCITY: 70.64 CM/S
ECHO MV E DECELERATION TIME (DT): 122 MS
ECHO MV E VELOCITY: 85.55 CM/S
ECHO MV E/A RATIO: 1.21
ECHO MV E/E' LATERAL: 14.23
ECHO MV E/E' RATIO (AVERAGED): 16.6
ECHO MV E/E' SEPTAL: 18.97
ECHO RV TAPSE: 1.63 CM (ref 1.5–2)
ECHO TV REGURGITANT MAX VELOCITY: 364.57 CM/S
ECHO TV REGURGITANT PEAK GRADIENT: 53.2 MMHG
EOSINOPHIL # BLD: 0.1 K/UL (ref 0–0.4)
EOSINOPHIL NFR BLD: 0 % (ref 0–5)
ERYTHROCYTE [DISTWIDTH] IN BLOOD BY AUTOMATED COUNT: 15.7 % (ref 11.6–14.5)
GLUCOSE SERPL-MCNC: 175 MG/DL (ref 74–99)
HCT VFR BLD AUTO: 38.9 % (ref 35–45)
HGB BLD-MCNC: 12.3 G/DL (ref 12–16)
LVFS 2D: 6.65 %
LVOT MG: 1.69 MMHG
LVOT MV: 0.59 CM/S
LVSV (TEICH): 13.44 ML
LYMPHOCYTES # BLD: 1.8 K/UL (ref 0.9–3.6)
LYMPHOCYTES NFR BLD: 11 % (ref 21–52)
MAGNESIUM SERPL-MCNC: 2.4 MG/DL (ref 1.6–2.6)
MCH RBC QN AUTO: 27.9 PG (ref 24–34)
MCHC RBC AUTO-ENTMCNC: 31.6 G/DL (ref 31–37)
MCV RBC AUTO: 88.2 FL (ref 74–97)
MONOCYTES # BLD: 1.7 K/UL (ref 0.05–1.2)
MONOCYTES NFR BLD: 11 % (ref 3–10)
MV DEC SLOPE: 7.01
NEUTS SEG # BLD: 12.2 K/UL (ref 1.8–8)
NEUTS SEG NFR BLD: 78 % (ref 40–73)
PLATELET # BLD AUTO: 247 K/UL (ref 135–420)
PMV BLD AUTO: 12.2 FL (ref 9.2–11.8)
POTASSIUM SERPL-SCNC: 3.8 MMOL/L (ref 3.5–5.5)
RBC # BLD AUTO: 4.41 M/UL (ref 4.2–5.3)
SODIUM SERPL-SCNC: 146 MMOL/L (ref 136–145)
WBC # BLD AUTO: 15.8 K/UL (ref 4.6–13.2)

## 2020-08-24 PROCEDURE — 92526 ORAL FUNCTION THERAPY: CPT

## 2020-08-24 PROCEDURE — 74011250637 HC RX REV CODE- 250/637: Performed by: INTERNAL MEDICINE

## 2020-08-24 PROCEDURE — 77010033678 HC OXYGEN DAILY

## 2020-08-24 PROCEDURE — 65270000029 HC RM PRIVATE

## 2020-08-24 PROCEDURE — 97535 SELF CARE MNGMENT TRAINING: CPT

## 2020-08-24 PROCEDURE — 93306 TTE W/DOPPLER COMPLETE: CPT

## 2020-08-24 PROCEDURE — 74011250637 HC RX REV CODE- 250/637: Performed by: HOSPITALIST

## 2020-08-24 PROCEDURE — 92507 TX SP LANG VOICE COMM INDIV: CPT

## 2020-08-24 PROCEDURE — 74011000258 HC RX REV CODE- 258: Performed by: INTERNAL MEDICINE

## 2020-08-24 PROCEDURE — 85025 COMPLETE CBC W/AUTO DIFF WBC: CPT

## 2020-08-24 PROCEDURE — 74011000250 HC RX REV CODE- 250: Performed by: INTERNAL MEDICINE

## 2020-08-24 PROCEDURE — 74011250636 HC RX REV CODE- 250/636: Performed by: INTERNAL MEDICINE

## 2020-08-24 PROCEDURE — 36415 COLL VENOUS BLD VENIPUNCTURE: CPT

## 2020-08-24 PROCEDURE — 77030038269 HC DRN EXT URIN PURWCK BARD -A

## 2020-08-24 PROCEDURE — 85730 THROMBOPLASTIN TIME PARTIAL: CPT

## 2020-08-24 PROCEDURE — 83735 ASSAY OF MAGNESIUM: CPT

## 2020-08-24 PROCEDURE — 97530 THERAPEUTIC ACTIVITIES: CPT

## 2020-08-24 PROCEDURE — 74011250637 HC RX REV CODE- 250/637: Performed by: NURSE PRACTITIONER

## 2020-08-24 PROCEDURE — 80048 BASIC METABOLIC PNL TOTAL CA: CPT

## 2020-08-24 PROCEDURE — 94762 N-INVAS EAR/PLS OXIMTRY CONT: CPT

## 2020-08-24 PROCEDURE — 94640 AIRWAY INHALATION TREATMENT: CPT

## 2020-08-24 PROCEDURE — 74011250637 HC RX REV CODE- 250/637: Performed by: EMERGENCY MEDICINE

## 2020-08-24 RX ORDER — SODIUM CHLORIDE 9 MG/ML
10 INJECTION INTRAMUSCULAR; INTRAVENOUS; SUBCUTANEOUS
Status: COMPLETED | OUTPATIENT
Start: 2020-08-24 | End: 2020-08-24

## 2020-08-24 RX ORDER — ASPIRIN 325 MG
325 TABLET ORAL DAILY
Status: DISCONTINUED | OUTPATIENT
Start: 2020-08-25 | End: 2020-08-25

## 2020-08-24 RX ADMIN — MELATONIN 3 MG: at 23:03

## 2020-08-24 RX ADMIN — PIPERACILLIN SODIUM AND TAZOBACTAM SODIUM 3.38 G: 3; .375 INJECTION, POWDER, LYOPHILIZED, FOR SOLUTION INTRAVENOUS at 02:43

## 2020-08-24 RX ADMIN — PIPERACILLIN SODIUM AND TAZOBACTAM SODIUM 3.38 G: 3; .375 INJECTION, POWDER, LYOPHILIZED, FOR SOLUTION INTRAVENOUS at 17:57

## 2020-08-24 RX ADMIN — Medication 10 ML: at 22:03

## 2020-08-24 RX ADMIN — IPRATROPIUM BROMIDE AND ALBUTEROL 1 PUFF: 20; 100 SPRAY, METERED RESPIRATORY (INHALATION) at 21:00

## 2020-08-24 RX ADMIN — Medication 500 MG: at 09:35

## 2020-08-24 RX ADMIN — Medication 10 ML: at 14:00

## 2020-08-24 RX ADMIN — ATORVASTATIN CALCIUM 80 MG: 40 TABLET, FILM COATED ORAL at 23:03

## 2020-08-24 RX ADMIN — DEXTROSE MONOHYDRATE AND POTASSIUM CHLORIDE: 5; .149 INJECTION, SOLUTION INTRAVENOUS at 11:27

## 2020-08-24 RX ADMIN — IPRATROPIUM BROMIDE AND ALBUTEROL 1 PUFF: 20; 100 SPRAY, METERED RESPIRATORY (INHALATION) at 04:50

## 2020-08-24 RX ADMIN — ASPIRIN 300 MG: 600 SUPPOSITORY RECTAL at 09:36

## 2020-08-24 RX ADMIN — SODIUM CHLORIDE 10 ML: 9 INJECTION, SOLUTION INTRAMUSCULAR; INTRAVENOUS; SUBCUTANEOUS at 15:00

## 2020-08-24 RX ADMIN — AMLODIPINE BESYLATE 5 MG: 5 TABLET ORAL at 09:35

## 2020-08-24 RX ADMIN — PIPERACILLIN SODIUM AND TAZOBACTAM SODIUM 3.38 G: 3; .375 INJECTION, POWDER, LYOPHILIZED, FOR SOLUTION INTRAVENOUS at 10:36

## 2020-08-24 NOTE — PROGRESS NOTES
SUBJECTIVE:     Patient is more awake today. She follows some verbal commands. However it is hard to understand her speech. NG tube is in situ. Okay with PEG tube placement.      OBJECTIVE:     BP (!) 141/101 (BP 1 Location: Left arm, BP Patient Position: At rest)   Pulse 85   Temp 97.6 °F (36.4 °C)   Resp 18   Ht 5' 3\" (1.6 m)   Wt 73.5 kg (162 lb 2.2 oz)   SpO2 90%   BMI 28.72 kg/m²      General appearance -awake, NAD. NG tube is in situ. Chest -diminished air entry noted in bases, poor inspiratory efforts, no wheezes  Heart - S1 and S2 normal  Abdomen - soft, nontender, nondistended, Bowel sounds present  Neurological -awake, motor strength 0 out of 5 in right upper extremity and right lower extremity and 5 out of 5 in the left upper extremity and 4-5 out of 5 in left lower extremity  Extremities - no pedal edema noted     ASSESSMENT:     1. 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 Aspiration pneumonia  3. Dysphagia due to stroke  4. Left-sided PE and bilateral lower extremity DVT status post IVC filter  5. Hypertension  6. Elevated inflammatory biomarkers due to stroke, negative for COVID-19  7. Indeterminate elevation of troponin due to demand ischemia  8. Elevated BNP, no overt CHF  9. Leukocytosis due to #2 and #4, improving  10. Hypokalemia     PLAN:     Continue current management  MRI of the brain report reviewed  GI consulted for PEG tube placement  Neurology to follow this patient  We will try to get echocardiogram done today  Continue heparin drip at this point    Discussed with patient's sister over the phone about the plan of care yesterday and I have left a voice message for her today. Disclaimer: Sections of this note are dictated using utilizing voice recognition software, which may have resulted in some phonetic based errors in grammar and contents.  Even though attempts were made to correct all the mistakes, some may have been missed, and remained in the body of the document. If questions arise, please contact our department. Recent Results (from the past 24 hour(s))   PTT    Collection Time: 08/24/20  5:14 AM   Result Value Ref Range    aPTT 135.2 (H) 23.0 - 36.4 SEC   CBC WITH AUTOMATED DIFF    Collection Time: 08/24/20  5:14 AM   Result Value Ref Range    WBC 15.8 (H) 4.6 - 13.2 K/uL    RBC 4.41 4.20 - 5.30 M/uL    HGB 12.3 12.0 - 16.0 g/dL    HCT 38.9 35.0 - 45.0 %    MCV 88.2 74.0 - 97.0 FL    MCH 27.9 24.0 - 34.0 PG    MCHC 31.6 31.0 - 37.0 g/dL    RDW 15.7 (H) 11.6 - 14.5 %    PLATELET 632 375 - 472 K/uL    MPV 12.2 (H) 9.2 - 11.8 FL    NEUTROPHILS 78 (H) 40 - 73 %    LYMPHOCYTES 11 (L) 21 - 52 %    MONOCYTES 11 (H) 3 - 10 %    EOSINOPHILS 0 0 - 5 %    BASOPHILS 0 0 - 2 %    ABS. NEUTROPHILS 12.2 (H) 1.8 - 8.0 K/UL    ABS. LYMPHOCYTES 1.8 0.9 - 3.6 K/UL    ABS. MONOCYTES 1.7 (H) 0.05 - 1.2 K/UL    ABS. EOSINOPHILS 0.1 0.0 - 0.4 K/UL    ABS.  BASOPHILS 0.0 0.0 - 0.1 K/UL    DF AUTOMATED     METABOLIC PANEL, BASIC    Collection Time: 08/24/20  5:14 AM   Result Value Ref Range    Sodium 146 (H) 136 - 145 mmol/L    Potassium 3.8 3.5 - 5.5 mmol/L    Chloride 114 (H) 100 - 111 mmol/L    CO2 28 21 - 32 mmol/L    Anion gap 4 3.0 - 18 mmol/L    Glucose 175 (H) 74 - 99 mg/dL    BUN 14 7.0 - 18 MG/DL    Creatinine 0.76 0.6 - 1.3 MG/DL    BUN/Creatinine ratio 18 12 - 20      GFR est AA >60 >60 ml/min/1.73m2    GFR est non-AA >60 >60 ml/min/1.73m2    Calcium 8.3 (L) 8.5 - 10.1 MG/DL   MAGNESIUM    Collection Time: 08/24/20  5:14 AM   Result Value Ref Range    Magnesium 2.4 1.6 - 2.6 mg/dL

## 2020-08-24 NOTE — PROGRESS NOTES
Problem: Mobility Impaired (Adult and Pediatric)  Goal: *Therapy Goal (Edit Goal, Insert Text)  Description: Physical Therapy Goals  Initiated 8/20/2020 and to be accomplished within 7 day(s)  1. Patient will roll side to side in bed with minimal assistance. 2.  Patient will  move from supine to sit and sit to supine with minimal assistance. 2.  Patient will transfer from bed to chair and chair to bed with moderate assistance  using the least restrictive device. 3.  Patient will perform sit to stand with moderate assistance . 4. Patient will sit EOB for 10 minutes with moderate assistance. 4.  Patient will ambulate with moderate assistance  for 25 feet with the least restrictive device. PLOF: Pt non-verbal, limited history. Per chart review, patient was living alone. Outcome: Progressing Towards Goal     PHYSICAL THERAPY TREATMENT    Patient: Latonia العلي (17 y.o. female)  Date: 8/24/2020  Diagnosis: Cellulitis [L03.90]  Cerebrovascular accident (CVA) ([address] [I63.9]  Elevated troponin I level [R79.89]  Cerebrovascular accident (CVA) ([address] [I63.9]  CVA (cerebral vascular accident) ([address] [I63.9]   <principal problem not specified>  Procedure(s) (LRB):  VENA CAVA FILTER INSERTION (Right) 4 Days Post-Op  Precautions: Fall, Contact, Skin  PLOF: see above    ASSESSMENT:  Pt cleared for PT treatment session per nursing and co-treatment performed with OT to maximize pt safety and participation throughout functional mobility. Pt received in supine with HOB elevated, IV and NGT intact, and agreeable to treatment session. Pt soiled with large BM and urine upon arrival. Pt required for max A to roll left and min A to roll right for bowel hygiene. Max encouragement and max A x2 for supine to sit. Tolerated ~10 min sitting EOB to orient to midline, WB on B hands, and attempt instruction in seated TherEx.  Fatigue noted and pt returned to supine with mod A x2, max A x2 for scooting to [facility] for optimal bed positioning. Pt left in supine with HOB elevated, IV and NGT intact, and all needs met/within reach. Progression toward goals:   []      Improving appropriately and progressing toward goals  [x]      Improving slowly and progressing toward goals  []      Not making progress toward goals and plan of care will be adjusted     PLAN:  Patient continues to benefit from skilled intervention to address the above impairments. Continue treatment per established plan of care. Discharge Recommendations:  Rehab  Further Equipment Recommendations for Discharge:  TBD pending progress     SUBJECTIVE:   Patient stated I'm alright.  when laying on right side. OBJECTIVE DATA SUMMARY:   Critical Behavior:  Neurologic State: Alert  Orientation Level: Oriented to person, Oriented to place, Unable to verbalize  Cognition: Follows commands  Safety/Judgement: Fall prevention  Functional Mobility Training:  Bed Mobility:  Rolling: Maximum assistance(Max A > L, Min A > R)  Supine to Sit: Maximum assistance;Assist x2  Sit to Supine: Moderate assistance;Assist x2  Scooting: Maximum assistance;Assist x2  Balance:  Sitting: Impaired  Sitting - Static: Fair (occasional)(fair/fair minus)     Pain:  Pain level pre-treatment: Did not rate  Pain level post-treatment:\" \"    Activity Tolerance:   Fair-  Please refer to the flowsheet for vital signs taken during this treatment. After treatment:   [] Patient left in no apparent distress sitting up in chair  [x] Patient left in no apparent distress in bed  [x] Call bell left within reach  [x] Nursing notified  [] Caregiver present  [] Bed alarm activated  [] SCDs applied      COMMUNICATION/EDUCATION:   [x]         Role of Physical Therapy in the acute care setting. [x]         Fall prevention education was provided and the patient/caregiver indicated understanding. [x]         Patient/family have participated as able in working toward goals and plan of care.   []         Patient/family agree to work toward stated goals and plan of care. []         Patient understands intent and goals of therapy, but is neutral about his/her participation.   []         Patient is unable to participate in stated goals/plan of care: ongoing with therapy staff.  []         Other:        Jono Neal, PTA   Time Calculation: 33 mins

## 2020-08-24 NOTE — PROGRESS NOTES
[facility]U/IPR REFERRAL CONTACT NOTE  [id number] [place] [address] for Physical Rehabilitation    RE: Daksha Iyer   Thank you for the opportunity to review this patient's case for admission to [id number] [place] [address] for Physical Rehabilitation. Based on our pre-admission screening:     [x ] Our Team/Medical Director is following this case. Comments: Patient is very low level with therapy at this time and continues to require a NGT for nutrition and hydration. Will continue to follow for medical stability and therapy progress. Again, Thank you for this referral. Should you have any questions please do not hesitate to call. Sincerely,  Hitesh Velez. Afia Najera, [id number]   Afia Najera, RN  Admissions [facility] for Physical Rehabilitation  [phone]

## 2020-08-24 NOTE — CONSULTS
[email or web address]  [phone]    GASTROENTEROLOGY CONSULT      Impression:   1. Severe dysphagia due to CVA, failed MBS, alternate means of nutrition recommended, patient agrees with PEG  2. Aspiration PNA  3. R sided weakness due to left MCA CVA  4. PE and bilateral LE DVT s/p IVC filter  5. HTN  6. Leukocytosis due to #2 and #4, improving       Plan:     1. Will plan for PEG placement tomorrow  2. Hold tube feeds after midnight  3. Hold anticoagulation prior to procedure  4. Medical management per primary team      Chief Complaint: Severe dysphagia, aspiration      HPI:  Jason Smith is a 60 y.o. female who I am being asked to see in consultation for an opinion regarding the above. She presents with aspiration PNA likely due to severe dysphagia associated with CVA. She has failed MBS with speech, currently on NGT feeds at this time. Complains of upper abdominal pain. Had large BM today, no melena or BRBPR. PMH:   Past Medical History:   Diagnosis Date    Hypertension        PSH:   History reviewed. No pertinent surgical history.     Social HX:   Social History     Socioeconomic History    Marital status:      Spouse name: Not on file    Number of children: Not on file    Years of education: Not on file    Highest education level: Not on file   Occupational History    Not on file   Social Needs    Financial resource strain: Not on file    Food insecurity     Worry: Not on file     Inability: Not on file   [employer] needs     Medical: Not on file     Non-medical: Not on file   Tobacco Use    Smoking status: Current Every Day Smoker     Packs/day: 1.00    Smokeless tobacco: Never Used   Substance and Sexual Activity    Alcohol use: Yes     Comment: socially    Drug use: Never    Sexual activity: Not on file   Lifestyle    Physical activity     Days per week: Not on file     Minutes per session: Not on file    Stress: Not on file   Relationships    Social connections     Talks on phone: Not on file     Gets together: Not on file     Attends Yazidi service: Not on file     Active member of club or organization: Not on file     Attends meetings of clubs or organizations: Not on file     Relationship status: Not on file    Intimate partner violence     Fear of current or ex partner: Not on file     Emotionally abused: Not on file     Physically abused: Not on file     Forced sexual activity: Not on file   Other Topics Concern    Not on file   Social History Narrative    Not on file       FHX:   History reviewed. No pertinent family history. Allergy:   Allergies   Allergen Reactions    Benadryl [Diphenhydramine Hcl] Hives       Patient Active Problem List   Diagnosis Code    Cellulitis L03.90    Elevated troponin I level R79.89    Cerebrovascular accident ([facility]A) ([facility] I63.9    CVA (cerebral vascular accident) ([facility] I63.9       Home Medications:     Medications Prior to Admission   Medication Sig    ondansetron hcl (ZOFRAN) 4 mg tablet Take 2 Tabs by mouth every eight (8) hours as needed for Nausea. Review of Systems:     Constitutional: No fevers, chills, weight loss, fatigue. Skin: No rashes, pruritis, jaundice, ulcerations, erythema. HENT: No headaches, nosebleeds, sinus pressure, rhinorrhea, sore throat. Eyes: No visual changes, blurred vision, eye pain, photophobia, jaundice. Cardiovascular: No chest pain, heart palpitations. Respiratory: No cough, SOB, wheezing, chest discomfort, orthopnea. Gastrointestinal: Dysphagia   Genitourinary: No dysuria, bleeding, discharge, pyuria. Musculoskeletal: Weakness - right sided   Endo: No sweats. Heme: No bruising, easy bleeding. Allergies: As noted. Neurological: Cranial nerves intact. Alert and oriented. Gait not assessed. Psychiatric:  No anxiety, depression, hallucinations.           Visit Vitals  BP (!) 141/101 (BP 1 Location: Left arm, BP Patient Position: At rest)   Pulse 85   Temp 97.6 °F (36.4 °C)   Resp 18   Ht 5' 3\" (1.6 m)   Wt 73.5 kg (162 lb 2.2 oz)   SpO2 90%   BMI 28.72 kg/m²       Physical Assessment:     constitutional: appearance: well developed, well nourished, normal habitus, no deformities, in no acute distress. skin: inspection: no rashes, ulcers, icterus or other lesions; no clubbing or telangiectasias. palpation: no induration or subcutaneos nodules. eyes: inspection: normal conjunctivae and lids; no jaundice pupils: normal  ENMT: mouth: normal oral mucosa,lips and gums; good dentition. oropharynx: normal tongue, hard and soft palate; posterior pharynx without erithema, exudate or lesions. NGT in place  neck: thyroid: normal size, consistency and position; no masses or tenderness. respiratory: effort: normal chest excursion; no intercostal retraction or accessory muscle use. cardiovascular: abdominal aorta: normal size and position; no bruits. palpation: PMI of normal size and position; normal rhythm; no thrill or murmurs. abdominal: abdomen: normal consistency; no tenderness or masses. hernias: no hernias appreciated. liver: normal size and consistency. spleen: not palpable. rectal: hemoccult/guaiac: not performed. musculoskeletal: R sided weakness due to CVA, gait not assessed, sitting up in bed, PT/OT staff present  neurologic: cranial nerves: s/p L sided CVA   psychiatric: judgement/insight: within normal limits. memory: within normal limits for recent and remote events. mood and affect: no evidence of depression, anxiety or agitation. orientation: oriented to time, space and person.         Basic Metabolic Profile   Recent Labs     08/24/20  0514   *   K 3.8   *   CO2 28   BUN 14   *   CA 8.3*   MG 2.4         CBC w/Diff    Recent Labs     08/24/20  0514   WBC 15.8*   RBC 4.41   HGB 12.3   HCT 38.9   MCV 88.2   MCH 27.9   MCHC 31.6   RDW 15.7*       Recent Labs     08/24/20  0514   GRANS 78*   LYMPH 11*   EOS 0        Hepatic Function   No results for input(s): ALB, TP, TBILI, AP, AML, LPSE in the last 72 hours. No lab exists for component: DBILI, GPT, SGOT     Coags   Recent Labs     08/24/20  0514 08/23/20  0937   APTT 135.2* 95.5*           SALMA Trinidad. [facility] 1945, [address] [facility]  Cell: [phone]  [email or web address]

## 2020-08-24 NOTE — PROGRESS NOTES
Problem: Self Care Deficits Care Plan (Adult)  Goal: *Acute Goals and Plan of Care (Insert Text)  Description: Occupational Therapy Goals  Initiated 8/20/2020 within 7 day(s). 1.  Patient will perform grooming with supervision/set-up using LUE. 2.  Patient will perform bed mobility in preparation for self-care with moderate assistance . 3. Patient will follow 70% of commands throughout self-care tasks with minimal cues. 4.  Patient will participate in upper extremity therapeutic exercise/activities with minimal assistance/contact guard assist for 8 minutes. 5.  Patient will perform self-feeding with minimal assistance/contact guard assistance. Prior Level of Function: Patient non-verbal, nodded to some questions, not all, eyes closed throughout evaluation     Outcome: Progressing Towards Goal   OCCUPATIONAL THERAPY TREATMENT    Patient: Hector Fowler (86 y.o. female)  Date: 8/24/2020  Diagnosis: Cellulitis [L03.90]  Cerebrovascular accident (CVA) ([address] [I63.9]  Elevated troponin I level [R79.89]  Cerebrovascular accident (CVA) ([facility] [address] [I63.9]  CVA (cerebral vascular accident) ([facility] [address] [I63.9]   <principal problem not specified>  Procedure(s) (LRB):  VENA CAVA FILTER INSERTION (Right) 4 Days Post-Op  Precautions: Fall, Contact, Skin    Chart, occupational therapy assessment, plan of care, and goals were reviewed. ASSESSMENT:  Pt co-treated w/PT to maximize safety w/EOB activity. Pt w/expressive aphasia, responds to yes/no questions appropriately nodding head. Pt soiled upon entry, requiring Max/Total Assist for toileting ADL at bed level. RUE flaccid, no shoulder shrug. Performed NMR activity in prep for UE TherEx and ADL grooming tasks at bed level. Pt requires max encouragement for EOB activity, tolerating ~ 10 minutes sitting EOB. Pt requires min verbal/tactile cues to maintain midline 2/2 L lateral lean.  Pt fatigues easily, requiring return to supine w/2 person assist. Pt left w/all items within reach. Pt may benefit from communication board. Progression toward goals:  []          Improving appropriately and progressing toward goals  [x]          Improving slowly and progressing toward goals  []          Not making progress toward goals and plan of care will be adjusted     PLAN:  Patient continues to benefit from skilled intervention to address the above impairments. Continue treatment per established plan of care. Discharge Recommendations:  Skilled Nursing Facility  Further Equipment Recommendations for Discharge:  walker as determined by  PT and wheelchair      SUBJECTIVE:   Patient stated no.     OBJECTIVE DATA SUMMARY:   Cognitive/Behavioral Status:  Neurologic State: Alert  Orientation Level: Oriented to person, Oriented to place, Unable to verbalize  Cognition: Follows commands  Safety/Judgement: Fall prevention    Functional Mobility and Transfers for ADLs:   Bed Mobility:  Rolling: Maximum assistance(Max A > L, Min A > R)  Supine to Sit: Maximum assistance;Assist x2  Sit to Supine: Moderate assistance;Assist x2  Scooting: Maximum assistance;Assist x2     Balance:  Sitting: Impaired  Sitting - Static: Fair (occasional)(fair/fair minus)    ADL Intervention:  Grooming  Position Performed: Other (comment)(supine w/HOB raised)  Washing Face: Stand-by assistance  Washing Hands: Maximum assistance(w/hand over hand assist)    Toileting  Toileting Assistance: Maximum assistance; Total assistance(dependent)  Bladder Hygiene: Maximum assistance  Bowel Hygiene: Total assistance (dependent)  Clothing Management: Maximum assistance    Neuro Re-Education:  Tapping RUE in prep for UE TherEx and ADL grooming tasks  Weight bearing RUE, seated EOB    UE Therapeutic Exercises:   PROM RUE elbow flexion/extension    Pain:  Pain level pre-treatment: 0/10   Pain level post-treatment: 0/10    Activity Tolerance:    Fair    Please refer to the flowsheet for vital signs taken during this treatment.   After treatment: []  Patient left in no apparent distress sitting up in chair  [x]  Patient left in no apparent distress in bed  [x]  Call bell left within reach  []  Nursing notified  []  Caregiver present  []  Bed alarm activated    COMMUNICATION/EDUCATION:   [] Role of Occupational Therapy in the acute care setting  [] Home safety education was provided and the patient/caregiver indicated understanding. [] Patient/family have participated as able in working towards goals and plan of care. [] Patient/family agree to work toward stated goals and plan of care. [x] Patient understands intent and goals of therapy, but is neutral about his/her participation. [] Patient is unable to participate in goal setting and plan of care.       Thank you for this referral.  [facility] YRIS Traylor  Time Calculation: 32 mins

## 2020-08-24 NOTE — PROGRESS NOTES
CM Chart Review:  Right sided weakness due to left CVA. Dysphagia due to stroke. Per notes patient more alert today, previous reported patient was non verbal, now noted hard to understand her speech. Patient has NGT and tube feeding. Per notes, Speech therapy to evaluate the need for a Peg tube today. Brant Martin in Acute Rehab Unit is following this patient.       Donna Cohn RN  Case Management [phone]

## 2020-08-24 NOTE — PROGRESS NOTES
8/24/2020 4:18 PM    SSN: [id number]    Subjective:   59 y/o female admitted 8/19 after being found down with a right sided hemiplegia and an aphasia. CT head showed a large acute left MCA stroke with some mass effect. CTA was normal, but showed a moderate acute PE incidentally. MRI confirmed infarction. She has remained lethargic, with a dense right hemiplegia and aphasia. On heparin for DVT, s/p IVC filter. Has been in NSR on telemetry. Social History     Socioeconomic History    Marital status:      Spouse name: Not on file    Number of children: Not on file    Years of education: Not on file    Highest education level: Not on file   Occupational History    Not on file   Social Needs    Financial resource strain: Not on file    Food insecurity     Worry: Not on file     Inability: Not on file    Transportation needs     Medical: Not on file     Non-medical: Not on file   Tobacco Use    Smoking status: Current Every Day Smoker     Packs/day: 1.00    Smokeless tobacco: Never Used   Substance and Sexual Activity    Alcohol use: Yes     Comment: socially    Drug use: Never    Sexual activity: Not on file   Lifestyle    Physical activity     Days per week: Not on file     Minutes per session: Not on file    Stress: Not on file   Relationships    Social connections     Talks on phone: Not on file     Gets together: Not on file     Attends Rastafari service: Not on file     Active member of club or organization: Not on file     Attends meetings of clubs or organizations: Not on file     Relationship status: Not on file    Intimate partner violence     Fear of current or ex partner: Not on file     Emotionally abused: Not on file     Physically abused: Not on file     Forced sexual activity: Not on file   Other Topics Concern    Not on file   Social History Narrative    Not on file       History reviewed. No pertinent family history.     Current Facility-Administered Medications   Medication Dose Route Frequency Provider Last Rate Last Dose    [START ON 8/25/2020] aspirin tablet 325 mg  325 mg Per G Tube DAILY Ayo Benito MD        0.9% NaCl bacteriostatic (NORMAL SALINE) 0.9 % injection 10 mL  10 mL IntraVENous CARD ONCE Jovani Malin MD        amLODIPine (NORVASC) tablet 5 mg  5 mg Oral DAILY Melissa Shepherd, NP   5 mg at 08/24/20 0935    ipratropium-albuterol (COMBIVENT RESPIMAT) 20 mcg-100 mcg inhalation spray  1 Puff Inhalation Q4H RT Ayo Benito MD   Stopped at 08/24/20 0800    dextrose 5% with KCl 20 mEq/L infusion   IntraVENous CONTINUOUS Ayo Benito MD 50 mL/hr at 08/24/20 1127      heparin (porcine) 25,000 units in 0.45% saline 250 ml infusion  18-36 Units/kg/hr IntraVENous TITRATE Ayo Benito MD 13.7 mL/hr at 08/24/20 1000 22 Units/kg/hr at 08/24/20 1000    piperacillin-tazobactam (ZOSYN) 3.375 g in 0.9% sodium chloride (MBP/ADV) 100 mL MBP  3.375 g IntraVENous Q6H Reji DIAS  mL/hr at 08/24/20 1036 3.375 g at 08/24/20 1036    sodium chloride (NS) flush 5-40 mL  5-40 mL IntraVENous Q8H Reji Owens MD   10 mL at 08/23/20 2158    sodium chloride (NS) flush 5-40 mL  5-40 mL IntraVENous PRN Mari Khanna MD        docusate sodium (COLACE) capsule 100 mg  100 mg Oral BID PRN Mari Khanna MD        acetaminophen (TYLENOL) suppository 650 mg  650 mg Rectal Q4H PRN Mari Khanna MD        labetaloL (NORMODYNE;TRANDATE) 20 mg/4 mL (5 mg/mL) injection 5 mg  5 mg IntraVENous Q10MIN PRN Reji Owens MD   5 mg at 08/20/20 1218    melatonin tablet 3 mg  3 mg Oral QHS Reji Owens MD   3 mg at 08/23/20 2158    ascorbic acid (vitamin C) (VITAMIN C) tablet 500 mg  500 mg Oral DAILY Reji Owens MD   500 mg at 08/24/20 0935    atorvastatin (LIPITOR) tablet 80 mg  80 mg Oral QHS Lu Goldberg, DO   80 mg at 08/23/20 8236       Past Medical History:   Diagnosis Date    Hypertension        History reviewed. No pertinent surgical history. Allergies   Allergen Reactions    Benadryl [Diphenhydramine Hcl] Hives       Vital signs:    Visit Vitals  /90   Pulse 82   Temp 97.8 °F (36.6 °C)   Resp 20   Ht 5' 3\" (1.6 m)   Wt 73.5 kg (162 lb)   SpO2 100%   BMI 28.70 kg/m²       Review of Systems:   unobtainable    EXAM: lethargic. Heart is regular. Aphasic. MS can't be further evaluated d/t aphasia. Left gaze pref. PERRL, right lower facial droop. Flaccid RUE/RLE, power testing non cooperative. DTR's +3 on right. gait deferred, sensory not possible, no tremors. MRI brain 8/23 personally reviewed, showing large left MCA early subacute infarct corresponding to findings on comparison  head CT. Small area of petechial hemorrhage in the ventral aspect of the infarct but no focal hematoma. Chronic lacunar infarcts in the left and right thalami. Mild to moderate chronic microvascular ischemic changes in the cerebral white matter. A few scattered chronic microbleeds with nonspecific distribution. Suspect changes related to chronic hypertension. Echo pending    CTA was normal.     On NSR on telemetry. Recent Results (from the past 24 hour(s))   PTT    Collection Time: 08/24/20  5:14 AM   Result Value Ref Range    aPTT 135.2 (H) 23.0 - 36.4 SEC   CBC WITH AUTOMATED DIFF    Collection Time: 08/24/20  5:14 AM   Result Value Ref Range    WBC 15.8 (H) 4.6 - 13.2 K/uL    RBC 4.41 4.20 - 5.30 M/uL    HGB 12.3 12.0 - 16.0 g/dL    HCT 38.9 35.0 - 45.0 %    MCV 88.2 74.0 - 97.0 FL    MCH 27.9 24.0 - 34.0 PG    MCHC 31.6 31.0 - 37.0 g/dL    RDW 15.7 (H) 11.6 - 14.5 %    PLATELET 388 035 - 200 K/uL    MPV 12.2 (H) 9.2 - 11.8 FL    NEUTROPHILS 78 (H) 40 - 73 %    LYMPHOCYTES 11 (L) 21 - 52 %    MONOCYTES 11 (H) 3 - 10 %    EOSINOPHILS 0 0 - 5 %    BASOPHILS 0 0 - 2 %    ABS.  NEUTROPHILS 12.2 (H) 1.8 - 8.0 K/UL ABS. LYMPHOCYTES 1.8 0.9 - 3.6 K/UL    ABS. MONOCYTES 1.7 (H) 0.05 - 1.2 K/UL    ABS. EOSINOPHILS 0.1 0.0 - 0.4 K/UL    ABS. BASOPHILS 0.0 0.0 - 0.1 K/UL    DF AUTOMATED     METABOLIC PANEL, BASIC    Collection Time: 08/24/20  5:14 AM   Result Value Ref Range    Sodium 146 (H) 136 - 145 mmol/L    Potassium 3.8 3.5 - 5.5 mmol/L    Chloride 114 (H) 100 - 111 mmol/L    CO2 28 21 - 32 mmol/L    Anion gap 4 3.0 - 18 mmol/L    Glucose 175 (H) 74 - 99 mg/dL    BUN 14 7.0 - 18 MG/DL    Creatinine 0.76 0.6 - 1.3 MG/DL    BUN/Creatinine ratio 18 12 - 20      GFR est AA >60 >60 ml/min/1.73m2    GFR est non-AA >60 >60 ml/min/1.73m2    Calcium 8.3 (L) 8.5 - 10.1 MG/DL   MAGNESIUM    Collection Time: 08/24/20  5:14 AM   Result Value Ref Range    Magnesium 2.4 1.6 - 2.6 mg/dL         Assessment/Plan: Large left MCA stroke, suspect was cardioembolic, no underlying proof on telemetry. Will order repeat CT for tomorrow. If anticoagulation is needed because of the PE, recommend waiting at least 7 days after stroke (8/26). Otherwise for now recommend continuation of aspirin, will make recs for dual antiplatelet therapy later during admission course and after reviewing repeat CT head. Probable good rehab candidate as well. Will follow. PLEASE NOTE:   Portions of this document may have been produced using voice recognition software. Unrecognized errors in transcription may be present. This note will not be viewable in [address]

## 2020-08-24 NOTE — PROGRESS NOTES
Infectious Disease progress Note        Reason: Acute pulmonary embolism, suspected COVID-19 infection    Current abx Prior abx   Azithromycin since 8/19-8/20  Zosyn since 8/20 Piperacillin/tazobactam 8/19     Lines:       Assessment :     60 y.o.  female  with past medical history significant for hypertension who presented to ED on 8/19/2020 with significant new onset weakness to right side of body. CT head: Acute/subacute large nonhemorrhagic left MCA territory infarction. CTA head and neck 8/19-moderate-sized pulmonary embolism. CXR 8/19- RML infiltrate    Now with worsening leukocytosis. Clinical presentation consistent with aspiration pneumonia in a patient with acute pulmonary embolism, CVA. Worsening leukocytosis likely secondary to aspiration pneumonia along with leukemoid reaction to acute pulmonary embolism. Negative rapid covid test 8/20/2020,negative [facility] covid test 8/20    Clinically better. More alert. Attempts to communicate. Recommendations:    1. continue piperacillin/tazobactam (d5)  2. Agree with plans for peg  3. Needs frequent suctioning-discussed with nursing  4. Management of pulmonary embolism per primary team  5. Management of CVA per neurology  6. Will switch to abx via NG tube/peg in 1-2 days if continued improvement. Above plan was discussed in details with patient, RN and dr Ronni Mendiola. Please call me if any further questions or concerns. Will continue to participate in the care of this patient. HPI:  Unable to communicate effectively. Detailed review of system not feasible    History reviewed. No pertinent surgical history. Home Medication List      Does not communicate. Detailed review of system not feasible Details   ondansetron hcl (ZOFRAN) 4 mg tablet Take 2 Tabs by mouth every eight (8) hours as needed for Nausea.   Qty: 12 Tab, Refills: 0             Current Facility-Administered Medications   Medication Dose Route Frequency    amLODIPine (NORVASC) tablet 5 mg  5 mg Oral DAILY    ipratropium-albuterol (COMBIVENT RESPIMAT) 20 mcg-100 mcg inhalation spray  1 Puff Inhalation Q4H RT    dextrose 5% with KCl 20 mEq/L infusion   IntraVENous CONTINUOUS    heparin (porcine) 25,000 units in 0.45% saline 250 ml infusion  18-36 Units/kg/hr IntraVENous TITRATE    piperacillin-tazobactam (ZOSYN) 3.375 g in 0.9% sodium chloride (MBP/ADV) 100 mL MBP  3.375 g IntraVENous Q6H    aspirin (ASA) suppository 300 mg  300 mg Rectal DAILY    sodium chloride (NS) flush 5-40 mL  5-40 mL IntraVENous Q8H    sodium chloride (NS) flush 5-40 mL  5-40 mL IntraVENous PRN    docusate sodium (COLACE) capsule 100 mg  100 mg Oral BID PRN    acetaminophen (TYLENOL) suppository 650 mg  650 mg Rectal Q4H PRN    labetaloL (NORMODYNE;TRANDATE) 20 mg/4 mL (5 mg/mL) injection 5 mg  5 mg IntraVENous Q10MIN PRN    melatonin tablet 3 mg  3 mg Oral QHS    ascorbic acid (vitamin C) (VITAMIN C) tablet 500 mg  500 mg Oral DAILY    atorvastatin (LIPITOR) tablet 80 mg  80 mg Oral QHS       Allergies: Benadryl [diphenhydramine hcl]    Temp (24hrs), Av.5 °F (36.9 °C), Min:98.2 °F (36.8 °C), Max:98.9 °F (37.2 °C)    Visit Vitals  BP (!) 149/106 (BP 1 Location: Left arm, BP Patient Position: At rest)   Pulse 86   Temp 98.2 °F (36.8 °C)   Resp 20   Ht 5' 3\" (1.6 m)   Wt 73.5 kg (162 lb 2.2 oz)   SpO2 98%   BMI 28.72 kg/m²       ROS: unable to obtain    Physical Exam:    General:  Alert, cooperative, no acute distress    Pulmonary: wet sounds from upper airway, no wheezing  Cardiovascular: Regular rate and Rhythm. GI:  Soft, Non distended, Non tender. + Bowel sounds. Extremities:  No edema. Psych: Good insight. Not anxious or agitated.   Neurologic: Alert awake, a phasic, motor strength left side 5/5, motor strength right side 0/5    Labs: Results:   Chemistry Recent Labs     20  0514 20  0019 20  0322   * 133* 91   * 151* 145   K 3.8 3. 1* 3.4*   * 117* 111   CO2 28 28 29   BUN 14 18 22*   CREA 0.76 0.94 1.20   CA 8.3* 8.1* 8.6   AGAP 4 6 5   BUCR 18 19 18      CBC w/Diff Recent Labs     08/24/20  0514 08/23/20  0019 08/22/20  0322   WBC 15.8* 17.4* 20.5*   RBC 4.41 4.89 4.85   HGB 12.3 13.4 13.7   HCT 38.9 42.3 42.1    208 197   GRANS 78* 83* 77*   LYMPH 11* 8* 12*   EOS 0 0 0      Microbiology No results for input(s): CULT in the last 72 hours.        RADIOLOGY:    All available imaging studies/reports in [facility] for this admission were reviewed    Dr. Gorge Melo, Infectious Disease Specialist  [phone]  August 24, 2020  2:28 PM

## 2020-08-25 ENCOUNTER — ANESTHESIA (OUTPATIENT)
Dept: ENDOSCOPY | Age: 65
DRG: 040 | End: 2020-08-25
Payer: COMMERCIAL

## 2020-08-25 ENCOUNTER — APPOINTMENT (OUTPATIENT)
Dept: CT IMAGING | Age: 65
DRG: 040 | End: 2020-08-25
Attending: PSYCHIATRY & NEUROLOGY
Payer: COMMERCIAL

## 2020-08-25 ENCOUNTER — ANESTHESIA EVENT (OUTPATIENT)
Dept: ENDOSCOPY | Age: 65
DRG: 040 | End: 2020-08-25
Payer: COMMERCIAL

## 2020-08-25 LAB
ANION GAP SERPL CALC-SCNC: 5 MMOL/L (ref 3–18)
BASOPHILS # BLD: 0 K/UL (ref 0–0.1)
BASOPHILS NFR BLD: 0 % (ref 0–2)
BUN SERPL-MCNC: 12 MG/DL (ref 7–18)
BUN/CREAT SERPL: 16 (ref 12–20)
CALCIUM SERPL-MCNC: 8.7 MG/DL (ref 8.5–10.1)
CHLORIDE SERPL-SCNC: 109 MMOL/L (ref 100–111)
CO2 SERPL-SCNC: 29 MMOL/L (ref 21–32)
CREAT SERPL-MCNC: 0.74 MG/DL (ref 0.6–1.3)
DIFFERENTIAL METHOD BLD: ABNORMAL
EOSINOPHIL # BLD: 0.1 K/UL (ref 0–0.4)
EOSINOPHIL NFR BLD: 1 % (ref 0–5)
ERYTHROCYTE [DISTWIDTH] IN BLOOD BY AUTOMATED COUNT: 15.8 % (ref 11.6–14.5)
GLUCOSE BLD STRIP.AUTO-MCNC: 100 MG/DL (ref 70–110)
GLUCOSE BLD STRIP.AUTO-MCNC: 94 MG/DL (ref 70–110)
GLUCOSE SERPL-MCNC: 96 MG/DL (ref 74–99)
HCT VFR BLD AUTO: 40 % (ref 35–45)
HGB BLD-MCNC: 12.4 G/DL (ref 12–16)
INR PPP: 1.1 (ref 0.8–1.2)
LYMPHOCYTES # BLD: 2.3 K/UL (ref 0.9–3.6)
LYMPHOCYTES NFR BLD: 19 % (ref 21–52)
MCH RBC QN AUTO: 27.3 PG (ref 24–34)
MCHC RBC AUTO-ENTMCNC: 31 G/DL (ref 31–37)
MCV RBC AUTO: 88.1 FL (ref 74–97)
MONOCYTES # BLD: 1.5 K/UL (ref 0.05–1.2)
MONOCYTES NFR BLD: 13 % (ref 3–10)
NEUTS SEG # BLD: 8.1 K/UL (ref 1.8–8)
NEUTS SEG NFR BLD: 67 % (ref 40–73)
PLATELET # BLD AUTO: 237 K/UL (ref 135–420)
PMV BLD AUTO: 12.2 FL (ref 9.2–11.8)
POTASSIUM SERPL-SCNC: 3.9 MMOL/L (ref 3.5–5.5)
PROTHROMBIN TIME: 14.1 SEC (ref 11.5–15.2)
RBC # BLD AUTO: 4.54 M/UL (ref 4.2–5.3)
SODIUM SERPL-SCNC: 143 MMOL/L (ref 136–145)
WBC # BLD AUTO: 12.1 K/UL (ref 4.6–13.2)

## 2020-08-25 PROCEDURE — 36415 COLL VENOUS BLD VENIPUNCTURE: CPT

## 2020-08-25 PROCEDURE — 74011000258 HC RX REV CODE- 258: Performed by: INTERNAL MEDICINE

## 2020-08-25 PROCEDURE — 74011000250 HC RX REV CODE- 250: Performed by: NURSE ANESTHETIST, CERTIFIED REGISTERED

## 2020-08-25 PROCEDURE — 77030005122 HC CATH GASTMY PEG BSC -B: Performed by: INTERNAL MEDICINE

## 2020-08-25 PROCEDURE — 94761 N-INVAS EAR/PLS OXIMETRY MLT: CPT

## 2020-08-25 PROCEDURE — 85025 COMPLETE CBC W/AUTO DIFF WBC: CPT

## 2020-08-25 PROCEDURE — 85610 PROTHROMBIN TIME: CPT

## 2020-08-25 PROCEDURE — 74011250636 HC RX REV CODE- 250/636: Performed by: NURSE ANESTHETIST, CERTIFIED REGISTERED

## 2020-08-25 PROCEDURE — 74011250636 HC RX REV CODE- 250/636: Performed by: INTERNAL MEDICINE

## 2020-08-25 PROCEDURE — 82962 GLUCOSE BLOOD TEST: CPT

## 2020-08-25 PROCEDURE — 74011250637 HC RX REV CODE- 250/637: Performed by: INTERNAL MEDICINE

## 2020-08-25 PROCEDURE — 65270000029 HC RM PRIVATE

## 2020-08-25 PROCEDURE — 70450 CT HEAD/BRAIN W/O DYE: CPT

## 2020-08-25 PROCEDURE — 77010033678 HC OXYGEN DAILY

## 2020-08-25 PROCEDURE — 76040000019: Performed by: INTERNAL MEDICINE

## 2020-08-25 PROCEDURE — 76060000031 HC ANESTHESIA FIRST 0.5 HR: Performed by: INTERNAL MEDICINE

## 2020-08-25 PROCEDURE — 0DH63UZ INSERTION OF FEEDING DEVICE INTO STOMACH, PERCUTANEOUS APPROACH: ICD-10-PCS | Performed by: INTERNAL MEDICINE

## 2020-08-25 PROCEDURE — 94640 AIRWAY INHALATION TREATMENT: CPT

## 2020-08-25 PROCEDURE — 80048 BASIC METABOLIC PNL TOTAL CA: CPT

## 2020-08-25 RX ORDER — AMLODIPINE BESYLATE 5 MG/1
5 TABLET ORAL DAILY
Status: DISCONTINUED | OUTPATIENT
Start: 2020-08-26 | End: 2020-08-26

## 2020-08-25 RX ORDER — GUAIFENESIN 100 MG/5ML
81 LIQUID (ML) ORAL DAILY
Status: DISCONTINUED | OUTPATIENT
Start: 2020-08-26 | End: 2020-08-29 | Stop reason: HOSPADM

## 2020-08-25 RX ORDER — WARFARIN SODIUM 5 MG/1
5 TABLET ORAL EVERY EVENING
Status: COMPLETED | OUTPATIENT
Start: 2020-08-25 | End: 2020-08-27

## 2020-08-25 RX ORDER — SODIUM CHLORIDE, SODIUM LACTATE, POTASSIUM CHLORIDE, CALCIUM CHLORIDE 600; 310; 30; 20 MG/100ML; MG/100ML; MG/100ML; MG/100ML
50 INJECTION, SOLUTION INTRAVENOUS CONTINUOUS
Status: CANCELLED | OUTPATIENT
Start: 2020-08-25

## 2020-08-25 RX ORDER — LIDOCAINE HYDROCHLORIDE 20 MG/ML
INJECTION, SOLUTION EPIDURAL; INFILTRATION; INTRACAUDAL; PERINEURAL AS NEEDED
Status: DISCONTINUED | OUTPATIENT
Start: 2020-08-25 | End: 2020-08-25 | Stop reason: HOSPADM

## 2020-08-25 RX ORDER — SODIUM CHLORIDE 0.9 % (FLUSH) 0.9 %
5-40 SYRINGE (ML) INJECTION AS NEEDED
Status: CANCELLED | OUTPATIENT
Start: 2020-08-25

## 2020-08-25 RX ORDER — LANOLIN ALCOHOL/MO/W.PET/CERES
3 CREAM (GRAM) TOPICAL
Status: DISCONTINUED | OUTPATIENT
Start: 2020-08-25 | End: 2020-08-29 | Stop reason: HOSPADM

## 2020-08-25 RX ORDER — ATORVASTATIN CALCIUM 40 MG/1
80 TABLET, FILM COATED ORAL
Status: DISCONTINUED | OUTPATIENT
Start: 2020-08-25 | End: 2020-08-29 | Stop reason: HOSPADM

## 2020-08-25 RX ORDER — CARVEDILOL 6.25 MG/1
3.12 TABLET ORAL EVERY 12 HOURS
Status: DISCONTINUED | OUTPATIENT
Start: 2020-08-25 | End: 2020-08-25

## 2020-08-25 RX ORDER — PROPOFOL 10 MG/ML
INJECTION, EMULSION INTRAVENOUS AS NEEDED
Status: DISCONTINUED | OUTPATIENT
Start: 2020-08-25 | End: 2020-08-25 | Stop reason: HOSPADM

## 2020-08-25 RX ORDER — CARVEDILOL 6.25 MG/1
6.25 TABLET ORAL EVERY 12 HOURS
Status: DISCONTINUED | OUTPATIENT
Start: 2020-08-25 | End: 2020-08-29 | Stop reason: HOSPADM

## 2020-08-25 RX ORDER — LISINOPRIL 5 MG/1
5 TABLET ORAL DAILY
Status: DISCONTINUED | OUTPATIENT
Start: 2020-08-25 | End: 2020-08-25

## 2020-08-25 RX ORDER — SODIUM CHLORIDE 0.9 % (FLUSH) 0.9 %
5-40 SYRINGE (ML) INJECTION EVERY 8 HOURS
Status: CANCELLED | OUTPATIENT
Start: 2020-08-25

## 2020-08-25 RX ORDER — SODIUM CHLORIDE, SODIUM LACTATE, POTASSIUM CHLORIDE, CALCIUM CHLORIDE 600; 310; 30; 20 MG/100ML; MG/100ML; MG/100ML; MG/100ML
25 INJECTION, SOLUTION INTRAVENOUS CONTINUOUS
Status: DISCONTINUED | OUTPATIENT
Start: 2020-08-25 | End: 2020-08-25 | Stop reason: HOSPADM

## 2020-08-25 RX ADMIN — PIPERACILLIN SODIUM AND TAZOBACTAM SODIUM 3.38 G: 3; .375 INJECTION, POWDER, LYOPHILIZED, FOR SOLUTION INTRAVENOUS at 19:49

## 2020-08-25 RX ADMIN — Medication 10 ML: at 22:40

## 2020-08-25 RX ADMIN — IPRATROPIUM BROMIDE AND ALBUTEROL 1 PUFF: 20; 100 SPRAY, METERED RESPIRATORY (INHALATION) at 07:54

## 2020-08-25 RX ADMIN — IPRATROPIUM BROMIDE AND ALBUTEROL 1 PUFF: 20; 100 SPRAY, METERED RESPIRATORY (INHALATION) at 21:10

## 2020-08-25 RX ADMIN — PROPOFOL 40 MG: 10 INJECTION, EMULSION INTRAVENOUS at 10:12

## 2020-08-25 RX ADMIN — WARFARIN SODIUM 5 MG: 5 TABLET ORAL at 18:48

## 2020-08-25 RX ADMIN — PROPOFOL 20 MG: 10 INJECTION, EMULSION INTRAVENOUS at 10:15

## 2020-08-25 RX ADMIN — LIDOCAINE HYDROCHLORIDE 60 MG: 20 INJECTION, SOLUTION EPIDURAL; INFILTRATION; INTRACAUDAL; PERINEURAL at 10:18

## 2020-08-25 RX ADMIN — HEPARIN SODIUM 22 UNITS/KG/HR: 10000 INJECTION, SOLUTION INTRAVENOUS at 20:00

## 2020-08-25 RX ADMIN — SODIUM CHLORIDE, SODIUM LACTATE, POTASSIUM CHLORIDE, AND CALCIUM CHLORIDE 25 ML/HR: 600; 310; 30; 20 INJECTION, SOLUTION INTRAVENOUS at 09:25

## 2020-08-25 RX ADMIN — IPRATROPIUM BROMIDE AND ALBUTEROL 1 PUFF: 20; 100 SPRAY, METERED RESPIRATORY (INHALATION) at 04:12

## 2020-08-25 RX ADMIN — PROPOFOL 60 MG: 10 INJECTION, EMULSION INTRAVENOUS at 10:10

## 2020-08-25 RX ADMIN — IPRATROPIUM BROMIDE AND ALBUTEROL 1 PUFF: 20; 100 SPRAY, METERED RESPIRATORY (INHALATION) at 12:42

## 2020-08-25 RX ADMIN — PROPOFOL 20 MG: 10 INJECTION, EMULSION INTRAVENOUS at 10:18

## 2020-08-25 RX ADMIN — PIPERACILLIN SODIUM AND TAZOBACTAM SODIUM 3.38 G: 3; .375 INJECTION, POWDER, LYOPHILIZED, FOR SOLUTION INTRAVENOUS at 06:53

## 2020-08-25 RX ADMIN — DEXTROSE MONOHYDRATE AND POTASSIUM CHLORIDE: 5; .149 INJECTION, SOLUTION INTRAVENOUS at 18:00

## 2020-08-25 RX ADMIN — PIPERACILLIN SODIUM AND TAZOBACTAM SODIUM 3.38 G: 3; .375 INJECTION, POWDER, LYOPHILIZED, FOR SOLUTION INTRAVENOUS at 00:00

## 2020-08-25 RX ADMIN — Medication 10 ML: at 18:48

## 2020-08-25 RX ADMIN — ATORVASTATIN CALCIUM 80 MG: 40 TABLET, FILM COATED ORAL at 22:40

## 2020-08-25 RX ADMIN — CARVEDILOL 6.25 MG: 6.25 TABLET, FILM COATED ORAL at 18:48

## 2020-08-25 NOTE — PROGRESS NOTES
[facility]      CARDIOLOGY PROGRESS NOTE  RECS:      1. Acute CVA- with severe right hemiplegia and aphasia. 2. Dilated Cardiomyopathy with EF 15%-20% and new finding of 1.5 mm x 1.6 mm spherical and hypoechoic thrombus present in the left ventricle- started on Coumadin. Not a candidate for LifeVest at this time due to AMS/severe aphasia and right side hemiplegia. If patient condition improves would consider Lifevest. Telemetry reviewed patient maintaining NSR. no arrhthymias seen    3. Elevate troponin-likely demand ischemia in the setting acute CVA, PE and possible pneumonia-  Continue asa. 4. Elevated D-dimer- and acute PE s/p IVC filter  5. COV-19. Not detected. 6. Right sided pneumonia: Concern for aspiration. Better   7. hypertension-controlled continue current medications. 8. Dysphagia- in the setting #1. Now s/p PEG   9. LBBB- old   8. Hypokalemia- resolved   11. AMS- in the setting #1      Increase carvedilol and add Entresto for cardiomyopathy. Not a candidate for LifeVest due to severe a aphasia and severe hemiplegia. Will not be able to press the buttons and learn how to handle the LifeVest.  As she improves, it can be considered. Continue warfarin for LV clot, DVT and PE. Prognosis is guarded at best.    ASSESSMENT:  Hospital Problems  Never Reviewed          Codes Class Noted POA    Cellulitis ICD-10-CM: L03.90  ICD-9-CM: 682.9  8/19/2020 Unknown        Elevated troponin I level ICD-10-CM: R79.89  ICD-9-CM: 790.6  8/19/2020 Unknown        Cerebrovascular accident (CVA) ([facility] [address] ICD-10-CM: I63.9  ICD-9-CM: 434.91  8/19/2020 Unknown        CVA (cerebral vascular accident) [facility]) ICD-10-CM: I63.9  ICD-9-CM: 434.91  8/19/2020 Unknown                SUBJECTIVE:  No CP or SOB  Unable to communicate severe aphasia. Patient is lethargic has AMS intermittent.  Difficult to assess her level of comprehension         OBJECTIVE:    VS:   Visit Vitals  BP (!) 133/92 (BP 1 Location: Left arm, BP Patient Position: At rest)   Pulse 80   Temp 96.8 °F (36 °C)   Resp 18   Ht 5' 3\" (1.6 m)   Wt 73.5 kg (162 lb)   SpO2 98%   BMI 28.70 kg/m²         Intake/Output Summary (Last 24 hours) at 8/25/2020 1408  Last data filed at 8/25/2020 1026  Gross per 24 hour   Intake 100 ml   Output 600 ml   Net -500 ml     TELE: normal sinus rhythm    General: alert and in no apparent distress. HENT: Normocephalic, atraumatic. Normal external eye. Neck :  no JVD  Cardiac:  regular rate and rhythm, S1, S2 normal, no murmur, click, rub or gallop  Lungs: clear to auscultation bilaterally, diminished breath sounds R base, L base  Abdomen: Soft, nontender, no masses, NG tube  Neuro: right sided weakness. Extremities:  No c/c/e, peripheral pulses present. Labs: Results:       Chemistry Recent Labs     08/25/20  0300 08/24/20  0514 08/23/20  0019   GLU 96 175* 133*    146* 151*   K 3.9 3.8 3.1*    114* 117*   CO2 29 28 28   BUN 12 14 18   CREA 0.74 0.76 0.94   CA 8.7 8.3* 8.1*   AGAP 5 4 6   BUCR 16 18 19      CBC w/Diff Recent Labs     08/25/20  0300 08/24/20  0514 08/23/20  0019   WBC 12.1 15.8* 17.4*   RBC 4.54 4.41 4.89   HGB 12.4 12.3 13.4   HCT 40.0 38.9 42.3    247 208   GRANS 67 78* 83*   LYMPH 19* 11* 8*   EOS 1 0 0      Cardiac Enzymes No results for input(s): CPK, CKND1, EMILY in the last 72 hours. No lab exists for component: CKRMB, TROIP   Coagulation Recent Labs     08/24/20  0514 08/23/20  0937   APTT 135.2* 95.5*       Lipid Panel Lab Results   Component Value Date/Time    Cholesterol, total 153 08/20/2020 08:12 AM    HDL Cholesterol 45 08/20/2020 08:12 AM    LDL, calculated 88.2 08/20/2020 08:12 AM    VLDL, calculated 19.8 08/20/2020 08:12 AM    Triglyceride 99 08/20/2020 08:12 AM    CHOL/HDL Ratio 3.4 08/20/2020 08:12 AM      BNP No results for input(s): BNPP in the last 72 hours. Liver Enzymes No results for input(s): TP, ALB, TBIL, AP in the last 72 hours.     No lab exists for component: SGOT, GPT, DBIL   Thyroid Studies No results found for: T4, T3U, TSH, TSHEXT, TSHEXT             Melsisa E Halecki, NP -C  I have independently evaluated and examined the patient. All relevant labs and testing data are reviewed. Care plan discussed and updated after review.   Javid Hansen MD

## 2020-08-25 NOTE — ANESTHESIA PREPROCEDURE EVALUATION
Relevant Problems   No relevant active problems       Anesthetic History   No history of anesthetic complications            Review of Systems / Medical History  Patient summary reviewed and pertinent labs reviewed    Pulmonary          Pneumonia      Comments: Aspiration pneumonia   PE   Neuro/Psych       CVA  TIA    Comments: Evolving ischemic stroke  Right hemiplegia  Aphasia Cardiovascular    Hypertension: poorly controlled        Dysrhythmias       Exercise tolerance: <4 METS  Comments: LBBB  DVT  PE  EF=15-20%   GI/Hepatic/Renal  Within defined limits              Endo/Other  Within defined limits           Other Findings              Physical Exam    Airway  Mallampati: II  TM Distance: 4 - 6 cm  Neck ROM: normal range of motion   Mouth opening: Normal    Comments: Unable to assess; Pt can't or won't open her mouth Cardiovascular  Regular rate and rhythm,  S1 and S2 normal,  no murmur, click, rub, or gallop             Dental      Comments: Unable to assess   Pulmonary                 Abdominal  GI exam deferred       Other Findings            Anesthetic Plan    ASA: 4  Anesthesia type: MAC  Only monitoring vital signs;   No sedation will be given          Anesthetic plan and risks discussed with: Family and Sibling

## 2020-08-25 NOTE — PROGRESS NOTES
8/25/2020 4:18 PM    SSN: [id number]    Subjective:   59 y/o female admitted 8/19 after being found down with a right sided hemiplegia and an aphasia. CT head showed a large acute left MCA stroke with some mass effect. CTA was normal, but showed a moderate acute PE incidentally. MRI confirmed infarction. She has remained lethargic, with a dense right hemiplegia and aphasia. On heparin for DVT, s/p IVC filter. Has been in NSR on telemetry. Social History     Socioeconomic History    Marital status:      Spouse name: Not on file    Number of children: Not on file    Years of education: Not on file    Highest education level: Not on file   Occupational History    Not on file   Social Needs    Financial resource strain: Not on file    Food insecurity     Worry: Not on file     Inability: Not on file    Transportation needs     Medical: Not on file     Non-medical: Not on file   Tobacco Use    Smoking status: Current Every Day Smoker     Packs/day: 1.00    Smokeless tobacco: Never Used   Substance and Sexual Activity    Alcohol use: Yes     Comment: socially    Drug use: Never    Sexual activity: Not on file   Lifestyle    Physical activity     Days per week: Not on file     Minutes per session: Not on file    Stress: Not on file   Relationships    Social connections     Talks on phone: Not on file     Gets together: Not on file     Attends [employer] service: Not on file     Active member of club or organization: Not on file     Attends meetings of clubs or organizations: Not on file     Relationship status: Not on file    Intimate partner violence     Fear of current or ex partner: Not on file     Emotionally abused: Not on file     Physically abused: Not on file     Forced sexual activity: Not on file   Other Topics Concern    Not on file   Social History Narrative    Not on file       History reviewed. No pertinent family history.     Current Facility-Administered Medications   Medication Dose Route Frequency Provider Last Rate Last Dose    [START ON 8/26/2020] amLODIPine (NORVASC) tablet 5 mg  5 mg Per G Tube DAILY Alessio Moreno MD        atorvastatin (LIPITOR) tablet 80 mg  80 mg Per G Tube QHS Alessio Moreno MD        warfarin (COUMADIN) tablet 5 mg  5 mg Per G Tube QPM Alessio Moreno MD        melatonin tablet 3 mg  3 mg Per G Tube QHS PRN Alessio Moreno MD        [START ON 8/26/2020] aspirin chewable tablet 81 mg  81 mg Per G Tube DAILY Alessio Moreno MD        carvediloL (COREG) tablet 3.125 mg  3.125 mg Per G Tube Q12H Alessio Moreno MD        Warfarin - Physician to dose   Other Q24H Chinmay Ruiz MD        ipratropium-albuterol (COMBIVENT RESPIMAT) 20 mcg-100 mcg inhalation spray  1 Puff Inhalation Q4H RT Alessio Moreno MD   1 Puff at 08/25/20 1242    dextrose 5% with KCl 20 mEq/L infusion   IntraVENous CONTINUOUS Alessio Moreno MD   Stopped at 08/25/20 0925    heparin (porcine) 25,000 units in 0.45% saline 250 ml infusion  18-36 Units/kg/hr IntraVENous TITRATE Alessio Moreno MD   Stopped at 08/24/20 1830    piperacillin-tazobactam (ZOSYN) 3.375 g in 0.9% sodium chloride (MBP/ADV) 100 mL MBP  3.375 g IntraVENous Q6H Galina DIAS  mL/hr at 08/25/20 0653 3.375 g at 08/25/20 0653    sodium chloride (NS) flush 5-40 mL  5-40 mL IntraVENous Q8H Flaco Khanna MD   10 mL at 08/24/20 2203    sodium chloride (NS) flush 5-40 mL  5-40 mL IntraVENous PRN Flaco Khanna MD        docusate sodium (COLACE) capsule 100 mg  100 mg Oral BID PRN Flaco Khanna MD        acetaminophen (TYLENOL) suppository 650 mg  650 mg Rectal Q4H PRN Flaco Khanna MD        labetaloL (NORMODYNE;TRANDATE) 20 mg/4 mL (5 mg/mL) injection 5 mg  5 mg IntraVENous Q10MIN PRN Nitza Antonio MD   5 mg at 08/20/20 1218       Past Medical History:   Diagnosis Date    Hypertension        History reviewed. No pertinent surgical history. Allergies   Allergen Reactions    Benadryl [Diphenhydramine Hcl] Hives       Vital signs:    Visit Vitals  BP (!) 133/92 (BP 1 Location: Left arm, BP Patient Position: At rest)   Pulse 80   Temp 96.8 °F (36 °C)   Resp 18   Ht 5' 3\" (1.6 m)   Wt 73.5 kg (162 lb)   SpO2 98%   BMI 28.70 kg/m²       Review of Systems:   unobtainable    EXAM: lethargic. Heart is regular. Aphasic. Makes more eye contact today, tries to monitor sounds when asking her name. Still has a profound aphasia. Left gaze pref is less noticeable today. PERRL, right lower facial droop. Flaccid RUE/RLE, power testing non cooperative. DTR's +3 on right. gait deferred, sensory not possible, no tremors. MRI brain 8/23 personally reviewed, showing large left MCA early subacute infarct corresponding to findings on comparison  head CT. Small area of petechial hemorrhage in the ventral aspect of the infarct but no focal hematoma. Chronic lacunar infarcts in the left and right thalami. Mild to moderate chronic microvascular ischemic changes in the cerebral white matter. A few scattered chronic microbleeds with nonspecific distribution. Suspect changes related to chronic hypertension. Echo echocardiogram showed a severely depressed left ventricular ejection fraction at 15 to 20%. CTA was normal.     On NSR on telemetry.      Recent Results (from the past 24 hour(s))   ECHO ADULT COMPLETE    Collection Time: 08/24/20  5:14 PM   Result Value Ref Range    IVSd 0.87 0.6 - 0.9 cm    LVIDd 5.79 (A) 3.9 - 5.3 cm    LVIDs 5.41 cm    LVOT d 1.82 cm    LVPWd 0.86 0.6 - 0.9 cm    LVOT Cardiac Output 2.54 liter/minute    LVOT Peak Gradient 3.5 mmHg    Left Ventricular Outflow Tract Mean Gradient 1.7489925070790 mmHg    LVOT SV 29.3 mL    LVOT Peak Velocity 94.03 cm/s    LVOT VTI 11.30 cm    LA Volume 91.21 22 - 52 mL    LA Area 4C 25.1 cm2    LA Vol 2C 82.12 (A) 22 - 52 mL    LA Vol 4C 89.54 (A) 22 - 52 mL    MV A Idris 70.64 cm/s    Mitral Valve E Wave Deceleration Time 122.0 ms    MV E Idris 85.55 cm/s    E/E' lateral 14.23     E/E' septal 18.97     LV E' Lateral Velocity 6.01 cm/s    LV E' Septal Velocity 4.51 cm/s    Tapse 1.63 1.5 - 2.0 cm    Triscuspid Valve Regurgitation Peak Gradient 53.2 mmHg    TR Max Velocity 364.57 cm/s    Ao Root D 2.85 cm    MV E/A 1.21     LV Mass .9 (A) 67 - 162 g    LV Mass AL Index 109.1 (A) 43 - 95 g/m2    E/E' ratio (averaged) 16.60     LA Vol Index 51.58 16 - 28 ml/m2    LA Vol Index 46.44 16 - 28 ml/m2    LA Vol Index 50.64 16 - 28 ml/m2    Left Ventricular Fractional Shortening by 2D 9.4392639228 %    Left Ventricular Outflow Tract Mean Velocity 5.47970404958175 cm/s    Mitral Valve Deceleration [place] 6.3861601997081     Left Ventricular End Diastolic Volume by Teichholz Method 9.76450627495955 mL    Left Ventricular End Systolic Volume by Teichholz Method 0.77618313449 mL    Left Ventricular Stroke Volume by Teichholz Method 81.782375611 mL   METABOLIC PANEL, BASIC    Collection Time: 08/25/20  3:00 AM   Result Value Ref Range    Sodium 143 136 - 145 mmol/L    Potassium 3.9 3.5 - 5.5 mmol/L    Chloride 109 100 - 111 mmol/L    CO2 29 21 - 32 mmol/L    Anion gap 5 3.0 - 18 mmol/L    Glucose 96 74 - 99 mg/dL    BUN 12 7.0 - 18 MG/DL    Creatinine 0.74 0.6 - 1.3 MG/DL    BUN/Creatinine ratio 16 12 - 20      GFR est AA >60 >60 ml/min/1.73m2    GFR est non-AA >60 >60 ml/min/1.73m2    Calcium 8.7 8.5 - 10.1 MG/DL   CBC WITH AUTOMATED DIFF    Collection Time: 08/25/20  3:00 AM   Result Value Ref Range    WBC 12.1 4.6 - 13.2 K/uL    RBC 4.54 4.20 - 5.30 M/uL    HGB 12.4 12.0 - 16.0 g/dL    HCT 40.0 35.0 - 45.0 %    MCV 88.1 74.0 - 97.0 FL    MCH 27.3 24.0 - 34.0 PG    MCHC 31.0 31.0 - 37.0 g/dL    RDW 15.8 (H) 11.6 - 14.5 %    PLATELET 749 557 - 229 K/uL    MPV 12.2 (H) 9.2 - 11.8 FL    NEUTROPHILS 67 40 - 73 %    LYMPHOCYTES 19 (L) 21 - 52 %    MONOCYTES 13 (H) 3 - 10 %    EOSINOPHILS 1 0 - 5 %    BASOPHILS 0 0 - 2 %    ABS. NEUTROPHILS 8.1 (H) 1.8 - 8.0 K/UL    ABS. LYMPHOCYTES 2.3 0.9 - 3.6 K/UL    ABS. MONOCYTES 1.5 (H) 0.05 - 1.2 K/UL    ABS. EOSINOPHILS 0.1 0.0 - 0.4 K/UL    ABS. BASOPHILS 0.0 0.0 - 0.1 K/UL    DF AUTOMATED     GLUCOSE, POC    Collection Time: 08/25/20  6:50 AM   Result Value Ref Range    Glucose (POC) 100 70 - 110 mg/dL         Assessment/Plan: Large left cardioembolic MCA stroke, with severe  congestive heart failure as the likely culprit. , no underlying proof on telemetry. Repeat CT of the head is pending. The patient is on heparin because of the PE. Will make more concrete recommendations for chronic oral anticoagulation, which she will need and could potentially be started as early as tomorrow as long as they are a bit head CT does not show any signs of hemorrhagic conversion. She is likely to be a good acute rehab candidate. Will follow. PLEASE NOTE:   Portions of this document may have been produced using voice recognition software. Unrecognized errors in transcription may be present. This note will not be viewable in [address]       None

## 2020-08-25 NOTE — PROGRESS NOTES
SLP Note:    Pt NPO for PEG placement. Will hold dysphagia treatment accordingly and continue to follow per POC.      Eugene Shaw M.S., [address] [facility]  Speech-Language Pathologist

## 2020-08-25 NOTE — PROGRESS NOTES
SUBJECTIVE:     Patient is awake. Denies for pain by nodding head but somewhat sleepy. No new issues per nursing.      OBJECTIVE:     BP (!) 133/92 (BP 1 Location: Left arm, BP Patient Position: At rest)   Pulse 79   Temp 96.8 °F (36 °C)   Resp 20   Ht 5' 3\" (1.6 m)   Wt 73.5 kg (162 lb)   SpO2 100%   BMI 28.70 kg/m²      General appearance -awake, NAD. Follows simple commands  Chest -diminished air entry noted in bases,no wheezes  Heart - S1 and S2 normal  Abdomen - soft, nontender, nondistended, Bowel sounds present, abdominal binder and PEG in situ   Neurological -awake, motor strength 0 out of 5 in right upper extremity and right lower extremity and 5 out of 5 in the left upper extremity and 4-5 out of 5 in left lower extremity  Extremities - no pedal edema noted     ASSESSMENT:     1. 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 Aspiration pneumonia  3. Dysphagia due to stroke s/p PEG  4. Left-sided PE and bilateral lower extremity DVT status post IVC filter  5. Hypertension  6. Elevated inflammatory biomarkers due to stroke, negative for COVID-19  7. Indeterminate elevation of troponin due to demand ischemia  8. Elevated BNP, no overt CHF  9. Leukocytosis due to #2 and #4, improvED  10. Hypokalemia, BETTER  11. Dilated cardiomyopathy with EF of 15 to 20% and apical thrombus      PLAN:     Continue current management  Pending CT head  GI input noted  D/w dr. Milind Jacobo: okay with anticoagulation from 8/26/2020  D/w cardiology yesterday  Will start low dose coreg   Patient can be discharged to SNF tomorrow if no new clinical issues overnight and cleared by cardiology. May need Life vest.      Discussed with patient's sister over the phone about the plan of care including discharge plan. Disclaimer: Sections of this note are dictated using utilizing voice recognition software, which may have resulted in some phonetic based errors in grammar and contents.  Even though attempts were made to correct all the mistakes, some may have been missed, and remained in the body of the document. If questions arise, please contact our department.     Recent Results (from the past 24 hour(s))   ECHO ADULT COMPLETE    Collection Time: 08/24/20  5:14 PM   Result Value Ref Range    IVSd 0.87 0.6 - 0.9 cm    LVIDd 5.79 (A) 3.9 - 5.3 cm    LVIDs 5.41 cm    LVOT d 1.82 cm    LVPWd 0.86 0.6 - 0.9 cm    LVOT Cardiac Output 2.54 liter/minute    LVOT Peak Gradient 3.5 mmHg    Left Ventricular Outflow Tract Mean Gradient 1.6774866169108 mmHg    LVOT SV 29.3 mL    LVOT Peak Velocity 94.03 cm/s    LVOT VTI 11.30 cm    LA Volume 91.21 22 - 52 mL    LA Area 4C 25.1 cm2    LA Vol 2C 82.12 (A) 22 - 52 mL    LA Vol 4C 89.54 (A) 22 - 52 mL    MV A Idris 70.64 cm/s    Mitral Valve E Wave Deceleration Time 122.0 ms    MV E Idris 85.55 cm/s    E/E' lateral 14.23     E/E' septal 18.97     LV E' Lateral Velocity 6.01 cm/s    LV E' Septal Velocity 4.51 cm/s    Tapse 1.63 1.5 - 2.0 cm    Triscuspid Valve Regurgitation Peak Gradient 53.2 mmHg    TR Max Velocity 364.57 cm/s    Ao Root D 2.85 cm    MV E/A 1.21     LV Mass .9 (A) 67 - 162 g    LV Mass AL Index 109.1 (A) 43 - 95 g/m2    E/E' ratio (averaged) 16.60     LA Vol Index 51.58 16 - 28 ml/m2    LA Vol Index 46.44 16 - 28 ml/m2    LA Vol Index 50.64 16 - 28 ml/m2    Left Ventricular Fractional Shortening by 2D 3.2189652557 %    Left Ventricular Outflow Tract Mean Velocity 9.64076055227013 cm/s    Mitral Valve Deceleration [place] 6.4223697392568     Left Ventricular End Diastolic Volume by Teichholz Method 2.48787684247439 mL    Left Ventricular End Systolic Volume by Teichholz Method 0.03406772176 mL    Left Ventricular Stroke Volume by Teichholz Method 02.854501602 mL   METABOLIC PANEL, BASIC    Collection Time: 08/25/20  3:00 AM   Result Value Ref Range    Sodium 143 136 - 145 mmol/L    Potassium 3.9 3.5 - 5.5 mmol/L    Chloride 109 100 - 111 mmol/L    CO2 29 21 - 32 mmol/L    Anion gap 5 3.0 - 18 mmol/L    Glucose 96 74 - 99 mg/dL    BUN 12 7.0 - 18 MG/DL    Creatinine 0.74 0.6 - 1.3 MG/DL    BUN/Creatinine ratio 16 12 - 20      GFR est AA >60 >60 ml/min/1.73m2    GFR est non-AA >60 >60 ml/min/1.73m2    Calcium 8.7 8.5 - 10.1 MG/DL   CBC WITH AUTOMATED DIFF    Collection Time: 08/25/20  3:00 AM   Result Value Ref Range    WBC 12.1 4.6 - 13.2 K/uL    RBC 4.54 4.20 - 5.30 M/uL    HGB 12.4 12.0 - 16.0 g/dL    HCT 40.0 35.0 - 45.0 %    MCV 88.1 74.0 - 97.0 FL    MCH 27.3 24.0 - 34.0 PG    MCHC 31.0 31.0 - 37.0 g/dL    RDW 15.8 (H) 11.6 - 14.5 %    PLATELET 375 209 - 424 K/uL    MPV 12.2 (H) 9.2 - 11.8 FL    NEUTROPHILS 67 40 - 73 %    LYMPHOCYTES 19 (L) 21 - 52 %    MONOCYTES 13 (H) 3 - 10 %    EOSINOPHILS 1 0 - 5 %    BASOPHILS 0 0 - 2 %    ABS. NEUTROPHILS 8.1 (H) 1.8 - 8.0 K/UL    ABS. LYMPHOCYTES 2.3 0.9 - 3.6 K/UL    ABS. MONOCYTES 1.5 (H) 0.05 - 1.2 K/UL    ABS. EOSINOPHILS 0.1 0.0 - 0.4 K/UL    ABS.  BASOPHILS 0.0 0.0 - 0.1 K/UL    DF AUTOMATED     GLUCOSE, POC    Collection Time: 08/25/20  6:50 AM   Result Value Ref Range    Glucose (POC) 100 70 - 110 mg/dL

## 2020-08-25 NOTE — ANESTHESIA POSTPROCEDURE EVALUATION
Procedure(s):  PERCUTANEOUS ENDOSCOPIC GASTROSTOMY TUBE INSERTION. MAC    Anesthesia Post Evaluation      Multimodal analgesia: multimodal analgesia used between 6 hours prior to anesthesia start to PACU discharge  Patient location during evaluation: bedside  Patient participation: complete - patient participated  Level of consciousness: awake  Pain management: adequate  Airway patency: patent  Anesthetic complications: no  Cardiovascular status: stable  Respiratory status: acceptable  Hydration status: acceptable  Post anesthesia nausea and vomiting:  controlled      INITIAL Post-op Vital signs:   Vitals Value Taken Time   /75 8/25/2020 10:52 AM   Temp 36.9 °C (98.4 °F) 8/25/2020 10:37 AM   Pulse 79 8/25/2020 11:01 AM   Resp 23 8/25/2020 11:01 AM   SpO2 99 % 8/25/2020 11:01 AM   Vitals shown include unvalidated device data.

## 2020-08-25 NOTE — ROUTINE PROCESS
Bedside and Verbal shift change report given to [address] (oncoming nurse) by Humphrey Sierra RN (offgoing nurse). Report included the following information SBAR, Kardex, Intake/Output, MAR, Recent Results and Med Rec Status.

## 2020-08-25 NOTE — PROGRESS NOTES
Nutrition Assessment     Type and Reason for Visit: Reassess, Positive nutrition screen, Consult(Management of Tube Feeding)    Nutrition Recommendations/Plan:   - Per GI PEG okay to use for nutrition starting at 1630- resume tube feeding of Jevity 1.5 at 50 mL/hr with 200 mL q 4 hour water flushes via PEG  - Recommend discontinuing IVF once EN resumed. Nutrition Assessment:  Tolerating TF at 50 mL/hr, held since midnight & PEG placed today. Okay for use at 1630 per GI if no complications arise & abdominal binder to remain in place. Na WNL today, remains on D5. Malnutrition Assessment:  Malnutrition Status: At risk for malnutrition (specify)(unable to tolerate oral diet due to dysphagia, altered mentation/confusion)     Estimated Daily Nutrient Needs:  Energy (kcal):  2853-1012  Protein (g):  58-86       Fluid (ml/day):  2269-3489    Nutrition Related Findings:  Loose BM 8/25. Ascites with routine paracentesis PTA. Aphasic.  K WNL after previously requiring replacement      Current Nutrition Therapies:  DIET TUBE FEEDING   Current Tube Feeding (TF) Orders: held since 8/25 at midnight for PEG placement  · Feeding Route: Nasogastric  · Formula: Jevity 1.5  · Schedule:Continuous    · Regimen: 50 mL/hr  · Additives/Modulars:    · Water Flushes: 200 mL q 4 hours (1200 mL)  · Current TF & Flush Orders Provides: 1800 kcal, 77 gm protein, 912 mL free water, 100% RDIs  · Goal TF & Flush Orders Provides: 1800 kcal, 77 gm protein, 912 mL free water, 100% RDIs    Anthropometric Measures:  · Height:  5' 3\" (160 cm)  · Current Body Wt:  72.6 kg (160 lb)  · BMI: 28.4    Nutrition Diagnosis:   · Inadequate oral intake related to cognitive or neurological impairment, swallowing difficulty as evidenced by NPO or clear liquid status due to medical condition, swallowing study results    Nutrition Intervention:  Food and/or Nutrient Delivery: Continue NPO, Modify tube feeding, IV fluid delivery  Nutrition Education and Counseling: No recommendations at this time  Coordination of Nutrition Care: Continued inpatient monitoring, Speech therapy    Goals:  Patient will tolerate enteral nutrition formula and regimen without difficulty within the next 7 days.        Nutrition Monitoring and Evaluation:   Food/Nutrient Intake Outcomes: Enteral nutrition intake/tolerance, Vitamin/mineral intake, IVF intake  Physical Signs/Symptoms Outcomes: Biochemical data, Chewing or swallowing, GI status, Fluid status or edema, Nutrition focused physical findings    Discharge Planning:    Enteral nutrition     Electronically signed by Chichi StaffDEBORA on 8/25/2020 at 12:24 PM    Contact Number: [phone]

## 2020-08-25 NOTE — PROCEDURES
[email or web address]  [phone]        Percutaneous Endoscopic Gastroduodenoscopy Procedure Note    Danish Lynn  1955  [id number]      Date of Procedure: 8/25/2020    Preoperative diagnosis: DX    Postoperative diagnosis: Peg placement    Procedure: Procedure(s):  PERCUTANEOUS ENDOSCOPIC GASTROSTOMY TUBE INSERTION    Indication: Dysphagia    :  Dr. Sal Sheikh MD    Assistant(s): Endoscopy Technician-1: Jenny Chew RN-1: Janes Curry RN  Float Staff: Andrea Sampson RN    Anesthesia/Sedation:  MAC anesthesia Propofol      Procedure Details     After infomed consent was obtained for the procedure, with all risks and benefits of procedure explained the patient was taken to the endoscopy suite and placed in the left lateral decubitus position. Following sequential administration of sedation as per above, the endoscope was inserted into the mouth and advanced under direct vision to the second portion of the duodenum. The esophagus looked normal.  There were no diagnostic abnormalities of the body, fundus, antrum, cardia and iscisura of the stomach. The first and second portion of the duodenum appeared normal.      A site was selected on the anterior abdominal wall where the light transilluminated adequately and where one finger easily indented the anterior abdominal wall. Lidocaine analgesia was utilized (1%). The exploring needle easily indented the stomach and penetrated it. The needle-trocar device was then inserted into the skin after an incision. Once the needle trocar device entered the stomach the trocar was grasped by the snare. The needle was removed and a wire was placed thorough the trocar. The wire was grasped by the snare and removed at the mouth. A 24 Fr [employer]alking [employer] externally removable bumper PEG tube was positioned over the wire and pulled out of the anterior abdominal wall under traction.  The incision site was enlarged as necessary and the tube was pulled snug. External bumper was placed at 3.5 cm, lightly touching skin. Complications:   None; patient tolerated the procedure well. EBL:  None. Tissue Implant Device: PEG           Impression:     1. PEG placement    Recommendations:  1. May use PEG immediately for medications  2. May begin tube feedings in 6 hours, if active bowel sounds noted and no apparent complications  3. Feeding, formula and rate per Nutrition  4. Maintain single gauze underneath bumper for pressure hemostasis, otherwise keep area clean and dry. 5. Abdominal binder to be kept in place when PEG not in use to avoid patient pulling PEG    Seth Cosme MD  8/25/2020  10:21 AM    Seth Cosme MD  [facility] 1945, [address] [place] [address] - [phone]  [email or web address]

## 2020-08-25 NOTE — PROGRESS NOTES
Infectious Disease progress Note        Reason: Acute pulmonary embolism, suspected COVID-19 infection    Current abx Prior abx   Azithromycin since 8/19-8/20  Zosyn since 8/20 Piperacillin/tazobactam 8/19     Lines:       Assessment :     60 y.o.  female  with past medical history significant for hypertension who presented to ED on 8/19/2020 with significant new onset weakness to right side of body. CT head: Acute/subacute large nonhemorrhagic left MCA territory infarction. CTA head and neck 8/19-moderate-sized pulmonary embolism. CXR 8/19- RML infiltrate    Now with worsening leukocytosis. Clinical presentation consistent with aspiration pneumonia in a patient with acute pulmonary embolism, CVA. Worsening leukocytosis likely secondary to aspiration pneumonia along with leukemoid reaction to acute pulmonary embolism. Negative rapid covid test 8/20/2020,negative [facility] covid test 8/20    Clinically better. More alert. Attempts to communicate. Recommendations:    1. continue piperacillin/tazobactam (d 6/7)  2. Agree with plans for peg  3. Needs frequent suctioning-discussed with nursing  4. Management of pulmonary embolism per primary team  5. Management of CVA per neurology  6. Ok switch to p.o. Augmentin via PEG to complete treatment of pneumonia once allowed to use PEG tube. Above plan was discussed in details with patient, RN and dr Alanna Layne. Please call me if any further questions or concerns. Will continue to participate in the care of this patient. HPI:  Unable to communicate effectively. Detailed review of system not feasible    History reviewed. No pertinent surgical history. Home Medication List      Does not communicate. Detailed review of system not feasible Details   ondansetron hcl (ZOFRAN) 4 mg tablet Take 2 Tabs by mouth every eight (8) hours as needed for Nausea.   Qty: 12 Tab, Refills: 0             Current Facility-Administered Medications   Medication Dose Route Frequency    lactated Ringers infusion  25 mL/hr IntraVENous CONTINUOUS    aspirin tablet 325 mg  325 mg Per G Tube DAILY    amLODIPine (NORVASC) tablet 5 mg  5 mg Oral DAILY    ipratropium-albuterol (COMBIVENT RESPIMAT) 20 mcg-100 mcg inhalation spray  1 Puff Inhalation Q4H RT    dextrose 5% with KCl 20 mEq/L infusion   IntraVENous CONTINUOUS    heparin (porcine) 25,000 units in 0.45% saline 250 ml infusion  18-36 Units/kg/hr IntraVENous TITRATE    piperacillin-tazobactam (ZOSYN) 3.375 g in 0.9% sodium chloride (MBP/ADV) 100 mL MBP  3.375 g IntraVENous Q6H    sodium chloride (NS) flush 5-40 mL  5-40 mL IntraVENous Q8H    sodium chloride (NS) flush 5-40 mL  5-40 mL IntraVENous PRN    docusate sodium (COLACE) capsule 100 mg  100 mg Oral BID PRN    acetaminophen (TYLENOL) suppository 650 mg  650 mg Rectal Q4H PRN    labetaloL (NORMODYNE;TRANDATE) 20 mg/4 mL (5 mg/mL) injection 5 mg  5 mg IntraVENous Q10MIN PRN    melatonin tablet 3 mg  3 mg Oral QHS    ascorbic acid (vitamin C) (VITAMIN C) tablet 500 mg  500 mg Oral DAILY    atorvastatin (LIPITOR) tablet 80 mg  80 mg Oral QHS       Allergies: Benadryl [diphenhydramine hcl]    Temp (24hrs), Av.9 °F (36.6 °C), Min:96.9 °F (36.1 °C), Max:98.6 °F (37 °C)    Visit Vitals  /64   Pulse 87   Temp 98.4 °F (36.9 °C)   Resp 26   Ht 5' 3\" (1.6 m)   Wt 73.5 kg (162 lb)   SpO2 100%   BMI 28.70 kg/m²       ROS: unable to obtain    Physical Exam:    General:  Alert, cooperative, no acute distress    Pulmonary: wet sounds from upper airway, no wheezing  Cardiovascular: Regular rate and Rhythm. GI:  Soft, Non distended, Non tender. + Bowel sounds. Extremities:  No edema. Psych: Good insight. Not anxious or agitated.   Neurologic: Alert awake, a phasic, motor strength left side 5/5, motor strength right side 0/5    Labs: Results:   Chemistry Recent Labs     20  0300 20  0514 20  0019   GLU 96 175* 133*  146* 151*   K 3.9 3.8 3.1*    114* 117*   CO2 29 28 28   BUN 12 14 18   CREA 0.74 0.76 0.94   CA 8.7 8.3* 8.1*   AGAP 5 4 6   BUCR 16 18 19      CBC w/Diff Recent Labs     08/25/20  0300 08/24/20  0514 08/23/20  0019   WBC 12.1 15.8* 17.4*   RBC 4.54 4.41 4.89   HGB 12.4 12.3 13.4   HCT 40.0 38.9 42.3    247 208   GRANS 67 78* 83*   LYMPH 19* 11* 8*   EOS 1 0 0      Microbiology No results for input(s): CULT in the last 72 hours.        RADIOLOGY:    All available imaging studies/reports in [facility] for this admission were reviewed    Dr. Bridget Noriega, Infectious Disease Specialist  [phone]  August 25, 2020  2:28 PM

## 2020-08-25 NOTE — PROGRESS NOTES
TRANSFER - IN REPORT:    Verbal report received from Charlette Panda RN(name) on [facility]  being received from [address] [place] [address](unit) for ordered procedure      Report consisted of patients Situation, Background, Assessment and   Recommendations(SBAR). Information from the following report(s) SBAR, Kardex, [facility] and Recent Results was reviewed with the receiving nurse. Opportunity for questions and clarification was provided. Assessment completed upon patients arrival to unit and care assumed.

## 2020-08-26 LAB
ANION GAP SERPL CALC-SCNC: 4 MMOL/L (ref 3–18)
APTT PPP: 94.3 SEC (ref 23–36.4)
BACTERIA SPEC CULT: NORMAL
BACTERIA SPEC CULT: NORMAL
BASOPHILS # BLD: 0 K/UL (ref 0–0.06)
BASOPHILS NFR BLD: 0 % (ref 0–3)
BUN SERPL-MCNC: 15 MG/DL (ref 7–18)
BUN/CREAT SERPL: 21 (ref 12–20)
CALCIUM SERPL-MCNC: 8.8 MG/DL (ref 8.5–10.1)
CHLORIDE SERPL-SCNC: 107 MMOL/L (ref 100–111)
CO2 SERPL-SCNC: 28 MMOL/L (ref 21–32)
COVID-19 RAPID TEST, COVR: NOT DETECTED
CREAT SERPL-MCNC: 0.71 MG/DL (ref 0.6–1.3)
DIFFERENTIAL METHOD BLD: ABNORMAL
EOSINOPHIL # BLD: 0.1 K/UL (ref 0–0.4)
EOSINOPHIL NFR BLD: 1 % (ref 0–5)
ERYTHROCYTE [DISTWIDTH] IN BLOOD BY AUTOMATED COUNT: 15.4 % (ref 11.6–14.5)
GLUCOSE SERPL-MCNC: 137 MG/DL (ref 74–99)
HCT VFR BLD AUTO: 38.8 % (ref 35–45)
HGB BLD-MCNC: 12.3 G/DL (ref 12–16)
INR PPP: 1.2 (ref 0.8–1.2)
LYMPHOCYTES # BLD: 2.9 K/UL (ref 0.8–3.5)
LYMPHOCYTES NFR BLD: 23 % (ref 20–51)
MCH RBC QN AUTO: 27.5 PG (ref 24–34)
MCHC RBC AUTO-ENTMCNC: 31.7 G/DL (ref 31–37)
MCV RBC AUTO: 86.8 FL (ref 74–97)
MONOCYTES # BLD: 1.3 K/UL (ref 0–1)
MONOCYTES NFR BLD: 10 % (ref 2–9)
NEUTS SEG # BLD: 8.3 K/UL (ref 1.8–8)
NEUTS SEG NFR BLD: 66 % (ref 42–75)
PLATELET # BLD AUTO: 290 K/UL (ref 135–420)
PLATELET COMMENTS,PCOM: ABNORMAL
PMV BLD AUTO: 12.8 FL (ref 9.2–11.8)
POTASSIUM SERPL-SCNC: 3.8 MMOL/L (ref 3.5–5.5)
PROTHROMBIN TIME: 15.2 SEC (ref 11.5–15.2)
RBC # BLD AUTO: 4.47 M/UL (ref 4.2–5.3)
RBC MORPH BLD: ABNORMAL
SERVICE CMNT-IMP: NORMAL
SERVICE CMNT-IMP: NORMAL
SODIUM SERPL-SCNC: 139 MMOL/L (ref 136–145)
SOURCE, COVRS: NORMAL
SPECIMEN TYPE, XMCV1T: NORMAL
WBC # BLD AUTO: 12.6 K/UL (ref 4.6–13.2)

## 2020-08-26 PROCEDURE — 87635 SARS-COV-2 COVID-19 AMP PRB: CPT

## 2020-08-26 PROCEDURE — 85730 THROMBOPLASTIN TIME PARTIAL: CPT

## 2020-08-26 PROCEDURE — 74011000258 HC RX REV CODE- 258: Performed by: INTERNAL MEDICINE

## 2020-08-26 PROCEDURE — 83090 ASSAY OF HOMOCYSTEINE: CPT

## 2020-08-26 PROCEDURE — 74011250637 HC RX REV CODE- 250/637: Performed by: INTERNAL MEDICINE

## 2020-08-26 PROCEDURE — 65270000029 HC RM PRIVATE

## 2020-08-26 PROCEDURE — 97112 NEUROMUSCULAR REEDUCATION: CPT

## 2020-08-26 PROCEDURE — 85305 CLOT INHIBIT PROT S TOTAL: CPT

## 2020-08-26 PROCEDURE — 85302 CLOT INHIBIT PROT C ANTIGEN: CPT

## 2020-08-26 PROCEDURE — 94761 N-INVAS EAR/PLS OXIMETRY MLT: CPT

## 2020-08-26 PROCEDURE — 77010033678 HC OXYGEN DAILY

## 2020-08-26 PROCEDURE — 85613 RUSSELL VIPER VENOM DILUTED: CPT

## 2020-08-26 PROCEDURE — 85303 CLOT INHIBIT PROT C ACTIVITY: CPT

## 2020-08-26 PROCEDURE — 80048 BASIC METABOLIC PNL TOTAL CA: CPT

## 2020-08-26 PROCEDURE — 86147 CARDIOLIPIN ANTIBODY EA IG: CPT

## 2020-08-26 PROCEDURE — 74011250636 HC RX REV CODE- 250/636: Performed by: INTERNAL MEDICINE

## 2020-08-26 PROCEDURE — 85307 ASSAY ACTIVATED PROTEIN C: CPT

## 2020-08-26 PROCEDURE — 94640 AIRWAY INHALATION TREATMENT: CPT

## 2020-08-26 PROCEDURE — 81291 MTHFR GENE: CPT

## 2020-08-26 PROCEDURE — 85025 COMPLETE CBC W/AUTO DIFF WBC: CPT

## 2020-08-26 PROCEDURE — 81241 F5 GENE: CPT

## 2020-08-26 PROCEDURE — 85301 ANTITHROMBIN III ANTIGEN: CPT

## 2020-08-26 PROCEDURE — 36415 COLL VENOUS BLD VENIPUNCTURE: CPT

## 2020-08-26 PROCEDURE — 85610 PROTHROMBIN TIME: CPT

## 2020-08-26 RX ORDER — DOCUSATE SODIUM 50 MG/5ML
100 LIQUID ORAL DAILY
Status: DISCONTINUED | OUTPATIENT
Start: 2020-08-26 | End: 2020-08-29 | Stop reason: HOSPADM

## 2020-08-26 RX ORDER — ENOXAPARIN SODIUM 100 MG/ML
1 INJECTION SUBCUTANEOUS EVERY 12 HOURS
Status: DISCONTINUED | OUTPATIENT
Start: 2020-08-26 | End: 2020-08-28

## 2020-08-26 RX ORDER — AMOXICILLIN AND CLAVULANATE POTASSIUM 875; 125 MG/1; MG/1
1 TABLET, FILM COATED ORAL EVERY 12 HOURS
Status: COMPLETED | OUTPATIENT
Start: 2020-08-26 | End: 2020-08-28

## 2020-08-26 RX ADMIN — ENOXAPARIN SODIUM 70 MG: 80 INJECTION SUBCUTANEOUS at 17:03

## 2020-08-26 RX ADMIN — DOCUSATE SODIUM 100 MG: 50 LIQUID ORAL at 16:57

## 2020-08-26 RX ADMIN — CARVEDILOL 6.25 MG: 6.25 TABLET, FILM COATED ORAL at 10:35

## 2020-08-26 RX ADMIN — PIPERACILLIN SODIUM AND TAZOBACTAM SODIUM 3.38 G: 3; .375 INJECTION, POWDER, LYOPHILIZED, FOR SOLUTION INTRAVENOUS at 02:48

## 2020-08-26 RX ADMIN — AMOXICILLIN AND CLAVULANATE POTASSIUM 1 TABLET: 875; 125 TABLET, FILM COATED ORAL at 23:34

## 2020-08-26 RX ADMIN — ASPIRIN 81 MG CHEWABLE TABLET 81 MG: 81 TABLET CHEWABLE at 10:35

## 2020-08-26 RX ADMIN — WARFARIN SODIUM 5 MG: 5 TABLET ORAL at 17:02

## 2020-08-26 RX ADMIN — IPRATROPIUM BROMIDE AND ALBUTEROL 1 PUFF: 20; 100 SPRAY, METERED RESPIRATORY (INHALATION) at 00:00

## 2020-08-26 RX ADMIN — IPRATROPIUM BROMIDE AND ALBUTEROL 1 PUFF: 20; 100 SPRAY, METERED RESPIRATORY (INHALATION) at 20:45

## 2020-08-26 RX ADMIN — IPRATROPIUM BROMIDE AND ALBUTEROL 1 PUFF: 20; 100 SPRAY, METERED RESPIRATORY (INHALATION) at 16:13

## 2020-08-26 RX ADMIN — SACUBITRIL AND VALSARTAN 1 TABLET: 24; 26 TABLET, FILM COATED ORAL at 17:02

## 2020-08-26 RX ADMIN — Medication 10 ML: at 17:03

## 2020-08-26 RX ADMIN — IPRATROPIUM BROMIDE AND ALBUTEROL 1 PUFF: 20; 100 SPRAY, METERED RESPIRATORY (INHALATION) at 13:31

## 2020-08-26 RX ADMIN — Medication 1 CAPSULE: at 17:02

## 2020-08-26 RX ADMIN — IPRATROPIUM BROMIDE AND ALBUTEROL 1 PUFF: 20; 100 SPRAY, METERED RESPIRATORY (INHALATION) at 03:04

## 2020-08-26 RX ADMIN — AMLODIPINE BESYLATE 5 MG: 5 TABLET ORAL at 10:35

## 2020-08-26 RX ADMIN — AMOXICILLIN AND CLAVULANATE POTASSIUM 1 TABLET: 875; 125 TABLET, FILM COATED ORAL at 17:01

## 2020-08-26 RX ADMIN — ATORVASTATIN CALCIUM 80 MG: 40 TABLET, FILM COATED ORAL at 23:24

## 2020-08-26 RX ADMIN — SACUBITRIL AND VALSARTAN 1 TABLET: 24; 26 TABLET, FILM COATED ORAL at 10:36

## 2020-08-26 RX ADMIN — PIPERACILLIN SODIUM AND TAZOBACTAM SODIUM 3.38 G: 3; .375 INJECTION, POWDER, LYOPHILIZED, FOR SOLUTION INTRAVENOUS at 09:36

## 2020-08-26 RX ADMIN — ENOXAPARIN SODIUM 70 MG: 80 INJECTION SUBCUTANEOUS at 23:00

## 2020-08-26 RX ADMIN — CARVEDILOL 6.25 MG: 6.25 TABLET, FILM COATED ORAL at 23:24

## 2020-08-26 RX ADMIN — Medication 10 ML: at 23:23

## 2020-08-26 NOTE — DISCHARGE SUMMARY
Physician Discharge Summary       Patient: Danish Lynn MRN: [id number]  SSN: [id number]    YOB: 1955  Age: 60 y.o. Sex: female    PCP: Laquita Gunter MD    Allergies: Benadryl [diphenhydramine hcl]    Admit date: 8/19/2020  Admitting Provider: Emigdio Stokes MD    Discharge date: 8/26/2020  Discharging Provider: Zeenat Gaona MD    * Admission Diagnoses: Cellulitis [L03.90]  Cerebrovascular accident (CVA) ([facility] [address] [I63.9]  Elevated troponin I level [R79.89]  Cerebrovascular accident (CVA) ([facility] [address] [I63.9]  CVA (cerebral vascular accident) ([facility] [address] [I63.9]    * Discharge Diagnoses:      1. 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 Aspiration pneumonia, clinically better  3. Dysphagia due to stroke s/p PEG  4. Left-sided PE and bilateral lower extremity DVT status post IVC filter  5. Hypertension  6. Elevated inflammatory biomarkers due to stroke, negative for COVID-19  7. Indeterminate elevation of troponin due to demand ischemia  8. Elevated BNP due to # 11  9. Leukocytosis due to #2 and #4, improved  10. Hypokalemia, BETTER  11. Dilated cardiomyopathy with EF of 15 to 20% and apical thrombus     [facility] Course: The patient presented to the hospital on August 19, 2020 with a complaint of weakness. Please refer hospital admission H&P for further detail. She was found on the floor. Was admitted here with a diagnosis of acute left MCA CVA causing right-sided weakness and also having right-sided aspiration pneumonia. Patient had CT head done in the emergency room reported acute nonhemorrhagic left MCA stroke. CTA head and neck was done reported patent intra-and extracranial brain arteries however it showed left-sided moderate PE. Patient was started on heparin drip. Patient also underwent bilateral lower extremity vascular Doppler showed right leg DVT.   After neurology consult, it was decided to have vascular surgery to see this patient for IVC filter placement as there is a risk of hemorrhagic conversion of this week stroke. Patient was seen by vascular surgeon and IVC filter was placed. Patient also underwent multiple x-rays for MRI screening. MRI of the brain reported patient having large left MCA stroke with microhemorrhages without any hematoma. Patient also had serial CT scan done while being on heparin drip and it did not show any hemorrhages. CTA chest was positive for bilateral PE.  CT head from August 25, 2020 did not show any hemorrhage. Patient was seen by ID service and continued on IV antibiotic and her leukocytosis got better. She has 2 sets of blood culture negative. Patient was also seen by cardiology service due to elevated BNP and echocardiogram was done that showed EF of 15-20 percentage with apical thrombus. Due to patient's functional status, it was decided to hold on using LifeVest or doing any cardiac procedure until she gets better until then patient will be managed with medications. Neurologist also cleared to use Lovenox with Coumadin until INR is greater than 2. Patient was also found out to have dysphagia, initially she was an NG tube and followed by PEG tube feeding. She was tolerating PEG tube feeding without any problem. She has been negative for COVID-19. It was discussed with patient's family member at length over the phone about the need of anticoagulation and the risk of anticoagulations in form of hemorrhage. They verbalized understanding. Patient will also remain at risk for complications from the stroke in form of infection or new stroke and that has been discussed with patient's family members to. Family numbers also understand the patient has dilated cardiomyopathy and that we will put at risk for cardiac arrhythmia. However, at this point patient needs aggressive PT and OT to regain her strength back. Patient will be placed in skilled nursing facility for further care.     * Procedures: IVC filter placed  Procedure(s):  PERCUTANEOUS ENDOSCOPIC GASTROSTOMY TUBE INSERTION      Consults: Cardiology, ID, GI, Neurology and Vascular Surgery    Significant Diagnostic Studies: CT head x3, MRI of the brain, CTA head and neck, CTA chest, chest x-rays, KUBs, echocardiogram    Discharge Exam:  Please read my progress note from August 26, 2020 for additional information    * Discharge Condition: fair  * Disposition: [facility] (SNF)    Discharge Medications:  Current Discharge Medication List          * Follow-up Care/Patient Instructions: Activity: PT/OT Eval and Treat  Diet: PEG feeding at Jevity 1.5 at 55 ml/hr, aspiration precautions, 200 cc water flushes every 4 hours, dietitian and speech therapy to follow this patient  Wound Care: Frequent changing positions every 2 hours while patient is awake    Follow-up Information     Follow up With Specialties Details Why Contact Info    Kayleigh Nova MD Internal Medicine   [address] [place] [phone]          Follow-up Appointments   Procedures    FOLLOW UP VISIT Appointment in: Other ([facility]) NH doctor to follow this patient With dr. Sylvia Thompson and group in 3-4 weeks for stroke With dr. Doc Ruiz and group in 2-3 weeks     NH doctor to follow this patient  With dr. Sylvia Thompson and group in 3-4 weeks for stroke  With dr. Doc Ruiz and group in 2-3 weeks     Standing Status:   Standing     Number of Occurrences:   1     Order Specific Question:   Appointment in     Answer: Other (Specify)     Total time of discharge greater than 35 minutes    Disclaimer: Sections of this note are dictated using utilizing voice recognition software, which may have resulted in some phonetic based errors in grammar and contents. Even though attempts were made to correct all the mistakes, some may have been missed, and remained in the body of the document. If questions arise, please contact our department.     Signed:  iCndy Lemus MD  8/26/2020  11:56 AM

## 2020-08-26 NOTE — ROUTINE PROCESS
Bedside and verbal report received from Deepthi Rizvi (offgoing nurse) to Lisbeth (oncoming nurse). Report included the following information; SBAR, MAR, LABS, Intake/output, Kardex, and summary of care. Nurses at the bedside doubting checking heparin drip. Patient resting quietly in bed with no noted distress. HOB at 45 degrees and call bell in reach. Will continue to monitor patient. 2330  Incontinence care given. Patient resting quietly in bed. 
 
0430  Patient bathed and mouth care. Resting quietly. Suction and call bell at the bedside

## 2020-08-26 NOTE — PROGRESS NOTES
8/26/2020 4:18 PM    SSN: [id number]    Subjective:   61 y/o female admitted 8/19 after being found down with a right sided hemiplegia and an aphasia. CT head on admission showed a large acute left MCA stroke with some mass effect. CTA was normal, but showed a moderate acute PE incidentally. MRI confirmed a left MCA acute infarct with minimal mass-effect. She has remained lethargic, with a dense right hemiplegia and aphasia. On heparin for DVT, s/p IVC filter. Has been in NSR on telemetry. She had an echocardiogram showed an ejection fraction of 50 to 20%. A CT of the head done yesterday was personally reviewed. 6 days after her presentation it showed no hemorrhagic conversion whatsoever, no hemorrhagic components, essentially a hypodensity in the left MCA distribution consistent with a large left MCA infarct. No significant mass-effect is seen.     Social History     Socioeconomic History    Marital status:      Spouse name: Not on file    Number of children: Not on file    Years of education: Not on file    Highest education level: Not on file   Occupational History    Not on file   Social Needs    Financial resource strain: Not on file    Food insecurity     Worry: Not on file     Inability: Not on file    Transportation needs     Medical: Not on file     Non-medical: Not on file   Tobacco Use    Smoking status: Current Every Day Smoker     Packs/day: 1.00    Smokeless tobacco: Never Used   Substance and Sexual Activity    Alcohol use: Yes     Comment: socially    Drug use: Never    Sexual activity: Not on file   Lifestyle    Physical activity     Days per week: Not on file     Minutes per session: Not on file    Stress: Not on file   Relationships    Social connections     Talks on phone: Not on file     Gets together: Not on file     Attends Latter day service: Not on file     Active member of club or organization: Not on file     Attends meetings of clubs or organizations: Not on file     Relationship status: Not on file    Intimate partner violence     Fear of current or ex partner: Not on file     Emotionally abused: Not on file     Physically abused: Not on file     Forced sexual activity: Not on file   Other Topics Concern    Not on file   Social History Narrative    Not on file       History reviewed. No pertinent family history.     Current Facility-Administered Medications   Medication Dose Route Frequency Provider Last Rate Last Dose    amLODIPine (NORVASC) tablet 5 mg  5 mg Per G Tube DAILY Darren Bassett MD        atorvastatin (LIPITOR) tablet 80 mg  80 mg Per G Tube QHS Darren Bassett MD   80 mg at 08/25/20 2240    warfarin (COUMADIN) tablet 5 mg  5 mg Per G Tube QPM Darren Bassett MD   5 mg at 08/25/20 1848    melatonin tablet 3 mg  3 mg Per G Tube QHS PRN Darren Bassett MD        aspirin chewable tablet 81 mg  81 mg Per G Tube DAILY Darren Bassett MD        Warfarin - Physician to dose   Other Q24H Enma Busch MD        carvediloL (COREG) tablet 6.25 mg  6.25 mg Per G Tube Q12H Milla Ann MD   6.25 mg at 08/25/20 1848    sacubitriL-valsartan (ENTRESTO) 24-26 mg tablet 1 Tab  1 Tab Oral BID Milla Ann MD        ipratropium-albuterol (COMBIVENT RESPIMAT) 20 mcg-100 mcg inhalation spray  1 Puff Inhalation Q4H RT Darren Bassett MD   1 Puff at 08/26/20 0304    dextrose 5% with KCl 20 mEq/L infusion   IntraVENous CONTINUOUS Darren Bassett MD 50 mL/hr at 08/25/20 1800      heparin (porcine) 25,000 units in 0.45% saline 250 ml infusion  18-36 Units/kg/hr IntraVENous TITRATE Darren Bassett MD 13.7 mL/hr at 08/26/20 0454 22 Units/kg/hr at 08/26/20 0454    piperacillin-tazobactam (ZOSYN) 3.375 g in 0.9% sodium chloride (MBP/ADV) 100 mL MBP  3.375 g IntraVENous Q6H Trudy DIAS  mL/hr at 08/26/20 0248 3.375 g at 08/26/20 0248    sodium chloride (NS) flush 5-40 mL  5-40 mL IntraVENous Q8H Alistair Randall MD   10 mL at 08/25/20 2240    sodium chloride (NS) flush 5-40 mL  5-40 mL IntraVENous PRN Raymundo Khanna MD        docusate sodium (COLACE) capsule 100 mg  100 mg Oral BID PRN Raymundo Khanan MD        acetaminophen (TYLENOL) suppository 650 mg  650 mg Rectal Q4H PRN Raymundo Khanna MD        labetaloL (NORMODYNE;TRANDATE) 20 mg/4 mL (5 mg/mL) injection 5 mg  5 mg IntraVENous Q10MIN PRN Alistair Randall MD   5 mg at 08/20/20 1218       Past Medical History:   Diagnosis Date    Hypertension        History reviewed. No pertinent surgical history. Allergies   Allergen Reactions    Benadryl [Diphenhydramine Hcl] Hives       Vital signs:    Visit Vitals  /82 (BP 1 Location: Left arm, BP Patient Position: At rest)   Pulse 74   Temp 98.2 °F (36.8 °C)   Resp 21   Ht 5' 3\" (1.6 m)   Wt 74.2 kg (163 lb 9.9 oz)   SpO2 100%   Breastfeeding No   BMI 28.98 kg/m²       Review of Systems:   unobtainable    EXAM: lethargic. Heart is regular. Aphasic. Makes more eye contact today, tries to monitor sounds when asking her name. Still has a profound aphasia. Left gaze pref is less noticeable today. PERRL, right lower facial droop. Flaccid RUE/RLE, power testing non cooperative. DTR's +3 on right. gait deferred, sensory not possible, no tremors. MRI brain showed large left MCA early subacute infarct corresponding to findings on comparison head CT. Echo echocardiogram showed a severely depressed left ventricular ejection fraction at 15 to 20%. CTA was normal.     On NSR on telemetry.      Recent Results (from the past 24 hour(s))   PROTHROMBIN TIME + INR    Collection Time: 08/25/20  2:49 PM   Result Value Ref Range    Prothrombin time 14.1 11.5 - 15.2 sec    INR 1.1 0.8 - 1.2     CBC WITH AUTOMATED DIFF    Collection Time: 08/26/20  2:50 AM   Result Value Ref Range    WBC 12.6 4.6 - 13.2 K/uL    RBC 4.47 4.20 - 5.30 M/uL HGB 12.3 12.0 - 16.0 g/dL    HCT 38.8 35.0 - 45.0 %    MCV 86.8 74.0 - 97.0 FL    MCH 27.5 24.0 - 34.0 PG    MCHC 31.7 31.0 - 37.0 g/dL    RDW 15.4 (H) 11.6 - 14.5 %    PLATELET 691 795 - 512 K/uL    MPV 12.8 (H) 9.2 - 11.8 FL    NEUTROPHILS 66 42 - 75 %    LYMPHOCYTES 23 20 - 51 %    MONOCYTES 10 (H) 2 - 9 %    EOSINOPHILS 1 0 - 5 %    BASOPHILS 0 0 - 3 %    ABS. NEUTROPHILS 8.3 (H) 1.8 - 8.0 K/UL    ABS. LYMPHOCYTES 2.9 0.8 - 3.5 K/UL    ABS. MONOCYTES 1.3 (H) 0 - 1.0 K/UL    ABS. EOSINOPHILS 0.1 0.0 - 0.4 K/UL    ABS. BASOPHILS 0.0 0.0 - 0.06 K/UL    DF MANUAL      PLATELET COMMENTS ADEQUATE PLATELETS      RBC COMMENTS ANISOCYTOSIS  1+       PROTHROMBIN TIME + INR    Collection Time: 08/26/20  2:50 AM   Result Value Ref Range    Prothrombin time 15.2 11.5 - 15.2 sec    INR 1.2 0.8 - 1.2     METABOLIC PANEL, BASIC    Collection Time: 08/26/20  2:50 AM   Result Value Ref Range    Sodium 139 136 - 145 mmol/L    Potassium 3.8 3.5 - 5.5 mmol/L    Chloride 107 100 - 111 mmol/L    CO2 28 21 - 32 mmol/L    Anion gap 4 3.0 - 18 mmol/L    Glucose 137 (H) 74 - 99 mg/dL    BUN 15 7.0 - 18 MG/DL    Creatinine 0.71 0.6 - 1.3 MG/DL    BUN/Creatinine ratio 21 (H) 12 - 20      GFR est AA >60 >60 ml/min/1.73m2    GFR est non-AA >60 >60 ml/min/1.73m2    Calcium 8.8 8.5 - 10.1 MG/DL   PTT    Collection Time: 08/26/20  2:50 AM   Result Value Ref Range    aPTT 94.3 (H) 23.0 - 36.4 SEC         Assessment/Plan: Large left cardioembolic MCA stroke, with severe  congestive heart failure as the likely culprit. She will need chronic anticoagulation. She is now 7 after presentation from her stroke, so going forward anytime from a year oral anticoagulation may be started, once she is off heparin. There is no further need for permissive hypertension, her stroke is complete. Recommend pursuing acute rehab candidate. I do have any additional inpatient neurological recommendations, so will sign off, please reconsult as needed.       PLEASE NOTE:   Portions of this document may have been produced using voice recognition software. Unrecognized errors in transcription may be present. This note will not be viewable in [address]       None

## 2020-08-26 NOTE — PROGRESS NOTES
Problem: Falls - Risk of  Goal: *Absence of Falls  Description: Document John Jamie Fall Risk and appropriate interventions in the flowsheet.   Note: Fall Risk Interventions:  Mobility Interventions: Bed/chair exit alarm, Communicate number of staff needed for ambulation/transfer    Mentation Interventions: Bed/chair exit alarm, Adequate sleep, hydration, pain control    Medication Interventions: Bed/chair exit alarm    Elimination Interventions: Call light in reach    History of Falls Interventions: Consult care management for discharge planning, Evaluate medications/consider consulting pharmacy

## 2020-08-26 NOTE — PROGRESS NOTES
SUBJECTIVE:     Patient is feeling fine. No new issues per nursing. Remains aphasic. Abdominal discomfort present around the PEG tube site. Tolerating tube feeding per nursing.     OBJECTIVE:     /88 (BP 1 Location: Left arm, BP Patient Position: At rest)   Pulse 75   Temp 98.8 °F (37.1 °C)   Resp 20   Ht 5' 3\" (1.6 m)   Wt 74.2 kg (163 lb 9.9 oz)   SpO2 100%   Breastfeeding No   BMI 28.98 kg/m²      General appearance -awake, NAD. Follows simple commands  Chest -diminished air entry noted in bases,no wheezes currently  Heart - S1 and S2 normal  Abdomen - soft, nondistended, Bowel sounds present, abdominal binder and PEG in situ   Neurological -awake, motor strength 0 out of 5 in right upper extremity and right lower extremity and 5 out of 5 in the left upper extremity and 4-5 out of 5 in left lower extremity  Extremities - no pedal edema noted     ASSESSMENT:     1. 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 Aspiration pneumonia, clinically better  3. Dysphagia due to stroke s/p PEG  4. Left-sided PE and bilateral lower extremity DVT status post IVC filter  5. Hypertension  6. Elevated inflammatory biomarkers due to stroke, negative for COVID-19  7. Indeterminate elevation of troponin due to demand ischemia  8. Elevated BNP due to # 11  9. Leukocytosis due to #2 and #4, improved  10. Hypokalemia, BETTER  11. Dilated cardiomyopathy with EF of 15 to 20% and apical thrombus      PLAN:     Continue current management  discussed with neurologist: Can use Lovenox and Coumadin until INR is more than 2  Secondary thrombophilia work-up has been ordered  Discussed with ID: Okay doing antibiotic through the PEG tube for next 3 days    Discussed with patient's both sisters over the phone: They have been informed that patient needs rehab, patient is at risk for having complications from stroke as well as medications, patient has been medically stable to be placed to a rehab place.   If complications happens in near future, patient's prognosis will remain poor. They verbalized understanding about it. Patient can be discharged to skilled nursing facility once  finds the placement. Disclaimer: Sections of this note are dictated using utilizing voice recognition software, which may have resulted in some phonetic based errors in grammar and contents. Even though attempts were made to correct all the mistakes, some may have been missed, and remained in the body of the document. If questions arise, please contact our department. Recent Results (from the past 24 hour(s))   PROTHROMBIN TIME + INR    Collection Time: 08/25/20  2:49 PM   Result Value Ref Range    Prothrombin time 14.1 11.5 - 15.2 sec    INR 1.1 0.8 - 1.2     CBC WITH AUTOMATED DIFF    Collection Time: 08/26/20  2:50 AM   Result Value Ref Range    WBC 12.6 4.6 - 13.2 K/uL    RBC 4.47 4.20 - 5.30 M/uL    HGB 12.3 12.0 - 16.0 g/dL    HCT 38.8 35.0 - 45.0 %    MCV 86.8 74.0 - 97.0 FL    MCH 27.5 24.0 - 34.0 PG    MCHC 31.7 31.0 - 37.0 g/dL    RDW 15.4 (H) 11.6 - 14.5 %    PLATELET 177 551 - 797 K/uL    MPV 12.8 (H) 9.2 - 11.8 FL    NEUTROPHILS 66 42 - 75 %    LYMPHOCYTES 23 20 - 51 %    MONOCYTES 10 (H) 2 - 9 %    EOSINOPHILS 1 0 - 5 %    BASOPHILS 0 0 - 3 %    ABS. NEUTROPHILS 8.3 (H) 1.8 - 8.0 K/UL    ABS. LYMPHOCYTES 2.9 0.8 - 3.5 K/UL    ABS. MONOCYTES 1.3 (H) 0 - 1.0 K/UL    ABS. EOSINOPHILS 0.1 0.0 - 0.4 K/UL    ABS.  BASOPHILS 0.0 0.0 - 0.06 K/UL    DF MANUAL      PLATELET COMMENTS ADEQUATE PLATELETS      RBC COMMENTS ANISOCYTOSIS  1+       PROTHROMBIN TIME + INR    Collection Time: 08/26/20  2:50 AM   Result Value Ref Range    Prothrombin time 15.2 11.5 - 15.2 sec    INR 1.2 0.8 - 1.2     METABOLIC PANEL, BASIC    Collection Time: 08/26/20  2:50 AM   Result Value Ref Range    Sodium 139 136 - 145 mmol/L    Potassium 3.8 3.5 - 5.5 mmol/L    Chloride 107 100 - 111 mmol/L    CO2 28 21 - 32 mmol/L    Anion gap 4 3.0 - 18 mmol/L    Glucose 137 (H) 74 - 99 mg/dL    BUN 15 7.0 - 18 MG/DL    Creatinine 0.71 0.6 - 1.3 MG/DL    BUN/Creatinine ratio 21 (H) 12 - 20      GFR est AA >60 >60 ml/min/1.73m2    GFR est non-AA >60 >60 ml/min/1.73m2    Calcium 8.8 8.5 - 10.1 MG/DL   PTT    Collection Time: 08/26/20  2:50 AM   Result Value Ref Range    aPTT 94.3 (H) 23.0 - 36.4 SEC

## 2020-08-26 NOTE — PROGRESS NOTES
ARU/IPR REFERRAL CONTACT NOTE  [address] [place] [address] for Physical Rehabilitation    RE: [facility]     Thank you for the opportunity to review this patient's case for admission to [address] [place] [address] for Physical Rehabilitation. Based on our pre-admission screening:     [x ] This patient does not meet criteria for admission to [facility] for Physical Rehabilitation due to:    [x ] Too low level, per documentation, patient has not demonstrated tolerance for acute rehabilitation level of intensity. [x ] Lack of dispo: I spoke with the sister Kayleigh Bermeo and she stated that there is no one that can assist her upon DC from ARU. She is interested in applying for Medicaid for her sister. I called Charles Morrison RN CM and notified her or this. Again, Thank you for this referral. Should you have any questions please do not hesitate to call. Sincerely,  Kashif Krishnan. Jared Kelly, [address]   Jared Kelly, STEFAN  Admissions [facility] for Physical Rehabilitation  [phone]

## 2020-08-26 NOTE — PROGRESS NOTES
Problem: Self Care Deficits Care Plan (Adult)  Goal: *Acute Goals and Plan of Care (Insert Text)  Description: Occupational Therapy Goals  Initiated 8/20/2020 within 7 day(s). 1.  Patient will perform grooming with supervision/set-up using LUE. 2.  Patient will perform bed mobility in preparation for self-care with moderate assistance . 3. Patient will follow 70% of commands throughout self-care tasks with minimal cues. 4.  Patient will participate in upper extremity therapeutic exercise/activities with minimal assistance/contact guard assist for 8 minutes. 5.  Patient will perform self-feeding with minimal assistance/contact guard assistance. Prior Level of Function: Patient non-verbal, nodded to some questions, not all, eyes closed throughout evaluation     Outcome: Progressing Towards Goal     Problem: Patient Education: Go to Patient Education Activity  Goal: Patient/Family Education  Outcome: Progressing Towards Goal   OCCUPATIONAL THERAPY TREATMENT    Patient: Shady Mobley (49 y.o. female)  Date: 8/26/2020  Diagnosis: Cellulitis [L03.90]  Cerebrovascular accident (CVA) ([facility] [address] [I63.9]  Elevated troponin I level [R79.89]  Cerebrovascular accident (CVA) ([facility] [address] [I63.9]  CVA (cerebral vascular accident) ([facility] [address] [I63.9]   <principal problem not specified>  Procedure(s) (LRB):  PERCUTANEOUS ENDOSCOPIC GASTROSTOMY TUBE INSERTION (N/A) 1 Day Post-Op  Precautions: Fall, Contact, Skin  PLOF: Patient non-verbal, nodded to some questions, not all, eyes closed throughout evaluation    Chart, occupational therapy assessment, plan of care, and goals were reviewed. ASSESSMENT:  Pt presented supine in bed with HOB elevated upon therapist arrival and cleared to see by [facility] staff. Pt's eyes closed with arousal from verbal stimuli. Tactile stimuli to RUE in prep for ADL grooming tasks, R UE is flaccid.  Pt with continued drowsiness requiring max cueing to stay alert, pt repositioned in bed and left within all needs within reach. Progression toward goals:  []          Improving appropriately and progressing toward goals  [x]          Improving slowly and progressing toward goals  []          Not making progress toward goals and plan of care will be adjusted     PLAN:  Patient continues to benefit from skilled intervention to address the above impairments. Continue treatment per established plan of care. Discharge Recommendations:  SNF  Further Equipment Recommendations for Discharge:  TBA pending progress     SUBJECTIVE:   Pt non verbal able to nob head for yes or no    OBJECTIVE DATA SUMMARY:   Cognitive/Behavioral Status:  Neurologic State: Alert  Orientation Level: Oriented to person  Cognition: Follows commands  Safety/Judgement: Fall prevention      Pain:  0/10  Pain Intervention(s): Medication (see MAR); Rest, Repositioning  Response to intervention: Nurse notified, See doc flow    Activity Tolerance:    Poor  Please refer to the flowsheet for vital signs taken during this treatment. After treatment:   []  Patient left in no apparent distress sitting up in chair  [x]  Patient left in no apparent distress in bed  [x]  Call bell left within reach  [x]  Nursing notified  []  Caregiver present  []  Bed alarm activated    COMMUNICATION/EDUCATION:   [] Role of Occupational Therapy in the acute care setting  [] Home safety education was provided and the patient/caregiver indicated understanding. [] Patient/family have participated as able in working towards goals and plan of care. [] Patient/family agree to work toward stated goals and plan of care. [] Patient understands intent and goals of therapy, but is neutral about his/her participation. [x] Patient is unable to participate in goal setting and plan of care.       Thank you for this referral.  YRIS Galeana  Time Calculation: 9 mins

## 2020-08-26 NOTE — PROGRESS NOTES
CM called pt's sister to discuss SNF options. Pt has been followed by ARU but look like she might not meet their criteria. Pt's sister gave permission for  to send pt's referrals to local [facility]. Pt matched in [facility] and [facility].     Alexandre Lovett RN BSN  Care Manager  [phone]

## 2020-08-26 NOTE — PROGRESS NOTES
3297-Spoke with Surya Woo from [facility]. Pt has been accepted pending insurance auth. Informed MD that pt will need another COVID test per their guidelines of 24 hour prior to admission. Facility will accept rapid tests if possible to collect. CM called nurse station to inform of orders and asked for collection. 8504-Received a call from [address] [place] [address] at [facility]. They are now reviewing this pt's case further. They are concerned about the next level of care. SAR[facility] to them [facility]. However, they are stating that they will not accept medicaid pending if pt happens to need longterm care. Jordin to call this writer back with determination of acceptance.        Anastacio Hurst RN BSN  Care Manager  [phone]

## 2020-08-26 NOTE — PROGRESS NOTES
1004-[facility] sister April's request for potential medicaid assistance. 1016- UAI/LTSS submitted for processing    1100-Spoke with Felipa at [facility]. They are reviewing pt's case.      Saloni Harden RN BSN  Care Manager  [phone]

## 2020-08-26 NOTE — PROGRESS NOTES
[facility] [place].      CARDIOLOGY PROGRESS NOTE  RECS:      1. Acute CVA- with severe right hemiplegia and aphasia. 2. Dilated Cardiomyopathy with EF 15%-20% and new finding of 1.5 mm x 1.6 mm spherical and hypoechoic thrombus present in the left ventricle- started on Coumadin. Not a candidate for LifeVest at this time due to AMS/severe aphasia and right side hemiplegia. If patient condition improves would consider Lifevest. Telemetry reviewed patient maintaining NSR. no arrhthymias seen    3. Elevate troponin-likely demand ischemia in the setting acute CVA, PE and possible pneumonia-  Continue asa. 4. Elevated D-dimer- and acute PE s/p IVC filter  5. COV-19. Not detected. 6. Right sided pneumonia: Concern for aspiration. Better   7. hypertension-controlled continue current medications. 8. Dysphagia- in the setting #1. Now s/p PEG   9. LBBB- old   8. Hypokalemia- resolved   11. AMS- in the setting #1      Tolerating Coreg and Judyann Copper so far quite well. Will not change any medicine today but possibly titrate upwards if possible in the next few days. Continue warfarin for LV clot, DVT and PE. Prognosis is guarded at best.    ASSESSMENT:  Hospital Problems  Never Reviewed          Codes Class Noted POA    Cellulitis ICD-10-CM: L03.90  ICD-9-CM: 682.9  8/19/2020 Unknown        Elevated troponin I level ICD-10-CM: R79.89  ICD-9-CM: 790.6  8/19/2020 Unknown        Cerebrovascular accident (CVA) ([facility] [address] ICD-10-CM: I63.9  ICD-9-CM: 434.91  8/19/2020 Unknown        CVA (cerebral vascular accident) [facility]) ICD-10-CM: I63.9  ICD-9-CM: 434.91  8/19/2020 Unknown                SUBJECTIVE:  No CP or SOB  Unable to communicate severe aphasia. Patient is lethargic has AMS intermittent.  Difficult to assess her level of comprehension         OBJECTIVE:    VS:   Visit Vitals  /74 (BP 1 Location: Right arm, BP Patient Position: At rest)   Pulse 77   Temp 99 °F (37.2 °C)   Resp 22   Ht 5' 3\" (1.6 m)   Wt 74.2 kg (163 lb 9.9 oz)   SpO2 98%   Breastfeeding No   BMI 28.98 kg/m²         Intake/Output Summary (Last 24 hours) at 8/26/2020 1701  Last data filed at 8/26/2020 1052  Gross per 24 hour   Intake 0 ml   Output    Net 0 ml     TELE: normal sinus rhythm    General: alert and in no apparent distress. HENT: Normocephalic, atraumatic. Normal external eye. Neck :  no JVD  Cardiac:  regular rate and rhythm, S1, S2 normal, no murmur, click, rub or gallop  Lungs: clear to auscultation bilaterally, diminished breath sounds R base, L base  Abdomen: Soft, nontender, no masses, NG tube  Neuro: right sided weakness. Extremities:  No c/c/e, peripheral pulses present. Labs: Results:       Chemistry Recent Labs     08/26/20 0250 08/25/20  0300 08/24/20  0514   * 96 175*    143 146*   K 3.8 3.9 3.8    109 114*   CO2 28 29 28   BUN 15 12 14   CREA 0.71 0.74 0.76   CA 8.8 8.7 8.3*   AGAP 4 5 4   BUCR 21* 16 18      CBC w/Diff Recent Labs     08/26/20  0250 08/25/20  0300 08/24/20  0514   WBC 12.6 12.1 15.8*   RBC 4.47 4.54 4.41   HGB 12.3 12.4 12.3   HCT 38.8 40.0 38.9    237 247   GRANS 66 67 78*   LYMPH 23 19* 11*   EOS 1 1 0      Cardiac Enzymes No results for input(s): CPK, CKND1, EMILY in the last 72 hours. No lab exists for component: CKRMB, TROIP   Coagulation Recent Labs     08/26/20  0250 08/25/20  1449 08/24/20  0514   PTP 15.2 14.1  --    INR 1.2 1.1  --    APTT 94.3*  --  135.2*       Lipid Panel Lab Results   Component Value Date/Time    Cholesterol, total 153 08/20/2020 08:12 AM    HDL Cholesterol 45 08/20/2020 08:12 AM    LDL, calculated 88.2 08/20/2020 08:12 AM    VLDL, calculated 19.8 08/20/2020 08:12 AM    Triglyceride 99 08/20/2020 08:12 AM    CHOL/HDL Ratio 3.4 08/20/2020 08:12 AM      BNP No results for input(s): BNPP in the last 72 hours. Liver Enzymes No results for input(s): TP, ALB, TBIL, AP in the last 72 hours.     No lab exists for component: SGOT, GPT, DBIL   Thyroid Studies No results found for: T4, T3U, TSH, TSHEXT, TSHEXT             Carline Ace MD

## 2020-08-26 NOTE — PROGRESS NOTES
[email or web address]  [phone]    Gastroenterology follow up-Progress note    Impression:  1. Oropharyngeal dysphagia - secondary to CVA - sp PEG yesterday  2. Aspiration pneumonia  3. CVA  4. PE and bilat LE DVT s/p IVC filter    Plan:  1. PEG functioning as expected. Recommend replace abdominal binder to prevent displacement of PEG, it was removed due to being too tight. GI to sign off. Thank you for this consultation and the opportunity to participate in the care of this patient. Please do not hesitate to call with any questions or concerns, or should event occur that may necessitate additional GI evaluation. Chief Complaint: dysphagia      Subjective:  PEG site clean and dry. Per RN working as expected without difficulty. ROS: Denies any fevers, chills, rash. General: well developed, well nourished, no acute distress  Eyes: conjunctiva normal, EOM normal  Cardiovascular: heart normal, intact distal pulses, normal rate and regular rhythm  Pulmonary: breath sounds normal and effort normal  Abdominal: appearance normal, PEG present LUQ w/ tube feed currently running.  bowel sounds normal and soft, non-acute, non-tender     Patient Active Problem List   Diagnosis Code    Cellulitis L03.90    Elevated troponin I level R79.89    Cerebrovascular accident (CVA) ([facility] [address] I63.9    CVA (cerebral vascular accident) ([facility] [address] I63.9         Visit Vitals  /88 (BP 1 Location: Left arm, BP Patient Position: At rest)   Pulse 75   Temp 98.8 °F (37.1 °C)   Resp 20   Ht 5' 3\" (1.6 m)   Wt 74.2 kg (163 lb 9.9 oz)   SpO2 100%   Breastfeeding No   BMI 28.98 kg/m²           Intake/Output Summary (Last 24 hours) at 8/26/2020 1242  Last data filed at 8/26/2020 1052  Gross per 24 hour   Intake 0 ml   Output    Net 0 ml       CBC w/Diff    Lab Results   Component Value Date/Time    WBC 12.6 08/26/2020 02:50 AM    RBC 4.47 08/26/2020 02:50 AM    HGB 12.3 08/26/2020 02:50 AM    HCT 38.8 08/26/2020 02:50 AM    MCV 86.8 08/26/2020 02:50 AM    MCH 27.5 08/26/2020 02:50 AM    MCHC 31.7 08/26/2020 02:50 AM    RDW 15.4 (H) 08/26/2020 02:50 AM     08/26/2020 02:50 AM    Lab Results   Component Value Date/Time    GRANS 66 08/26/2020 02:50 AM    LYMPH 23 08/26/2020 02:50 AM    EOS 1 08/26/2020 02:50 AM    BASOS 0 08/26/2020 02:50 AM      Basic Metabolic Profile   Recent Labs     08/26/20  0250  08/24/20  0514      < > 146*   K 3.8   < > 3.8      < > 114*   CO2 28   < > 28   BUN 15   < > 14   CA 8.8   < > 8.3*   MG  --   --  2.4    < > = values in this interval not displayed. Hepatic Function    Lab Results   Component Value Date/Time    ALB 3.5 08/19/2020 03:37 PM    TP 7.8 08/19/2020 03:37 PM     08/19/2020 03:37 PM    No results found for: TBIL       Coags   Recent Labs     08/26/20  0250 08/25/20  1449 08/24/20  0514   PTP 15.2 14.1  --    INR 1.2 1.1  --    APTT 94.3*  --  135.2*               SALMA Lopez  08/26/20, 12:45 PM   [facility]. [email or web address]  Phone: [phone]  Pager: [phone]

## 2020-08-27 LAB
ANION GAP SERPL CALC-SCNC: 6 MMOL/L (ref 3–18)
BUN SERPL-MCNC: 11 MG/DL (ref 7–18)
BUN/CREAT SERPL: 19 (ref 12–20)
CALCIUM SERPL-MCNC: 8 MG/DL (ref 8.5–10.1)
CARDIOLIPIN IGA SER IA-ACNC: <9 APL U/ML (ref 0–11)
CARDIOLIPIN IGG SER IA-ACNC: <9 GPL U/ML (ref 0–14)
CARDIOLIPIN IGM SER IA-ACNC: <9 MPL U/ML (ref 0–12)
CHLORIDE SERPL-SCNC: 106 MMOL/L (ref 100–111)
CO2 SERPL-SCNC: 27 MMOL/L (ref 21–32)
CREAT SERPL-MCNC: 0.57 MG/DL (ref 0.6–1.3)
GLUCOSE SERPL-MCNC: 118 MG/DL (ref 74–99)
HCYS SERPL-SCNC: 11.7 UMOL/L (ref 3.7–13.9)
INR PPP: 1.2 (ref 0.8–1.2)
POTASSIUM SERPL-SCNC: 3.8 MMOL/L (ref 3.5–5.5)
PROTHROMBIN TIME: 15.5 SEC (ref 11.5–15.2)
SODIUM SERPL-SCNC: 139 MMOL/L (ref 136–145)

## 2020-08-27 PROCEDURE — 85610 PROTHROMBIN TIME: CPT

## 2020-08-27 PROCEDURE — 74011250637 HC RX REV CODE- 250/637: Performed by: INTERNAL MEDICINE

## 2020-08-27 PROCEDURE — 74011250636 HC RX REV CODE- 250/636: Performed by: INTERNAL MEDICINE

## 2020-08-27 PROCEDURE — 94640 AIRWAY INHALATION TREATMENT: CPT

## 2020-08-27 PROCEDURE — 97168 OT RE-EVAL EST PLAN CARE: CPT

## 2020-08-27 PROCEDURE — 36415 COLL VENOUS BLD VENIPUNCTURE: CPT

## 2020-08-27 PROCEDURE — 80048 BASIC METABOLIC PNL TOTAL CA: CPT

## 2020-08-27 PROCEDURE — 65270000029 HC RM PRIVATE

## 2020-08-27 PROCEDURE — 97530 THERAPEUTIC ACTIVITIES: CPT

## 2020-08-27 PROCEDURE — 97164 PT RE-EVAL EST PLAN CARE: CPT

## 2020-08-27 PROCEDURE — 97535 SELF CARE MNGMENT TRAINING: CPT

## 2020-08-27 RX ADMIN — WARFARIN SODIUM 5 MG: 5 TABLET ORAL at 17:38

## 2020-08-27 RX ADMIN — ENOXAPARIN SODIUM 70 MG: 80 INJECTION SUBCUTANEOUS at 11:10

## 2020-08-27 RX ADMIN — SACUBITRIL AND VALSARTAN 1 TABLET: 24; 26 TABLET, FILM COATED ORAL at 11:05

## 2020-08-27 RX ADMIN — SACUBITRIL AND VALSARTAN 1 TABLET: 24; 26 TABLET, FILM COATED ORAL at 17:38

## 2020-08-27 RX ADMIN — IPRATROPIUM BROMIDE AND ALBUTEROL 1 PUFF: 20; 100 SPRAY, METERED RESPIRATORY (INHALATION) at 03:25

## 2020-08-27 RX ADMIN — IPRATROPIUM BROMIDE AND ALBUTEROL 1 PUFF: 20; 100 SPRAY, METERED RESPIRATORY (INHALATION) at 08:00

## 2020-08-27 RX ADMIN — CARVEDILOL 6.25 MG: 6.25 TABLET, FILM COATED ORAL at 11:05

## 2020-08-27 RX ADMIN — IPRATROPIUM BROMIDE AND ALBUTEROL 1 PUFF: 20; 100 SPRAY, METERED RESPIRATORY (INHALATION) at 00:00

## 2020-08-27 RX ADMIN — Medication 10 ML: at 07:01

## 2020-08-27 RX ADMIN — AMOXICILLIN AND CLAVULANATE POTASSIUM 1 TABLET: 875; 125 TABLET, FILM COATED ORAL at 11:10

## 2020-08-27 RX ADMIN — Medication 10 ML: at 13:47

## 2020-08-27 RX ADMIN — Medication 1 CAPSULE: at 11:05

## 2020-08-27 RX ADMIN — ASPIRIN 81 MG CHEWABLE TABLET 81 MG: 81 TABLET CHEWABLE at 11:05

## 2020-08-27 NOTE — PROGRESS NOTES
SUBJECTIVE:       Patient appears to be at baseline mental status. She denies pain anywhere. Indicates that she hopes she can go to rehab soon.         OBJECTIVE:     /86 (BP 1 Location: Left arm, BP Patient Position: At rest)   Pulse 76   Temp 98.1 °F (36.7 °C)   Resp 16   Ht 5' 3\" (1.6 m)   Wt 75.7 kg (166 lb 12.8 oz)   SpO2 97%   Breastfeeding No   BMI 29.55 kg/m²      General appearance -awake, NAD. Follows simple commands  Chest -diminished air entry noted in bases,no wheezes currently  Heart - S1 and S2 normal  Abdomen - soft, nondistended, Bowel sounds present, abdominal binder and PEG in situ   Neurological -awake, motor strength 0 out of 5 in right upper extremity and right lower extremity and 5 out of 5 in the left upper extremity and 4-5 out of 5 in left lower extremity  Extremities - no pedal edema noted     ASSESSMENT:     1. 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 Aspiration pneumonia, clinically better  3. Dysphagia due to stroke s/p PEG  4. Left-sided PE and bilateral lower extremity DVT status post IVC filter  5. Hypertension  6. Elevated inflammatory biomarkers due to stroke, negative for COVID-19  7. Indeterminate elevation of troponin due to demand ischemia  8. Elevated BNP due to # 11  9. Leukocytosis due to #2 and #4, improved  10. Hypokalemia, BETTER  11. Dilated cardiomyopathy with EF of 15 to 20% and apical thrombus      PLAN:     Continue current management  discussed with neurologist: Can use Lovenox and Coumadin until INR is more than 2  Secondary thrombophilia work-up has been ordered  Discussed with ID: Okay doing antibiotic through the PEG tube for next 3 days    Discussed with patient's both sisters over the phone: They have been informed that patient needs rehab, patient is at risk for having complications from stroke as well as medications, patient has been medically stable to be placed to a rehab place.   If complications happens in near future, patient's prognosis will remain poor. They verbalized understanding about it. Patient can be discharged to skilled nursing facility once  finds the placement. Disclaimer: Sections of this note are dictated using utilizing voice recognition software, which may have resulted in some phonetic based errors in grammar and contents. Even though attempts were made to correct all the mistakes, some may have been missed, and remained in the body of the document. If questions arise, please contact our department.     Recent Results (from the past 24 hour(s))   SARS-COV-2    Collection Time: 08/26/20  2:00 PM   Result Value Ref Range    Specimen source Nasopharyngeal      COVID-19 rapid test Not detected NOTD      Specimen type NP Swab     HOMOCYSTEINE, PLASMA    Collection Time: 08/26/20  4:38 PM   Result Value Ref Range    Homocysteine, plasma 11.7 3.7 - 13.9 umol/L   PROTHROMBIN TIME + INR    Collection Time: 08/27/20  3:11 AM   Result Value Ref Range    Prothrombin time 15.5 (H) 11.5 - 15.2 sec    INR 1.2 0.8 - 1.2     METABOLIC PANEL, BASIC    Collection Time: 08/27/20  3:11 AM   Result Value Ref Range    Sodium 139 136 - 145 mmol/L    Potassium 3.8 3.5 - 5.5 mmol/L    Chloride 106 100 - 111 mmol/L    CO2 27 21 - 32 mmol/L    Anion gap 6 3.0 - 18 mmol/L    Glucose 118 (H) 74 - 99 mg/dL    BUN 11 7.0 - 18 MG/DL    Creatinine 0.57 (L) 0.6 - 1.3 MG/DL    BUN/Creatinine ratio 19 12 - 20      GFR est AA >60 >60 ml/min/1.73m2    GFR est non-AA >60 >60 ml/min/1.73m2    Calcium 8.0 (L) 8.5 - 10.1 MG/DL

## 2020-08-27 NOTE — PROGRESS NOTES
SARAH spoke with Melinda Dumont at [address] whom accepted pt in [place]. She has the same concerns as [address] [place] [address] SNF regarding pt's disposition after rehab. SARAH explained that family is working on getting the pt Medicaid. Melinda Dumont stated that she will be calling the family to discuss this and go from there.      Cayla Butler RN BSN  Care Manager  [phone]

## 2020-08-27 NOTE — DISCHARGE INSTRUCTIONS
Recc continuous  tube feeds of Jevity 1.5 @ 50ml/hr to provide 1800 kcal, 77 gm protein, 912 mL free water, 100% RDIs w/ 200ml Free H20 flushes q4

## 2020-08-27 NOTE — PROGRESS NOTES
CM has asked PT to see pt early for appropriate documentation for SNF insurance auth.     Judi Lane RN BSN  Care Manager  [phone]

## 2020-08-27 NOTE — PROGRESS NOTES
RESPIRATORY MEDICATION PROTOCOL ASSESSMENT      Patient  [facility]     60 y.o.   female     8/27/2020  10:52 AM    Breath Sounds Pre Procedure:  Breath Sounds Bilateral: Diminished                                            Breath Sounds Post Procedure: Breath Sounds Bilateral: Diminished                                               Breathing pattern: Pre procedure  Breathing Pattern: Regular          Post procedure  Breathing Pattern: Regular    Cough: Pre procedure  Cough: Non-productive               Post procedure Cough: Non-productive    Heart Rate: Pre procedure Pulse: 70           Post procedure Pulse: 70    Resp Rate: Pre procedure  Respirations: 18           Post procedure          Nebulizer Therapy: Current medications Aerosolized Medications: (combivent)       Problem List:   Patient Active Problem List   Diagnosis Code    Cellulitis L03.90    Elevated troponin I level R79.89    Cerebrovascular accident (CVA) ([facility] [address] I63.9    [facility] I63.9       Patient alert and cooperative to use MDI: NO    Home Respiratory Therapy Regimen/Frequency:  NO  Medication   Device  Frequency     SEVERITY INDEX:    ITEM 0 1 2 3 4 Score   Respiratory Pattern and or Rate Regular  10-19 Regular  20-24   24-30    30-34 Severe SOB or   Greater than 35 0   Breath Sounds Clear Occasional Wheeze Mild Wheezing Moderate Wheezing  wheezing/Absent breath sounds 0   Shortness of Breath None Dyspnea on Exertion Dyspnea at Rest Moderate Shortness of Breath at Rest Severe Shortness of Breath - Limited Speech 0       Total Score:  0  Scoring Guidelines  0-4 pts:  PRN-BID   5-7 pts:  BID, TID, QID  8-9 pts:  TID, QID, Q6  10-12 pts:  Q4-Q6  * - Guidelines used with clinical judgement. PRN Treatments can be ordered to supplement scheduled treatments. Regardless of score, frequency should not be less than normal home regimen.     Recommended Order/Frequency:  Changed to PRN    Comments: Respiratory Therapist: Adele Peoples, RT

## 2020-08-27 NOTE — PROGRESS NOTES
[facility]      CARDIOLOGY PROGRESS NOTE  RECS:      1. Acute CVA- with severe right hemiplegia and aphasia. 2. Dilated Cardiomyopathy with EF 15%-20% and new finding of 1.5 mm x 1.6 mm spherical and hypoechoic thrombus present in the left ventricle- started on Coumadin. Not a candidate for LifeVest at this time due to  severe aphasia and right side hemiplegia. If patient condition improves would consider Lifevest. Telemetry reviewed patient maintaining NSR. no arrhthymias seen. On bb and entresto   3. Elevate troponin-likely demand ischemia in the setting acute CVA, PE and possible pneumonia-  Continue asa. 4. Elevated D-dimer- and acute PE s/p IVC filter  5. COV-19. Not detected. 6. Right sided pneumonia: Concern for aspiration. Better   7. hypertension-controlled continue current medications. 8. Dysphagia- in the setting #1. Now s/p PEG   9. LBBB- old   8. Hypokalemia- resolved   11. AMS- in the setting #1        ASSESSMENT:  Hospital Problems  Never Reviewed          Codes Class Noted POA    Cellulitis ICD-10-CM: L03.90  ICD-9-CM: 682.9  8/19/2020 Unknown        Elevated troponin I level ICD-10-CM: R79.89  ICD-9-CM: 790.6  8/19/2020 Unknown        Cerebrovascular accident (CVA) ([facility] [address] ICD-10-CM: I63.9  ICD-9-CM: 434.91  8/19/2020 Unknown        CVA (cerebral vascular accident) [facility]) ICD-10-CM: I63.9  ICD-9-CM: 434.91  8/19/2020 Unknown                SUBJECTIVE:  No CP or SOB  Unable to communicate severe aphasia. Patient is lethargic has AMS intermittent.  Difficult to assess her level of comprehension         OBJECTIVE:    VS:   Visit Vitals  /79 (BP 1 Location: Left arm, BP Patient Position: At rest)   Pulse 73   Temp (!) 96.1 °F (35.6 °C)   Resp 18   Ht 5' 3\" (1.6 m)   Wt 75.7 kg (166 lb 12.8 oz)   SpO2 97%   Breastfeeding No   BMI 29.55 kg/m²       No intake or output data in the 24 hours ending 08/27/20 1625  TELE: normal sinus rhythm    General: alert and in no apparent distress. HENT: Normocephalic, atraumatic. Normal external eye. Neck :  no JVD  Cardiac:  regular rate and rhythm, S1, S2 normal, no murmur, click, rub or gallop  Lungs: clear to auscultation bilaterally, diminished breath sounds R base, L base  Abdomen: Soft, nontender, no masses, NG tube  Neuro: right sided weakness. Extremities:  No c/c/e, peripheral pulses present. Labs: Results:       Chemistry Recent Labs     08/27/20 0311 08/26/20  0250 08/25/20  0300   * 137* 96    139 143   K 3.8 3.8 3.9    107 109   CO2 27 28 29   BUN 11 15 12   CREA 0.57* 0.71 0.74   CA 8.0* 8.8 8.7   AGAP 6 4 5   BUCR 19 21* 16      CBC w/Diff Recent Labs     08/26/20 0250 08/25/20  0300   WBC 12.6 12.1   RBC 4.47 4.54   HGB 12.3 12.4   HCT 38.8 40.0    237   GRANS 66 67   LYMPH 23 19*   EOS 1 1      Cardiac Enzymes No results for input(s): CPK, CKND1, EMILY in the last 72 hours. No lab exists for component: CKRMB, TROIP   Coagulation Recent Labs     08/27/20 0311 08/26/20  0250   PTP 15.5* 15.2   INR 1.2 1.2   APTT  --  94.3*       Lipid Panel Lab Results   Component Value Date/Time    Cholesterol, total 153 08/20/2020 08:12 AM    HDL Cholesterol 45 08/20/2020 08:12 AM    LDL, calculated 88.2 08/20/2020 08:12 AM    VLDL, calculated 19.8 08/20/2020 08:12 AM    Triglyceride 99 08/20/2020 08:12 AM    CHOL/HDL Ratio 3.4 08/20/2020 08:12 AM      BNP No results for input(s): BNPP in the last 72 hours. Liver Enzymes No results for input(s): TP, ALB, TBIL, AP in the last 72 hours.     No lab exists for component: SGOT, GPT, DBIL   Thyroid Studies No results found for: T4, T3U, TSH, TSHEXT, TSHEXT             LEO Syed

## 2020-08-27 NOTE — PROGRESS NOTES
Problem: Self Care Deficits Care Plan (Adult)  Goal: *Acute Goals and Plan of Care (Insert Text)  Description: Occupational Therapy Goals  Initiated 8/20/2020, re-evaluated on 8/27/2020 within 7 day(s). Continue all previously set goals. 1.  Patient will perform grooming with supervision/set-up using LUE. 2.  Patient will perform bed mobility in preparation for self-care with moderate assistance . 3. Patient will follow 70% of commands throughout self-care tasks with minimal cues. 4.  Patient will participate in upper extremity therapeutic exercise/activities with minimal assistance/contact guard assist for 8 minutes. 5.  Patient will perform self-feeding with minimal assistance/contact guard assistance. Prior Level of Function: Patient non-verbal, nodded to some questions, not all, eyes closed throughout evaluation     Outcome: Progressing Towards Goal   OCCUPATIONAL THERAPY RE-EVALUATION    Patient: Jazz Monday [de-identified]60 y.o. female)  Date: 8/27/2020  Primary Diagnosis: Cellulitis [L03.90]  Cerebrovascular accident (CVA) ([address] [I63.9]  Elevated troponin I level [R79.89]  Cerebrovascular accident (CVA) ([address] [I63.9]  CVA (cerebral vascular accident) ([address] [I63.9]  Procedure(s) (LRB):  PERCUTANEOUS ENDOSCOPIC GASTROSTOMY TUBE INSERTION (N/A) 2 Days Post-Op   Precautions:   Fall, Aspiration    ASSESSMENT :  Based on the objective data described below, the patient continues to present with decreased ADLs, decreased functional mobility and muscle weakness/decreased functional use of her dominant RUE. Encouragement and hand over hand assist given for task initiation. PROM and sensory stim provided to RUE. She continues to require mod to max assist for self care activities and total assist for mobility. RUE elevated on pillow and patient left supine in bed with all needs met at end of session. Recommend continued therapy at SNF to maximize her functional independence.      Patient will benefit from skilled intervention to address the above impairments. Patient's rehabilitation potential is considered to be Good  Factors which may influence rehabilitation potential include:   []             None noted  []             Mental ability/status  [x]             Medical condition  []             Home/family situation and support systems  []             Safety awareness  []             Pain tolerance/management  []             Other:      PLAN :  Recommendations and Planned Interventions:   [x]               Self Care Training                  [x]      Therapeutic Activities  [x]               Functional Mobility Training   []      Cognitive Retraining  [x]               Therapeutic Exercises           [x]      Endurance Activities  [x]               Balance Training                    [x]      Neuromuscular Re-Education  [x]               Visual/Perceptual Training     [x]      Home Safety Training  [x]               Patient Education                   [x]      Family Training/Education  []               Other (comment):    Frequency/Duration: Patient will be followed by occupational therapy 1-2 times per day/4-7 days per week to address goals. Discharge Recommendations: Skilled Nursing Facility  Further Equipment Recommendations for Discharge: TBD at next level of care     SUBJECTIVE:   Patient stated Yes.     OBJECTIVE DATA SUMMARY:   Hospital course since last seen and reason for reevaluation: Patient making slow progress toward goals and continues to need assist with all ADLs. Past Medical History:   Diagnosis Date    Hypertension    History reviewed. No pertinent surgical history.   Barriers to Learning/Limitations: yes;  altered mental status (i.e.Sedation, Confusion)  Compensate with: visual, verbal, tactile, kinesthetic cues/model    Home Situation:   Home Situation  Home Environment: Apartment  # Steps to Enter: 0  One/Two Story Residence: One story  Living Alone: Yes  Support Systems: Family member(s)  Patient Expects to be Discharged to[de-identified] Unknown  Current DME Used/Available at Home: None  [x]  Right hand dominant   []  Left hand dominant    Cognitive/Behavioral Status:  Neurologic State: Alert  Orientation Level: Oriented to person  Cognition: Decreased command following  Safety/Judgement: Fall prevention    Skin: Intact on UEs  Edema: None noted in UEs    Vision/Perceptual:    Tracking: Requires cues, head turns, or add eye shifts to track    Acuity: (unable to accurately assess )      Coordination: BUE  Fine Motor Skills-Upper: Left Intact; Right Impaired    Gross Motor Skills-Upper: Left Intact; Right Impaired    Balance:  Sitting: Impaired  Sitting - Static: Poor (constant support)  Sitting - Dynamic: Poor (constant support)    Strength: BUE  Strength: Grossly decreased, non-functional(RUE; LUE 4/5 throughout)    Tone & Sensation: BUE  Tone: Abnormal(RUE hypotonicity)  Sensation: Intact    Range of Motion: BUE  AROM: Grossly decreased, non-functional(RUE; LUE WFL)  PROM: Within functional limits    Functional Mobility and Transfers for ADLs:  Bed Mobility:  Rolling: Maximum assistance;Assist x2  Supine to Sit: Maximum assistance;Assist x2  Sit to Supine: Maximum assistance;Assist x2  Scooting: Maximum assistance;Assist x2  Transfers: Toilet Transfer : (NT)    ADL Assessment:   Feeding: Moderate assistance    Oral Facial Hygiene/Grooming: Moderate assistance(secondary to task intiation and completion)    Bathing: Maximum assistance    Upper Body Dressing: Maximum assistance    Lower Body Dressing: Maximum assistance    Toileting: Total assistance    ADL Intervention:  Patient washed neck and face after washcloth placed in left hand. She was given hand over hand assist for task initiation and was then able to continue without assistance. Assist also given for completion.     Cognitive Retraining  Safety/Judgement: Fall prevention    Pain:  Pain level pre-treatment: 0/10   Pain level post-treatment: 0/10  Pain Intervention(s): NA  Response to intervention: NA    Activity Tolerance:   Good  Please refer to the flowsheet for vital signs taken during this treatment. After treatment:   [] Patient left in no apparent distress sitting up in chair  [x] Patient left in no apparent distress in bed  [x] Call bell left within reach  [x] Nursing notified  [] Caregiver present  [] Bed alarm activated    COMMUNICATION/EDUCATION:   [x] Role of Occupational Therapy in the acute care setting  [x] Home safety education was provided and the patient/caregiver indicated understanding. [x] Patient/family have participated as able in goal setting and plan of care. [x] Patient/family agree to work toward stated goals and plan of care. [] Patient understands intent and goals of therapy, but is neutral about his/her participation. [] Patient is unable to participate in goal setting and plan of care.     Thank you for this referral.  Deshawn Santoro MS OTR/L   Time Calculation: 25 mins

## 2020-08-27 NOTE — PROGRESS NOTES
CM has not heard back from [facility] regarding their decision. CM has now left a andrade message and called [address] who also accepted pt to see if they will begin insurance auth.      Eric Smith RN BSN  Care Manager  [phone]

## 2020-08-27 NOTE — PROGRESS NOTES
Pt cleared and seen for PT re-eval. At this time, recommend SNF as patient still disoriented x4 and only able to tolerate sitting EOB with maxAx1. Note to follow. Thank you.     Daisha Willis DPT

## 2020-08-27 NOTE — PROGRESS NOTES
Infectious Disease progress Note        Reason: Acute pulmonary embolism, suspected COVID-19 infection    Current abx Prior abx   Azithromycin since 8/19-8/20  Zosyn since 8/20-8/26  augmentin since 8/26 Piperacillin/tazobactam 8/19     Lines:       Assessment :     60 y.o.  female  with past medical history significant for hypertension who presented to ED on 8/19/2020 with significant new onset weakness to right side of body. CT head: Acute/subacute large nonhemorrhagic left MCA territory infarction. CTA head and neck 8/19-moderate-sized pulmonary embolism. CXR 8/19- RML infiltrate    Now with worsening leukocytosis. Clinical presentation consistent with aspiration pneumonia in a patient with acute pulmonary embolism, CVA. Worsening leukocytosis likely secondary to aspiration pneumonia along with leukemoid reaction to acute pulmonary embolism. Negative rapid covid test 8/20/2020,negative labcorp covid test 8/20    Clinically better. More alert. Attempts to communicate. Recommendations:    1. Discontinue augmentin after tomorrow am dose  2. Needs frequent suctioning-discussed with nursing  3. Management of pulmonary embolism per primary team  4. Management of CVA per neurology  5.  DC planning per primary team    We will sign off. Follow-up PRN. Thanks  Please call me if any further questions or concerns. HPI:  Feels better. Denies increasing chest pain, shortness of breath or cough. Denies abdominal pain. History reviewed. No pertinent surgical history. Home Medication List      Does not communicate. Detailed review of system not feasible Details   ondansetron hcl (ZOFRAN) 4 mg tablet Take 2 Tabs by mouth every eight (8) hours as needed for Nausea.   Qty: 12 Tab, Refills: 0             Current Facility-Administered Medications   Medication Dose Route Frequency    enoxaparin (LOVENOX) injection 70 mg  1 mg/kg SubCUTAneous Q12H    amoxicillin-clavulanate (AUGMENTIN) 875-125 mg per tablet 1 Tab  1 Tab Oral Q12H    lactobacillus sp. 50 billion cpu (BIO-K PLUS) capsule 1 Cap  1 Cap Per G Tube DAILY    docusate (COLACE) 50 mg/5 mL oral liquid 100 mg  100 mg Per G Tube DAILY    atorvastatin (LIPITOR) tablet 80 mg  80 mg Per G Tube QHS    warfarin (COUMADIN) tablet 5 mg  5 mg Per G Tube QPM    melatonin tablet 3 mg  3 mg Per G Tube QHS PRN    aspirin chewable tablet 81 mg  81 mg Per G Tube DAILY    Warfarin - Physician to dose   Other Q24H    carvediloL (COREG) tablet 6.25 mg  6.25 mg Per G Tube Q12H    sacubitriL-valsartan (ENTRESTO) 24-26 mg tablet 1 Tab  1 Tab Oral BID    ipratropium-albuterol (COMBIVENT RESPIMAT) 20 mcg-100 mcg inhalation spray  1 Puff Inhalation Q4H RT    sodium chloride (NS) flush 5-40 mL  5-40 mL IntraVENous Q8H    sodium chloride (NS) flush 5-40 mL  5-40 mL IntraVENous PRN    acetaminophen (TYLENOL) suppository 650 mg  650 mg Rectal Q4H PRN    labetaloL (NORMODYNE;TRANDATE) 20 mg/4 mL (5 mg/mL) injection 5 mg  5 mg IntraVENous Q10MIN PRN       Allergies: Benadryl [diphenhydramine hcl]    Temp (24hrs), Av.8 °F (37.1 °C), Min:97.8 °F (36.6 °C), Max:99.2 °F (37.3 °C)    Visit Vitals  BP (!) 125/91 (BP 1 Location: Left arm, BP Patient Position: At rest)   Pulse 76   Temp 97.8 °F (36.6 °C)   Resp 18   Ht 5' 3\" (1.6 m)   Wt 75.7 kg (166 lb 12.8 oz)   SpO2 94%   Breastfeeding No   BMI 29.55 kg/m²       ROS: Limited review of systems since patient unable to communicate effectively. Physical Exam:    General:  Alert, cooperative, no acute distress    Pulmonary: wet sounds from upper airway, no wheezing  Cardiovascular: Regular rate and Rhythm. GI:  Soft, Non distended, Non tender. + Bowel sounds. Extremities:  No edema. Psych: Good insight. Not anxious or agitated.   Neurologic: Alert awake, a phasic, motor strength left side 5/5, motor strength right side 0/5    Labs: Results:   Chemistry Recent Labs     20  9415 08/26/20  0250 08/25/20  0300   * 137* 96    139 143   K 3.8 3.8 3.9    107 109   CO2 27 28 29   BUN 11 15 12   CREA 0.57* 0.71 0.74   CA 8.0* 8.8 8.7   AGAP 6 4 5   BUCR 19 21* 16      CBC w/Diff Recent Labs     08/26/20  0250 08/25/20  0300   WBC 12.6 12.1   RBC 4.47 4.54   HGB 12.3 12.4   HCT 38.8 40.0    237   GRANS 66 67   LYMPH 23 19*   EOS 1 1      Microbiology No results for input(s): CULT in the last 72 hours.        RADIOLOGY:    All available imaging studies/reports in [facility] for this admission were reviewed    Dr. Nii Syed, Infectious Disease Specialist  [phone]  August 27, 2020  2:28 PM

## 2020-08-27 NOTE — PROGRESS NOTES
Problem: Mobility Impaired (Adult and Pediatric)  Goal: *Therapy Goal (Edit Goal, Insert Text)  Description: Physical Therapy Goals  Initiated 8/20/2020 and to be accomplished within 7 day(s)  1. Patient will roll side to side in bed with minimal assistance. 2.  Patient will  move from supine to sit and sit to supine with minimal assistance. 2.  Patient will transfer from bed to chair and chair to bed with moderate assistance  using the least restrictive device. 3.  Patient will perform sit to stand with moderate assistance . 4. Patient will sit EOB for 10 minutes with moderate assistance. 4.  Patient will ambulate with moderate assistance  for 25 feet with the least restrictive device. PLOF: Pt non-verbal, limited history. Per chart review, patient was living alone. Outcome: Progressing Towards Goal     PHYSICAL THERAPY RE-EVALUATION    Patient: Artemio Drake (8 y.o. female)  Date: 8/27/2020  Diagnosis: Cellulitis [L03.90]  Cerebrovascular accident (CVA) ([facility] [address] [I63.9]  Elevated troponin I level [R79.89]  Cerebrovascular accident (CVA) ([facility] [address] [I63.9]  CVA (cerebral vascular accident) ([address] [I63.9]   <principal problem not specified>  Procedure(s) (LRB):  PERCUTANEOUS ENDOSCOPIC GASTROSTOMY TUBE INSERTION (N/A) 2 Days Post-Op  Precautions: Fall, Aspiration  PLOF: Unable to determine at this time as pt is disoriented x4 and unable to speak more than 1 word at a time. ASSESSMENT:  Pt presents with decreased strength R UE/LE> L UE/LE, leftward gaze, disorientation, impaired mobility, and inability to follow 2 step commands at this time. RN cleared PT to work with pt. Pt seen with rehab tech to enhance safety for pt. Pt found supine in bed, unable to communicate with clinicians. Pt unable to follow smooth pursuit screen past midline to right side and inconsistent following to left side.  Verbal cueing to move head towards right at end of session, patient able to follow smooth pursuit with more consistency. Pt able to move left arm up to moth to wash with a washcloth, unable to move both right UE/LE on command. When asked orientation questions, patient nodded head to everything said, unable to determine if patient understands what clinicians are saying. L LE moves but with great difficulty. Pt transferred from supine-sit with maxAx2. Once sitting EOB, pt required constant support to sit, presenting with posterior lean. Verbal cueing to lean forward and use abdominals. Pt able to tolerate 5 minutes sitting EOB, requiring less support over time to modAx1. Active assisted LAQ's performed on left LE, unable to perform on R LE. At end of session, pt place sit-supine with maxAx2. Pt presented with uncontrollable salivation from mouth throughout PT session. Pt supine in bed, call bell on left side at end of session. Pt able to say \"close\" when leaving room, meaning to close door on way out. At this time, recommend skilled nursing facility due to deficits noted above. Unable to determine what patients baseline mobility is at this time. Progression toward goals:   []      Improving appropriately and progressing toward goals  [x]      Improving slowly and progressing toward goals  []      Not making progress toward goals and plan of care will be adjusted     PLAN:  Patient continues to benefit from skilled intervention to address the above impairments. Continue treatment per established plan of care. Discharge Recommendations:  Skilled Nursing Facility  Further Equipment Recommendations for Discharge:  TBD at next setting     SUBJECTIVE:   Patient stated Con Alden.     OBJECTIVE DATA SUMMARY:   Critical Behavior:  Neurologic State: Alert, Drowsy  Orientation Level: Disoriented X4  Cognition: Decreased command following  Safety/Judgement: Lack of insight into deficits  Functional Mobility Training:  Bed Mobility:  Rolling: Maximum assistance;Assist x2  Supine to Sit: Maximum assistance;Assist x2  Sit to Supine: Maximum assistance;Assist x2  Scooting: Maximum assistance;Assist x2    Balance:  Sitting: Impaired  Sitting - Static: Poor (constant support)  Sitting - Dynamic: Poor (constant support)     Therapeutic Exercises:   Pt attempted LAQ sitting EOB and ankle pumps in supine. L LE able to perform with active-assist, R LE not moving at this time. EXERCISE   Sets   Reps   Active Active Assist   Passive Self ROM   Comments   Ankle Pumps 1 5  [x] [] [] []    Quad Sets/Glut Sets    [] [] [] [] Hold for 5 secs   Hamstring Sets   [] [] [] []    Short Arc Quads   [] [] [] []    Heel Slides   [] [] [] []    Straight Leg Raises   [] [] [] []    Hip Add   [] [] [] [] Hold for 5 secs, w/ pillow squeeze   Long Arc Quads 1 5 [] [] [] []    Seated Marching   [] [] [] []    Standing Marching   [] [] [] []       [] [] [] []        Pain:  Pain level pre-treatment: 0/10  Pain level post-treatment: 0/10   Pain Intervention(s): Medication (see MAR); Rest, Repositioning   Response to intervention: Nurse notified, See doc flow    Activity Tolerance:   Pt tolerated PT okay but needed increased support for all mobility. Please refer to the flowsheet for vital signs taken during this treatment. After treatment:   [] Patient left in no apparent distress sitting up in chair  [x] Patient left in no apparent distress in bed  [x] Call bell left within reach  [x] Nursing notified  [] Caregiver present  [] Bed alarm activated  [] SCDs applied      COMMUNICATION/EDUCATION:   [x]         Role of Physical Therapy in the acute care setting. [x]         Fall prevention education was provided and the patient/caregiver indicated understanding. [x]         Patient/family have participated as able in working toward goals and plan of care. []         Patient/family agree to work toward stated goals and plan of care. []         Patient understands intent and goals of therapy, but is neutral about his/her participation.   []         Patient is unable to participate in stated goals/plan of care: ongoing with therapy staff.  []         Other:        Jyothi Gallego   Time Calculation: 27 mins

## 2020-08-27 NOTE — PROGRESS NOTES
Patient states only info shared with April, her sister. Does not want to share any info with her parents.

## 2020-08-27 NOTE — ROUTINE PROCESS
{[facility]_VERBAL_RECORDED_WRITTEN:81743::\"Bedside\"} shift change report given to Marycarmen Chappell RN (oncoming nurse) by *** (offgoing nurse). Report included the following information {SBAR REPORTS [id number]}.

## 2020-08-27 NOTE — PROGRESS NOTES
CM called pt's sister April again to let her know that people are trying to call her to arrange services for her sister. She did not answer. SARAH left a detailed message stating the importance of her returning the call to [facility] and to the admissions director of the nursing facility. CM provided her with all numbers to contact them.     Estel Sicard RN BSN  Care Manager  [phone]

## 2020-08-27 NOTE — ROUTINE PROCESS
Bedside and Verbal shift change report given to Fabrice Wilkinson RN (oncoming nurse) by Isis Ratliff RN 
 (offgoing nurse). Report included the following information SBAR and Kardex.

## 2020-08-27 NOTE — CONSULTS
[facility]: [phone]  [facility]: [address] [place] [address] Way: [phone]    Patient Name: Ophelia Gandhi  YOB: 1955    Date of Initial Consult: 8/27/2020   Reason for Consult: care decisions  Requesting Provider: Dr Roseann Palmer   Primary Care Physician: Rubina Sharif MD      SUMMARY:   Ophelia Gandhi is a 60y.o. year old with a past history of hypertension , who was admitted on 8/19/2020 from home with a diagnosis of acute CVA. Current medical issues leading to Palliative Medicine involvement include: 60year old female who was found at home after family had not heard from her in a few days. In the ER she was noted have a CVA with asphasia and dysphagia. Due to the dysphagia she required a PEG tube placed. Palliative medicine is consulted for support and goals of care. PALLIATIVE DIAGNOSES:   1. Goals of care   2. CVA  3. Dysphagia   4. Debility        PLAN:   1. Goals of care We have seen Ms Kerrie Davis several times this admission and discussed with her sister Kayleigh Bermeo via phone. Today Luis Hernandez is much more alert, aphasic nods head yes and no appropriately to simple questions. Noted findings by cardiology for dilated cardiomyopathy with EF 15 to 20 %. She is not a candidate for life vest at this time per cards. There is no AMD on file. Spoke with April, patient's parents are living and I have call and updated them previously in admission. They are however very elderly. Today when I asked patient if it was OK to call April and give her an update she nodded \" yes\", When I asked if she wanted me to update her mother and father she shook her head \" no\". Updated April ( sister). She does understand Luis Hernandez is currently aphasic, not able to care for herself. She understands her heart is also weak. Goals of care remain full code with full interventions. Did share the benefits and burdens in light of her CVA.  Ms Jean-Claude expressed her understanding. She is prayful that Sallie Gregory will continue to improve but does understand additional discussions may need to happen. 2. CVA right sided hemiplegia and aphasia. MRI confirmed a left MCA acute infarct with minimal mass-effect. Neurology has been consulted. They are recommending on going rehab  3. Dysphagia failed ST eval, PEG placed. Tolerating PEG tube feeds. Some coughing noted with oral secretions  4. Debility dense right sided hemiplegia, aphasic, dysphasia. Prior to this event lived at home. Have discussed with April time will tell if she is able post rehab live by herself again. CM has started process for Medicaid    5. Initial consult note routed to primary continuity provider  6. Communicated plan of care with: Palliative IDT, sister in past     Patient/Health Care Proxy Stated Goals: Rehabilitation      TREATMENT PREFERENCES:   Code Status: Full Code    Advance Care Planning:  [] [facility] Interdisciplinary Team has updated the ACP Navigator with [address] and Patient Capacity    Primary Decision [facility] ([address]):   Primary Decision Maker: Castillo Arriaga - Father - [phone]    Primary Decision Maker: Alla Salcido - Mother - [phone]    Secondary Decision Maker: Gama Holliday - Sister - [phone]    Medical Interventions: Full interventions     Artificially Administered Nutrition: Feeding tube long-term, if indicated     Other:  As far as possible, the palliative care team has discussed with patient / health care proxy about goals of care / treatment preferences for patient.      HISTORY:     History obtained from: chart     CHIEF COMPLAINT: CVA    HPI/SUBJECTIVE:    The patient is:   [] Verbal and participatory  [x] Non-participatory due to: CVA  Please see summary     Clinical Pain Assessment (nonverbal scale for nonverbal patients): Clinical Pain Assessment  Severity: 0     Activity (Movement): Lying quietly, normal position    Duration: for how long has pt been experiencing pain (e.g., 2 days, 1 month, years)  Frequency: how often pain is an issue (e.g., several times per day, once every few days, constant)     FUNCTIONAL ASSESSMENT:     Palliative Performance Scale (PPS):  PPS: 40    ECOG  ECOG Status : Completely disabled     PSYCHOSOCIAL/SPIRITUAL SCREENING:      Any spiritual / Latter-day concerns: unable to assess for patient   [] Yes /  [] No    Caregiver Burnout:  [] Yes /  [x] No /  [] No Caregiver Present      Anticipatory grief assessment:  Unable to assess for patient   [] Normal  / [] Maladaptive        REVIEW OF SYSTEMS:     Positive and pertinent negative findings in ROS are noted above in HPI. The following systems were [] reviewed / [x] unable to be reviewed as noted in HPI  Other findings are noted below. Systems: constitutional, ears/nose/mouth/throat, respiratory, gastrointestinal, genitourinary, musculoskeletal, integumentary, neurologic, psychiatric, endocrine. Positive findings noted below. Modified ESAS Completed by: provider           Pain: 0           Dyspnea: 0           Stool Occurrence(s): 1        PHYSICAL EXAM:     Wt Readings from Last 3 Encounters:   08/27/20 75.7 kg (166 lb 12.8 oz)   03/31/19 68 kg (150 lb)     Blood pressure 131/86, pulse 76, temperature 98.1 °F (36.7 °C), resp. rate 16, height 5' 3\" (1.6 m), weight 75.7 kg (166 lb 12.8 oz), SpO2 97 %, not currently breastfeeding. Pain:  Pain Scale 1: Visual  Pain Intensity 1: 0                 Last bowel movement: today    Constitutional: debilitated female who is alert but aphasic  Respiratory: breathing not labored, some coughing with her salvia   Skin: warm, dry  Neurologic: alert nods appropriately to simple questions.  Aphasic, right sided hemiplegia        HISTORY:     Active Problems:    Cellulitis (8/19/2020)      Elevated troponin I level (8/19/2020)      Cerebrovascular accident (CVA) ([facility] [address] (8/19/2020)      CVA (cerebral vascular accident) ([facility] [address] (8/19/2020)      Past Medical History:   Diagnosis Date    Hypertension       History reviewed. No pertinent surgical history. History reviewed. No pertinent family history. History reviewed, no pertinent family history.   Social History     Tobacco Use    Smoking status: Current Every Day Smoker     Packs/day: 1.00    Smokeless tobacco: Never Used   Substance Use Topics    Alcohol use: Yes     Comment: socially     Allergies   Allergen Reactions    Benadryl [Diphenhydramine Hcl] Hives      Current Facility-Administered Medications   Medication Dose Route Frequency    ipratropium-albuterol (COMBIVENT RESPIMAT) 20 mcg-100 mcg inhalation spray  1 Puff Inhalation Q4H PRN    enoxaparin (LOVENOX) injection 70 mg  1 mg/kg SubCUTAneous Q12H    amoxicillin-clavulanate (AUGMENTIN) 875-125 mg per tablet 1 Tab  1 Tab Oral Q12H    lactobacillus sp. 50 billion cpu (BIO-K PLUS) capsule 1 Cap  1 Cap Per G Tube DAILY    docusate (COLACE) 50 mg/5 mL oral liquid 100 mg  100 mg Per G Tube DAILY    atorvastatin (LIPITOR) tablet 80 mg  80 mg Per G Tube QHS    warfarin (COUMADIN) tablet 5 mg  5 mg Per G Tube QPM    melatonin tablet 3 mg  3 mg Per G Tube QHS PRN    aspirin chewable tablet 81 mg  81 mg Per G Tube DAILY    Warfarin - Physician to dose   Other Q24H    carvediloL (COREG) tablet 6.25 mg  6.25 mg Per G Tube Q12H    sacubitriL-valsartan (ENTRESTO) 24-26 mg tablet 1 Tab  1 Tab Oral BID    sodium chloride (NS) flush 5-40 mL  5-40 mL IntraVENous Q8H    sodium chloride (NS) flush 5-40 mL  5-40 mL IntraVENous PRN    acetaminophen (TYLENOL) suppository 650 mg  650 mg Rectal Q4H PRN    labetaloL (NORMODYNE;TRANDATE) 20 mg/4 mL (5 mg/mL) injection 5 mg  5 mg IntraVENous Q10MIN PRN        LAB AND IMAGING FINDINGS:     Lab Results   Component Value Date/Time    WBC 12.6 08/26/2020 02:50 AM    HGB 12.3 08/26/2020 02:50 AM    PLATELET 629 69/59/6982 02:50 AM     Lab Results   Component Value Date/Time    Sodium 139 08/27/2020 03:11 AM    Potassium 3.8 08/27/2020 03:11 AM    Chloride 106 08/27/2020 03:11 AM    CO2 27 08/27/2020 03:11 AM    BUN 11 08/27/2020 03:11 AM    Creatinine 0.57 (L) 08/27/2020 03:11 AM    Calcium 8.0 (L) 08/27/2020 03:11 AM    Magnesium 2.4 08/24/2020 05:14 AM    Phosphorus 3.8 08/20/2020 08:12 AM      Lab Results   Component Value Date/Time    Alk. phosphatase 117 08/19/2020 03:37 PM    Protein, total 7.8 08/19/2020 03:37 PM    Albumin 3.5 08/19/2020 03:37 PM    Globulin 4.3 (H) 08/19/2020 03:37 PM     Lab Results   Component Value Date/Time    INR 1.2 08/27/2020 03:11 AM    Prothrombin time 15.5 (H) 08/27/2020 03:11 AM    aPTT 94.3 (H) 08/26/2020 02:50 AM      Lab Results   Component Value Date/Time    Ferritin 318 08/22/2020 03:22 AM      No results found for: PH, PCO2, PO2  No components found for: [place]   Lab Results   Component Value Date/Time     (H) 08/22/2020 03:22 AM    CK - MB 3.4 08/20/2020 08:12 AM              Total time: 50 minutes   Counseling / coordination time, spent as noted above: 40 minutes   > 50% counseling / coordination: yes with Ms Bermeo     Prolonged service was provided for  []30 min   []75 min in face to face time in the presence of the patient, spent as noted above. Time Start:   Time End:   Note: this can only be billed with 05639 (initial) or 32319 (follow up). If multiple start / stop times, list each separately.

## 2020-08-27 NOTE — PROGRESS NOTES
Comprehensive Nutrition Assessment    Type and Reason for Visit: Reassess, Positive nutrition screen, Consult    Nutrition Recommendations/Plan: EN: Continue w/ current tube feeds      Nutrition Assessment:  tolerating TF at 50ml/hr    Malnutrition Assessment:  Malnutrition Status: At risk for malnutrition (specify)(unable to tolerate oral diet due to dysphagia, altered mentation/confusion)    Context:            Estimated Daily Nutrient Needs:  Energy (kcal):  (P) 8442-7277  Protein (g):  (P) 58-86       Fluid (ml/day):  (P) 0751-7019    Nutrition Related Findings:  (P) +BM 8/26/20; Ascites with routine paracentesis PTA. Aphasic.; labs reviewed      Wounds:    Surgical wound       Current Nutrition Therapies:   DIET TUBE FEEDING Per GI okay to use PEG for TF on 8/25 starting at 1630   Jevity 1.5 @ 50ml/hr     Anthropometric Measures:  · Height:  5' 3\" (160 cm)  · Current Body Wt:  75.3 kg (166 lb)   · Admission Body Wt:  137 lb    · Usual Body Wt:  149 lb 14.6 oz(3/31/19)     · Ideal Body Wt:  115 lbs:  144.3 %   · Adjusted Body Weight:   ; Weight Adjustment for:     · Adjusted BMI:       · BMI Category: Overweight (BMI 25.0-29. 9)       Nutrition Diagnosis:   · (P) Inadequate oral intake related to (P) cognitive or neurological impairment, swallowing difficulty as evidenced by (P) nutrition support-enteral nutrition, swallowing study results    Nutrition Interventions:   Food and/or Nutrient Delivery: (P) Continue tube feeding  Nutrition Education and Counseling: (P) No recommendations at this time  Coordination of Nutrition Care: (P) Continued inpatient monitoring, Speech therapy    Goals:  (P) Patient will tolerate enteral nutrition formula and regimen without difficulty within the next 7 days.        Nutrition Monitoring and Evaluation:   Behavioral-Environmental Outcomes:    Food/Nutrient Intake Outcomes: (P) Enteral nutrition intake/tolerance, Vitamin/mineral intake  Physical Signs/Symptoms Outcomes: (P) Biochemical data, Skin, Weight, GI status, Fluid status or edema    Discharge Planning:    (P) Enteral nutrition     Electronically signed by Ky Horta on 8/27/2020 at 8:20 AM

## 2020-08-28 LAB
APCR PPP: 2.7 RATIO (ref 2.2–3.5)
AT III AG PPP IA-ACNC: 76 % (ref 72–124)
DRVVT MIX, 117894: 40.4 SEC (ref 0–47)
INR PPP: 1.3 (ref 0.8–1.2)
INTERPRETATION, 117893: ABNORMAL
PROT C AG PPP IA-ACNC: 76 % (ref 60–150)
PROT C PPP-ACNC: 78 % (ref 73–180)
PROT S AG ACT/NOR PPP IA: 114 % (ref 60–150)
PROT S FREE AG ACT/NOR PPP IA: 96 % (ref 57–157)
PROTHROMBIN TIME: 16 SEC (ref 11.5–15.2)
SCREEN APTT: 34 SEC (ref 0–51.9)
SCREEN DRVVT: 51.2 SEC (ref 0–47)

## 2020-08-28 PROCEDURE — 74011250637 HC RX REV CODE- 250/637: Performed by: HOSPITALIST

## 2020-08-28 PROCEDURE — 97530 THERAPEUTIC ACTIVITIES: CPT

## 2020-08-28 PROCEDURE — 36415 COLL VENOUS BLD VENIPUNCTURE: CPT

## 2020-08-28 PROCEDURE — 77030040393 HC DRSG OPTIFOAM GENT MDII -B

## 2020-08-28 PROCEDURE — 94762 N-INVAS EAR/PLS OXIMTRY CONT: CPT

## 2020-08-28 PROCEDURE — 74011250636 HC RX REV CODE- 250/636: Performed by: INTERNAL MEDICINE

## 2020-08-28 PROCEDURE — 74011250637 HC RX REV CODE- 250/637: Performed by: INTERNAL MEDICINE

## 2020-08-28 PROCEDURE — 97112 NEUROMUSCULAR REEDUCATION: CPT

## 2020-08-28 PROCEDURE — 85610 PROTHROMBIN TIME: CPT

## 2020-08-28 PROCEDURE — 65270000029 HC RM PRIVATE

## 2020-08-28 PROCEDURE — 77010033678 HC OXYGEN DAILY

## 2020-08-28 PROCEDURE — 77030041247 HC PROTECTOR HEEL HEELMEDIX MDII -B

## 2020-08-28 RX ORDER — ONDANSETRON 2 MG/ML
4 INJECTION INTRAMUSCULAR; INTRAVENOUS
Status: DISCONTINUED | OUTPATIENT
Start: 2020-08-28 | End: 2020-08-29 | Stop reason: HOSPADM

## 2020-08-28 RX ORDER — ATORVASTATIN CALCIUM 80 MG/1
80 TABLET, FILM COATED ORAL
Qty: 30 TAB | Refills: 2 | Status: SHIPPED | OUTPATIENT
Start: 2020-08-28

## 2020-08-28 RX ORDER — CARVEDILOL 6.25 MG/1
6.25 TABLET ORAL EVERY 12 HOURS
Qty: 60 TAB | Refills: 2 | Status: SHIPPED | OUTPATIENT
Start: 2020-08-28

## 2020-08-28 RX ORDER — WARFARIN 2.5 MG/1
5 TABLET ORAL DAILY
Qty: 60 TAB | Refills: 2 | Status: SHIPPED | OUTPATIENT
Start: 2020-08-28

## 2020-08-28 RX ORDER — ENOXAPARIN SODIUM 100 MG/ML
1 INJECTION SUBCUTANEOUS EVERY 12 HOURS
Qty: 14 SYRINGE | Refills: 0 | Status: SHIPPED | OUTPATIENT
Start: 2020-08-28 | End: 2020-09-04

## 2020-08-28 RX ORDER — ONDANSETRON 2 MG/ML
INJECTION INTRAMUSCULAR; INTRAVENOUS
Status: DISCONTINUED
Start: 2020-08-28 | End: 2020-08-29 | Stop reason: HOSPADM

## 2020-08-28 RX ORDER — ENOXAPARIN SODIUM 100 MG/ML
1 INJECTION SUBCUTANEOUS EVERY 12 HOURS
Status: DISCONTINUED | OUTPATIENT
Start: 2020-08-28 | End: 2020-08-29 | Stop reason: HOSPADM

## 2020-08-28 RX ORDER — WARFARIN 7.5 MG/1
7.5 TABLET ORAL EVERY EVENING
Status: COMPLETED | OUTPATIENT
Start: 2020-08-28 | End: 2020-08-28

## 2020-08-28 RX ORDER — DOCUSATE SODIUM 50 MG/5ML
100 LIQUID ORAL
Qty: 150 ML | Refills: 0 | Status: SHIPPED | OUTPATIENT
Start: 2020-08-28 | End: 2020-09-27

## 2020-08-28 RX ORDER — GUAIFENESIN 100 MG/5ML
81 LIQUID (ML) ORAL DAILY
Qty: 30 TAB | Refills: 2 | Status: SHIPPED | OUTPATIENT
Start: 2020-08-29

## 2020-08-28 RX ORDER — LANOLIN ALCOHOL/MO/W.PET/CERES
3 CREAM (GRAM) TOPICAL
Qty: 20 TAB | Refills: 0 | Status: SHIPPED | OUTPATIENT
Start: 2020-08-28

## 2020-08-28 RX ADMIN — Medication 10 ML: at 05:19

## 2020-08-28 RX ADMIN — SACUBITRIL AND VALSARTAN 1 TABLET: 24; 26 TABLET, FILM COATED ORAL at 17:37

## 2020-08-28 RX ADMIN — Medication 10 ML: at 23:07

## 2020-08-28 RX ADMIN — MELATONIN 3 MG: at 23:26

## 2020-08-28 RX ADMIN — CARVEDILOL 6.25 MG: 6.25 TABLET, FILM COATED ORAL at 23:07

## 2020-08-28 RX ADMIN — AMOXICILLIN AND CLAVULANATE POTASSIUM 1 TABLET: 875; 125 TABLET, FILM COATED ORAL at 10:10

## 2020-08-28 RX ADMIN — ACETAMINOPHEN 650 MG: 650 SUPPOSITORY RECTAL at 17:04

## 2020-08-28 RX ADMIN — ATORVASTATIN CALCIUM 80 MG: 40 TABLET, FILM COATED ORAL at 00:09

## 2020-08-28 RX ADMIN — ENOXAPARIN SODIUM 70 MG: 80 INJECTION SUBCUTANEOUS at 11:31

## 2020-08-28 RX ADMIN — ATORVASTATIN CALCIUM 80 MG: 40 TABLET, FILM COATED ORAL at 23:07

## 2020-08-28 RX ADMIN — ASPIRIN 81 MG CHEWABLE TABLET 81 MG: 81 TABLET CHEWABLE at 10:10

## 2020-08-28 RX ADMIN — AMOXICILLIN AND CLAVULANATE POTASSIUM 1 TABLET: 875; 125 TABLET, FILM COATED ORAL at 00:10

## 2020-08-28 RX ADMIN — CARVEDILOL 6.25 MG: 6.25 TABLET, FILM COATED ORAL at 00:10

## 2020-08-28 RX ADMIN — SACUBITRIL AND VALSARTAN 1 TABLET: 24; 26 TABLET, FILM COATED ORAL at 10:10

## 2020-08-28 RX ADMIN — ONDANSETRON 4 MG: 2 INJECTION INTRAMUSCULAR; INTRAVENOUS at 23:23

## 2020-08-28 RX ADMIN — ENOXAPARIN SODIUM 70 MG: 80 INJECTION SUBCUTANEOUS at 00:10

## 2020-08-28 RX ADMIN — CARVEDILOL 6.25 MG: 6.25 TABLET, FILM COATED ORAL at 10:10

## 2020-08-28 RX ADMIN — Medication 1 CAPSULE: at 10:10

## 2020-08-28 RX ADMIN — WARFARIN SODIUM 7.5 MG: 7.5 TABLET ORAL at 17:37

## 2020-08-28 RX ADMIN — Medication 10 ML: at 00:11

## 2020-08-28 RX ADMIN — ENOXAPARIN SODIUM 80 MG: 80 INJECTION SUBCUTANEOUS at 23:08

## 2020-08-28 RX ADMIN — AMOXICILLIN AND CLAVULANATE POTASSIUM 1 TABLET: 875; 125 TABLET, FILM COATED ORAL at 23:07

## 2020-08-28 RX ADMIN — Medication 10 ML: at 14:00

## 2020-08-28 NOTE — PROGRESS NOTES
Per Nicole (SARAH) called [facility] scheduled 10:00 am transport to [facility] for tomorrow (Saturday, August 29, 2020) with Pete Rain. Informed Nicole of transportation conversation and arrangements.

## 2020-08-28 NOTE — PROGRESS NOTES
Problem: Falls - Risk of  Goal: *Absence of Falls  Description: Document Soy Nikhil Fall Risk and appropriate interventions in the flowsheet. Outcome: Progressing Towards Goal  Note: Fall Risk Interventions:  Mobility Interventions: Bed/chair exit alarm    Mentation Interventions: Toileting rounds    Medication Interventions: Patient to call before getting OOB    Elimination Interventions: Call light in reach    History of Falls Interventions: Evaluate medications/consider consulting pharmacy         Problem: Patient Education: Go to Patient Education Activity  Goal: Patient/Family Education  Outcome: Progressing Towards Goal     Problem: Patient Education: Go to Patient Education Activity  Goal: Patient/Family Education  Outcome: Progressing Towards Goal     Problem: Patient Education: Go to Patient Education Activity  Goal: Patient/Family Education  Outcome: Progressing Towards Goal     Problem: Patient Education: Go to Patient Education Activity  Goal: Patient/Family Education  Outcome: Progressing Towards Goal     Problem: Patient Education: Go to Patient Education Activity  Goal: Patient/Family Education  Outcome: Progressing Towards Goal     Problem: Nutrition Deficit  Goal: *Optimize nutritional status  Outcome: Progressing Towards Goal     Problem: Pressure Injury - Risk of  Goal: *Prevention of pressure injury  Description: Document Pastor Scale and appropriate interventions in the flowsheet. Outcome: Progressing Towards Goal  Note: Pressure Injury Interventions:  Sensory Interventions: Keep linens dry and wrinkle-free    Moisture Interventions: Absorbent underpads    Activity Interventions: Assess need for specialty bed    Mobility Interventions: Turn and reposition approx.  every two hours(pillow and wedges)    Nutrition Interventions: Document food/fluid/supplement intake    Friction and Shear Interventions: Minimize layers                Problem: Patient Education: Go to Patient Education Activity  Goal: Patient/Family Education  Outcome: Progressing Towards Goal

## 2020-08-28 NOTE — PROGRESS NOTES
Call received from [address] [place] [address],[address]. They received insurance auth and will accept pt tomorrow morning for admission. Transport has been arranged for Sat 8/29 at 10am. PCS has been faxed, packet on the chart. Called and updated the sister April.      Karoline Barkley RN BSN  Care Manager  [phone]

## 2020-08-28 NOTE — PROGRESS NOTES
SUBJECTIVE:     INR 1.3, warfarin dosed. No issues with peg. Patient denies pain anywhere, denies cough or shortness of breath.        OBJECTIVE:     /87 (BP 1 Location: Left arm, BP Patient Position: At rest)   Pulse 75   Temp 97.9 °F (36.6 °C)   Resp 20   Ht 5' 3\" (1.6 m)   Wt 78.3 kg (172 lb 9.6 oz)   SpO2 100%   Breastfeeding No   BMI 30.57 kg/m²      General appearance -awake, NAD. Follows simple commands  Chest -diminished air entry noted in bases,no wheezes currently  Heart - S1 and S2 normal  Abdomen - soft, nondistended, Bowel sounds present, abdominal binder and PEG in situ   Neurological -awake, motor strength 0 out of 5 in right upper extremity and right lower extremity and 5 out of 5 in the left upper extremity and 4-5 out of 5 in left lower extremity  Extremities - no pedal edema noted     ASSESSMENT:     1. 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 Aspiration pneumonia, clinically better  3. Dysphagia due to stroke s/p PEG  4. Left-sided PE and bilateral lower extremity DVT status post IVC filter  5. Hypertension  6. Elevated inflammatory biomarkers due to stroke, negative for COVID-19  7. Indeterminate elevation of troponin due to demand ischemia  8. Elevated BNP due to # 11  9. Leukocytosis due to #2 and #4, improved  10. Hypokalemia, BETTER  11. Dilated cardiomyopathy with EF of 15 to 20% and apical thrombus   12. Full code. Difficult placement. Per CM, family is working on [employer] ap.     PLAN:     Continue current management  discussed with neurologist: Can use Lovenox and Coumadin until INR is more than 2  Secondary thrombophilia work-up has been ordered  Discussed with ID: Okay doing antibiotic through the PEG tube for next 3 days    Discussed with patient's both sisters over the phone:  They have been informed that patient needs rehab, patient is at risk for having complications from stroke as well as medications, patient has been medically stable to be placed to a rehab place. If complications happens in near future, patient's prognosis will remain poor. They verbalized understanding about it. Patient can be discharged to skilled nursing facility once  finds the placement. Disclaimer: Sections of this note are dictated using utilizing voice recognition software, which may have resulted in some phonetic based errors in grammar and contents. Even though attempts were made to correct all the mistakes, some may have been missed, and remained in the body of the document. If questions arise, please contact our department.     Recent Results (from the past 24 hour(s))   PROTHROMBIN TIME + INR    Collection Time: 08/28/20  3:37 AM   Result Value Ref Range    Prothrombin time 16.0 (H) 11.5 - 15.2 sec    INR 1.3 (H) 0.8 - 1.2

## 2020-08-28 NOTE — PROGRESS NOTES
Problem: Mobility Impaired (Adult and Pediatric)  Goal: *Therapy Goal (Edit Goal, Insert Text)  Description: Physical Therapy Goals  Initiated 8/20/2020 and to be accomplished within 7 day(s)  1. Patient will roll side to side in bed with minimal assistance. 2.  Patient will  move from supine to sit and sit to supine with minimal assistance. 2.  Patient will transfer from bed to chair and chair to bed with moderate assistance  using the least restrictive device. 3.  Patient will perform sit to stand with moderate assistance . 4. Patient will sit EOB for 10 minutes with moderate assistance. 4.  Patient will ambulate with moderate assistance  for 25 feet with the least restrictive device. PLOF: Pt non-verbal, limited history. Per chart review, patient was living alone. Outcome: Progressing Towards Goal     PHYSICAL THERAPY TREATMENT    Patient: Masha Lewis (20 y.o. female)  Date: 8/28/2020  Diagnosis: Cellulitis [L03.90]  Cerebrovascular accident (CVA) ([address] [I63.9]  Elevated troponin I level [R79.89]  Cerebrovascular accident (CVA) ([address] [I63.9]  CVA (cerebral vascular accident) ([address] [I63.9]   <principal problem not specified>  Procedure(s) (LRB):  PERCUTANEOUS ENDOSCOPIC GASTROSTOMY TUBE INSERTION (N/A) 3 Days Post-Op  Precautions: Fall, Aspiration    ASSESSMENT:  Patient cleared by nursing for therapy treatment. Treatment session completed with rehab tech due to assist x2 for bed mobility and to maximize patient safety. Patient presents in supine with HOB elevated and resting comfortably. She awakes to her name and requires constant VC to keep attention. She shakes her head \"yes\" to any questions answered and it is unclear if patient is understanding questions appropriately. Patient did not speak until end of session and said \"okay\". Patient continues to require maximal assistance x2 for supine to sitting.  In sitting, she has poor posture requiring maximal assist x2 initially to correct posterior/right lean and over time progressed to maximal assist x1 to maintain position. Patient able to tolerate sitting edge of bed ~8 minutes before becoming agitated and wanting to return to supine. She was able to weight shift to left elbow with assistance sitting edge of bed x3 trials. Patient returned to supine with maximal assist x2 and total x2 for scooting supine towards head of bed. Patient performed left AAROM activity including heel slides and ankle pumps. Passive ROM for right ankle and knee. Patient frequently required re-attention to task and to open eyes throughout session. Patient repositioned in supine with HOB elevated, feeding resumed, call button/phone within reach, and nursing notified of patient's status. Continue to recommend SNF at discharge. Progression toward goals:   []      Improving appropriately and progressing toward goals  [x]      Improving slowly and progressing toward goals  []      Not making progress toward goals and plan of care will be adjusted     PLAN:  Patient continues to benefit from skilled intervention to address the above impairments. Continue treatment per established plan of care. Discharge Recommendations:  Logan Stevenson  Further Equipment Recommendations for Discharge:  to be determined at next level of care     SUBJECTIVE:   Patient stated Erica Cramp at end of session. OBJECTIVE DATA SUMMARY:   Critical Behavior:  Neurologic State: Drowsy  Orientation Level: Oriented to person  Cognition: Decreased command following, Decreased attention/concentration  Safety/Judgement: Fall prevention  Functional Mobility Training:  Bed Mobility:  Rolling: Maximum assistance;Assist x2  Supine to Sit: Maximum assistance;Assist x2  Sit to Supine: Maximum assistance;Assist x2  Scooting: Maximum assistance; Total assistance(scooting in supine)    Balance:  Sitting: Impaired  Sitting - Static: Poor (constant support)  Sitting - Dynamic: Poor (constant support)     Posture:   Posture (WDL): Exceptions to WDL   Posture Assessment: Forward head;Kyphosis;Rounded shoulders    Neuro Re-Education:  Patient sitting edge of bed ~8 minutes requiring maximal assist x2 initially with sitting and progressed to maximal assist x1. Patient requiring constant VC and tactile cues for upright posture. She demonstrates posterior and right lean in sitting. Patient was able to weight shift to left elbow for ~20 seconds x3 trials with maximal assist for maintaining appropriate position. Therapeutic Exercises:       EXERCISE   Sets   Reps   Active Active Assist   Passive Self ROM   Comments   Ankle Pumps 1 10  [] [x] (L) [x] (R) []    Quad Sets/Glut Sets    [] [] [] [] Hold for 5 secs   Hamstring Sets   [] [] [] []    Short Arc Quads   [] [] [] []    Heel Slides 1 5 [] [x] (L) [x] (R)  []    Straight Leg Raises   [] [] [] []    Hip Add   [] [] [] [] Hold for 5 secs, w/ pillow squeeze   Long Arc Quads   [] [] [] []    Seated Marching   [] [] [] []    Standing Marching   [] [] [] []       [] [] [] []        Pain:  Pain level pre-treatment: unable to verbalize, grimacing with right LE repositioning   Pain level post-treatment: unable to verbalize, grimacing with right LE repositioning  Pain Intervention(s): Medication (see MAR); Rest, Repositioning  Response to intervention: Nurse notified, See doc flow    Activity Tolerance:   Poor   Please refer to the flowsheet for vital signs taken during this treatment. After treatment:   [] Patient left in no apparent distress sitting up in chair  [x] Patient left in no apparent distress in bed  [x] Call bell left within reach  [x] Nursing notified  [] Caregiver present  [] Bed alarm activated  [] SCDs applied      COMMUNICATION/EDUCATION:   [x]         Role of Physical Therapy in the acute care setting. [x]         Fall prevention education was provided and the patient/caregiver indicated understanding.   []         Patient/family have participated as able in working toward goals and plan of care. []         Patient/family agree to work toward stated goals and plan of care. []         Patient understands intent and goals of therapy, but is neutral about his/her participation.   []         Patient is unable to participate in stated goals/plan of care: ongoing with therapy staff.  []         Other:        Gely Hagan PT, DPT    Time Calculation: 24 mins

## 2020-08-28 NOTE — PROGRESS NOTES
conducted an initial consultation and Spiritual Assessment for [facility], who is a 60 y.o.,female. Patient's Primary Language is: [place]. According to the patient's EMR Zoroastrian Affiliation is: No preference. The reason the Patient came to the hospital is:   Patient Active Problem List    Diagnosis Date Noted    Cellulitis 08/19/2020    Elevated troponin I level 08/19/2020    C[address] ([address]A) [address] 08/19/2020    CVA (cerebral vascular accident) ([facility] 08/19/2020        The  provided the following Interventions:  Initiated a relationship of care and support. Explored issues of jessica, belief, spirituality and Mu-ism/ritual needs while hospitalized. Provided chaplaincy education. Provided information about Spiritual Care Services. Offered prayer and assurance of continued prayers on patient's behalf. Chart reviewed. The following outcomes where achieved:  Patient shared limited information about both their medical narrative and spiritual journey/beliefs.  confirmed Patient's Zoroastrian Affiliation. Patient processed feeling about current hospitalization. Patient expressed gratitude for 's visit. Assessment:  Patient does not have any Mu-ism/cultural needs that will affect patient's preferences in health care. There are no spiritual or Mu-ism issues which require intervention at this time. Plan:  Chaplains will continue to follow and will provide pastoral care on an as needed/requested basis.  recommends bedside caregivers page  on duty if patient shows signs of acute spiritual or emotional distress.     Iraj [address]   [phone]

## 2020-08-28 NOTE — PROGRESS NOTES
[facility] [place].      CARDIOLOGY PROGRESS NOTE  RECS:      1. Acute CVA- with severe right hemiplegia and aphasia. 2. Dilated Cardiomyopathy with EF 15%-20% and new finding of 1.5 mm x 1.6 mm spherical and hypoechoic thrombus present in the left ventricle- started on Coumadin. Not a candidate for LifeVest at this time due to  severe aphasia and right side hemiplegia. If patient condition improves would consider Lifevest. Telemetry reviewed patient maintaining NSR. no arrhthymias seen. On bb and entresto   3. Elevate troponin-likely demand ischemia in the setting acute CVA, PE and possible pneumonia-  Continue asa. 4. Elevated D-dimer- and acute PE s/p IVC filter  5. COV-19. Not detected. 6. Right sided pneumonia: Concern for aspiration. Better   7. hypertension-controlled continue current medications. 8. Dysphagia- in the setting #1. Now s/p PEG   9. LBBB- old   8. Hypokalemia- resolved   11. AMS- in the setting #1      On coumadin/ lovenox. Target INR 2.0-2.5. Continue supportive care. ASSESSMENT:  Hospital Problems  Never Reviewed          Codes Class Noted POA    Cellulitis ICD-10-CM: L03.90  ICD-9-CM: 682.9  8/19/2020 Unknown        Elevated troponin I level ICD-10-CM: R79.89  ICD-9-CM: 790.6  8/19/2020 Unknown        Cerebrovascular accident (CVA) ([facility] ICD-10-CM: I63.9  ICD-9-CM: 434.91  8/19/2020 Unknown        CVA (cerebral vascular accident) [facility]) ICD-10-CM: I63.9  ICD-9-CM: 434.91  8/19/2020 Unknown                SUBJECTIVE:  Denies CP or SOB via non-verbal cues  Unable to communicate severe aphasia. Patient eyes are open and she appears to be alert. Non-verbal communication in tact.          OBJECTIVE:    VS:   Visit Vitals  /87 (BP 1 Location: Left arm, BP Patient Position: At rest)   Pulse 75   Temp 97.9 °F (36.6 °C)   Resp 20   Ht 5' 3\" (1.6 m)   Wt 78.3 kg (172 lb 9.6 oz)   SpO2 100%   Breastfeeding No   BMI 30.57 kg/m²         Intake/Output Summary (Last 24 hours) at 8/28/2020 1315  Last data filed at 8/28/2020 1024  Gross per 24 hour   Intake 2060 ml   Output    Net 2060 ml     TELE: normal sinus rhythm    General: alert and in no apparent distress. HENT: Normocephalic, atraumatic. Normal external eye. Cardiac:  regular rate and rhythm, S1, S2 normal, no murmur, click, rub or gallop  Lungs: clear to auscultation bilaterally, diminished breath sounds R base>L base  Abdomen: Soft, nontender, no masses, NG tube  Neuro: right sided weakness. Extremities:  Radial pulses present, 1+. Labs: Results:       Chemistry Recent Labs     08/27/20  0311 08/26/20  0250   * 137*    139   K 3.8 3.8    107   CO2 27 28   BUN 11 15   CREA 0.57* 0.71   CA 8.0* 8.8   AGAP 6 4   BUCR 19 21*      CBC w/Diff Recent Labs     08/26/20  0250   WBC 12.6   RBC 4.47   HGB 12.3   HCT 38.8      GRANS 66   LYMPH 23   EOS 1      Cardiac Enzymes No results for input(s): CPK, CKND1, EMILY in the last 72 hours. No lab exists for component: CKRMB, TROIP   Coagulation Recent Labs     08/28/20  0337 08/27/20 0311 08/26/20  0250   PTP 16.0* 15.5* 15.2   INR 1.3* 1.2 1.2   APTT  --   --  94.3*       Lipid Panel Lab Results   Component Value Date/Time    Cholesterol, total 153 08/20/2020 08:12 AM    HDL Cholesterol 45 08/20/2020 08:12 AM    LDL, calculated 88.2 08/20/2020 08:12 AM    VLDL, calculated 19.8 08/20/2020 08:12 AM    Triglyceride 99 08/20/2020 08:12 AM    CHOL/HDL Ratio 3.4 08/20/2020 08:12 AM      BNP No results for input(s): BNPP in the last 72 hours. Liver Enzymes No results for input(s): TP, ALB, TBIL, AP in the last 72 hours. No lab exists for component: SGOT, GPT, DBIL   Thyroid Studies No results found for: T4, T3U, TSH, TSHEXT, TSHEXT             David Maza MD supervised  PFM, PGY-2    I have independently evaluated and examined the patient. All relevant labs and testing data's are reviewed.   Care plan discussed and updated after review.     Christina Huber MD

## 2020-08-28 NOTE — ROUTINE PROCESS
Bedside shift change report given to Ariadna Rousseau. RN (oncoming nurse) by Valeri Coelho RN (offgoing nurse). Report included the following information SBAR, Kardex and MAR.

## 2020-08-28 NOTE — ROUTINE PROCESS
Bedside and Verbal shift change report given to Candance Love, RN (oncoming nurse) by Jaguar Mohamud RN 
 (offgoing nurse). Report included the following information SBAR and Kardex.

## 2020-08-28 NOTE — PROGRESS NOTES
Left messages for Daphnie Orozco at [address] and Raheem at [facility] and rehab asking for a call back to discuss SNF admission.        Alexandre Lovett RN BSN  Care Manager  [phone]

## 2020-08-29 VITALS
BODY MASS INDEX: 29.06 KG/M2 | DIASTOLIC BLOOD PRESSURE: 81 MMHG | HEIGHT: 63 IN | OXYGEN SATURATION: 98 % | RESPIRATION RATE: 18 BRPM | SYSTOLIC BLOOD PRESSURE: 119 MMHG | HEART RATE: 69 BPM | WEIGHT: 164 LBS | TEMPERATURE: 98.8 F

## 2020-08-29 LAB
ERYTHROCYTE [DISTWIDTH] IN BLOOD BY AUTOMATED COUNT: 15.2 % (ref 11.6–14.5)
HCT VFR BLD AUTO: 38.1 % (ref 35–45)
HGB BLD-MCNC: 12.2 G/DL (ref 12–16)
INR PPP: 1.3 (ref 0.8–1.2)
MCH RBC QN AUTO: 27.5 PG (ref 24–34)
MCHC RBC AUTO-ENTMCNC: 32 G/DL (ref 31–37)
MCV RBC AUTO: 85.8 FL (ref 74–97)
PLATELET # BLD AUTO: 367 K/UL (ref 135–420)
PMV BLD AUTO: 11.4 FL (ref 9.2–11.8)
PROTHROMBIN TIME: 16.4 SEC (ref 11.5–15.2)
RBC # BLD AUTO: 4.44 M/UL (ref 4.2–5.3)
WBC # BLD AUTO: 9 K/UL (ref 4.6–13.2)

## 2020-08-29 PROCEDURE — 74011250637 HC RX REV CODE- 250/637: Performed by: INTERNAL MEDICINE

## 2020-08-29 PROCEDURE — 85027 COMPLETE CBC AUTOMATED: CPT

## 2020-08-29 PROCEDURE — 85610 PROTHROMBIN TIME: CPT

## 2020-08-29 PROCEDURE — 36415 COLL VENOUS BLD VENIPUNCTURE: CPT

## 2020-08-29 RX ADMIN — ASPIRIN 81 MG CHEWABLE TABLET 81 MG: 81 TABLET CHEWABLE at 09:44

## 2020-08-29 RX ADMIN — SACUBITRIL AND VALSARTAN 1 TABLET: 24; 26 TABLET, FILM COATED ORAL at 09:44

## 2020-08-29 RX ADMIN — Medication 1 CAPSULE: at 09:44

## 2020-08-29 RX ADMIN — Medication 10 ML: at 05:18

## 2020-08-29 RX ADMIN — CARVEDILOL 6.25 MG: 6.25 TABLET, FILM COATED ORAL at 09:44

## 2020-08-29 NOTE — PROGRESS NOTES
Transition of Care Plan to SNF/Rehab    SNF/Rehab Transition:  [facility] and meets criteria for [facility]. Patient will  be transported by [facility] and expected to leave at 10am.    Communication to Patient/Family:  Met with patient and April, sister and they are agreeable to the transition plan. Communication to SNF/Rehab:  Bedside RN, Sunita Dove, has been notified of the transition plan to the facility and to call report (phone number [phone]). Discharge information has been updated on the AVS. And communicated to facility via [facility], or CC link. SNF/Rehab Transition:    PCP/Specialist: Lyly    Nursing Please include all hard scripts for controlled substances, med rec and dc summary, and AVS in packet. Reviewed and confirmed with facility representative, Binu Robles at [facility] they can manage the patient care needs for the following:     Anna Hogue with (X) only those applicable:    Medication:  [x]  Medications will be available at the facility  []  IV Antibiotics   []  Controlled Substance - hard copy to be sent with patient   []  Weekly Labs    Documents:  [] Hard RX  Number sent   [] MAR  [] Kardex  [] AVS  [] Wound care note  [x]Transfer Summary/Discharge Summary    Equipment:  []  CPAP/BiPAP  []  Wound Vacuum  []  Diams or Urinary Device  []  PICC/Central Line  []  Nebulizer  []  Ventilator    Treatment:  []Isolation (for MRSA, VRE, etc.)  []Surgical Drain Management  []Tracheostomy Care  []Dressing Changes  []Dialysis with transportation and chair time  Center Mode of tranpsort   []PEG Care  [x]Oxygen  []Daily Weights for Heart Failure    Dietary:  []Any diet limitations  []Tube Feedings   []Total Parenteral Management (TPN)    Eligible for Medicaid Long Term Services and Supports  Yes:  [] Eligible for medical assistance or will become eligible within 180 days and LTSS completed.    [] Provider/Patient and/or support system has requested screening. [x] LTSS copy provided to patient or responsible party, .  [] LTSS unavailable at discharge will send once processed to SNF provider.  [] LTSS  unavailable at discharged mailed to patient  [] LTSS performed by outside agency  on  with tracking number   No:   [] Private pay and is not financially eligible for Medicaid within the next 180 days. [] Reside out-of-state.   [] A residents of a state owned/operated facility that is licensed  by [address] [facility] or [facility]  [] Enrollment in [facility] hospice services  [] [address] [place] [address]  [] Patient /Family declines to have screening completed or provide financial information for screening          Financial Resources:  Medicaid    [x] Initiated and application pending   [] Full coverage      Advanced Care Plan:  []Surrogate Decision Maker of Care  [x]POA- April sister  [x]Communicated Code Status/ sent -  FULL     Other      ANGELITA Cruz  Case Management  [phone]

## 2020-08-29 NOTE — ROUTINE PROCESS
Bedside and Verbal shift change report given to Nia Mcmahan RN (oncoming nurse) by Mehul Holt RN 
 (offgoing nurse). Report included the following information SBAR and Kardex.

## 2020-08-29 NOTE — DISCHARGE SUMMARY
Discharge Summary    Patient: Shyam Silva MRN: [id number]  CSN: [id number]    YOB: 1955  Age: 60 y.o. Sex: female    DOA: 8/19/2020 LOS:  LOS: 9 days   Discharge Date:      Admission Diagnoses: Cellulitis [L03.90]  Cerebrovascular accident (CVA) ([facility] [address] [I63.9]  Elevated troponin I level [R79.89]  [facility]) ([facility] [I63.9]  CVA (cerebral vascular accident) [facility]) [I63.9]    Discharge Diagnoses:    Problem List as of 8/28/2020 Never Reviewed          Codes Class Noted - Resolved    Cellulitis ICD-10-CM: L03.90  ICD-9-CM: 682.9  8/19/2020 - Present        Elevated troponin I level ICD-10-CM: R79.89  ICD-9-CM: 790.6  8/19/2020 - Present        Cerebrovascular accident (CVA) ([facility] ICD-10-CM: I63.9  ICD-9-CM: 434.91  8/19/2020 - Present        CVA (cerebral vascular accident) [facility]) ICD-10-CM: I63.9  ICD-9-CM: 434.91  8/19/2020 - Present              Reason for Admission  60 y.o.  female who presents with significant new onset weakness to right side of body. Discussed with sister who is primary support stated patient was in her normal state of health when she spoke to her on 08/17/2020. Sister became concerned when she had not heard from patient for a day or so and ultimately required police about welfare check earlier on the day of presentation. Patient was transferred via EMS to [facility] ED. History from patient was severely limited from patient, as she nodded head yes to all questions asked. Sister was with very limited knowledge and stated she did not know what medications patient takes on a regular basis. Only medical history that sister was aware of is that she will periodically have fluid drained off her abdomen but did not know when this last occurred. Sister did not know if patient  had a stroke in the past or other medical history to speak of. Uncertain when patient was last seen by PCP.   Sister was not aware of any sick contacts including any known COVID exposure. Discharge Condition: Fair    PHYSICAL EXAM at discharge. Visit Vitals  /84   Pulse 75   Temp 98.7 °F (37.1 °C)   Resp 19   Ht 5' 3\" (1.6 m)   Wt 78.3 kg (172 lb 9.6 oz)   SpO2 100%   Breastfeeding No   BMI 30.57 kg/m²       General appearance -awake, NAD. Follows simple commands  Chest -diminished air entry noted in bases,no wheezes currently  Heart - S1 and S2 normal  Abdomen - soft, nondistended, Bowel sounds present, abdominal binder and PEG in situ   Neurological -awake, motor strength 0 out of 5 in right upper extremity and right lower extremity and 5 out of 5 in the left upper extremity and 4-5 out of 5 in left lower extremity  Extremities - no pedal edema noted      Hospital Course:   1.  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  Aspiration pneumonia, resolving. Continue aspiration precautions, head of bed greater than 30 degrees at all time. 3.  Dysphagia due to stroke s/p PEG  4.  Left-sided PE and bilateral lower extremity DVT status post IVC filter. Lovenox bridge to Coumadin. Goal INR 2-3  5.  Hypertension controlled on current regimen  6.  Elevated inflammatory biomarkers due to stroke, negative for COVID-19  7.  Indeterminate elevation of troponin due to demand ischemia on asa  8.  Elevated BNP due to # 11  9. Leukocytosis due to #2 and #4, improved  10. Hypokalemia, resolved  11. Dilated Cardiomyopathy with EF 15%-20% and new finding of 1.5 mm x 1.6 mm spherical and hypoechoic thrombus present in the left ventricle- started on Coumadin. Not a candidate for LifeVest at this time due to  severe aphasia and right side hemiplegia. If patient condition improves would consider Lifevest. Telemetry reviewed patient maintaining NSR. no arrhthymias seen. On bb and entresto. 12.  Left bundle branch block, chronic 14. 13.  Acute metabolic encephalopathy, multifactorial she appears to be at new baseline mental status, stable for several days. 14.  Full code.  Difficult placement. Per CM, family is working on [employer]. Consults: Neurology Dr. Aly Jesus care NP Mani Jimenez  Vascular Dr. Umm Ford  ID Dr. Carloz Elkins Diagnostic Studies: labs:   Recent Results (from the past 24 hour(s))   PROTHROMBIN TIME + INR    Collection Time: 08/28/20  3:37 AM   Result Value Ref Range    Prothrombin time 16.0 (H) 11.5 - 15.2 sec    INR 1.3 (H) 0.8 - 1.2        Ref. Range 8/27/2020 03:11 8/28/2020 03:37   Sodium Latest Ref Range: 136 - 145 mmol/L 139    Potassium Latest Ref Range: 3.5 - 5.5 mmol/L 3.8    Chloride Latest Ref Range: 100 - 111 mmol/L 106    CO2 Latest Ref Range: 21 - 32 mmol/L 27    Anion gap Latest Ref Range: 3.0 - 18 mmol/L 6    Glucose Latest Ref Range: 74 - 99 mg/dL 118 (H)    BUN Latest Ref Range: 7.0 - 18 MG/DL 11    Creatinine Latest Ref Range: 0.6 - 1.3 MG/DL 0.57 (L)    BUN/Creatinine ratio Latest Ref Range: 12 - 20   19    Calcium Latest Ref Range: 8.5 - 10.1 MG/DL 8.0 (L)    GFR est non-AA Latest Ref Range: >60 ml/min/1.73m2 >60    GFR est AA Latest Ref Range: >60 ml/min/1.73m2 >60       Ref. Range 8/26/2020 02:50   WBC Latest Ref Range: 4.6 - 13.2 K/uL 12.6   RBC Latest Ref Range: 4.20 - 5.30 M/uL 4.47   HGB Latest Ref Range: 12.0 - 16.0 g/dL 12.3   HCT Latest Ref Range: 35.0 - 45.0 % 38.8   MCV Latest Ref Range: 74.0 - 97.0 FL 86.8   MCH Latest Ref Range: 24.0 - 34.0 PG 27.5   MCHC Latest Ref Range: 31.0 - 37.0 g/dL 31.7   RDW Latest Ref Range: 11.6 - 14.5 % 15.4 (H)   PLATELET Latest Ref Range: 135 - 420 K/uL 290   MPV Latest Ref Range: 9.2 - 11.8 FL 12.8 (H)   NEUTROPHILS Latest Ref Range: 42 - 75 % 66   LYMPHOCYTES Latest Ref Range: 20 - 51 % 23   MONOCYTES Latest Ref Range: 2 - 9 % 10 (H)   EOSINOPHILS Latest Ref Range: 0 - 5 % 1   BASOPHILS Latest Ref Range: 0 - 3 % 0   DF Latest Units:   MANUAL   ABS. NEUTROPHILS Latest Ref Range: 1.8 - 8.0 K/UL 8.3 (H)   ABS. LYMPHOCYTES Latest Ref Range: 0.8 - 3.5 K/UL 2.9   ABS. MONOCYTES Latest Ref Range: 0 - 1.0 K/UL 1.3 (H)   ABS. EOSINOPHILS Latest Ref Range: 0.0 - 0.4 K/UL 0.1       Results     Procedure Component Value Units Date/Time    CULTURE, BLOOD [[id number]] Collected:  08/19/20 2100    Order Status:  Completed Specimen:  Blood Updated:  08/26/20 0751     Special Requests: NO SPECIAL REQUESTS        Culture result: NO GROWTH 6 DAYS       CULTURE, BLOOD [[id number]] Collected:  08/19/20 2040    Order Status:  Completed Specimen:  Blood Updated:  08/26/20 0751     Special Requests: NO SPECIAL REQUESTS        Culture result: NO GROWTH 6 DAYS             COVID-19 rapid test 8/26/2020 Not detected     COVID-19 rapid test 8/20/2020 Not detected          IMAGING  CT Results (most recent):  Results from Hospital Encounter encounter on 08/19/20   CT HEAD WO CONT    Narrative CT brain without enhancement     CPT code: 76927    Indication: Left MCA territory stroke. Technique: Unenhanced 5 mm collimation axial images obtained from the skull base  through the vertex. Dose reduction techniques used: Automated exposure control, adjustment of the  mAs and/or kVp according to patient size, standardized low-dose protocol, and/or  iterative reconstruction technique. Comparison: August 22, 2020. Findings: There is no intracranial hemorrhage. There is no evidence for mass. There is an evolving subacute left MCA territory infarct. The areas of petechial  hemorrhage on prior MRI is less conspicuous. There is no new infarct. There are  mild white matter hypodensities. No extra-axial fluid collections. The  ventricles are symmetric and midline in position. Vascular structures are  symmetric in attenuation. Basilar cisterns are patent. Sinuses: Clear. Orbits: Normal.  Calvarium:Normal.      Impression Impression:    Evolving subacute infarct in the left MCA territory without hemorrhagic  conversion. Previously seen petechial hemorrhage on MRI is not conspicuous on  CT.      MRI Results (most recent):  Results from Hospital Encounter encounter on 08/19/20   MRI BRAIN WO CONT    Narrative MRI BRAIN WITHOUT CONTRAST    PROVIDED REASON FOR EXAM: CVA  Additional History: Patient found down with slurred speech, right hemiparesis  Comparison Studies: Head CT 8/22/20, 8/19/2020    Imaging Technique:     Sequences:  Sagittal,  Coronal and axial T1 weighted, Diffusion Weighted  (DWI), Axial T2 weighted, Axial T2 FLAIR, and SWI/GRE MRI images of the brain. Contrast Material:  NONE    Limiting Factors/Major Artifacts: None. FINDINGS:    Brain Parenchyma: Large left MCA territory early subacute infarct similar to the  comparison CT head. Infarct measures approximately 7 x 4 cm in the axial plane. The infarction involves portions of the left basal ganglia. Extends to the  insular cortex and the lateral frontal and parietal cortex. Approximately 1 m  focus of susceptibility hypointense signal at the ventral aspect of the infarct  consistent with petechial hemorrhage (SWI image 44). No focal acute parenchymal  hematoma. No additional acute or early subacute infarcts. Chronic lacunar  infarcts are present in the left and right thalami. Mild to moderate burden of  increased T2 FLAIR signal changes in the periventricular and subcortical white  matter. No chronic cortical infarcts. No visible masses or midline shift. CSF Spaces:  Normal in size and morphology for the patient's age. No  hydrocephalus. Vascular System:  Grossly patent flow in basilar and internal cerebral arteries. There is a focus of hypointense susceptibility signal in the mid M1 segment left  MCA corresponding to the MCA thrombus seen on CTA head and neck. No findings of  dural sinus thrombosis.      Hemorrhage:  Besides the petechial hemorrhage within the infarct there is a  small chronic microhemorrhage in the right basal ganglia, left of midline  central isa, in the left parietal-occipital junction, and right cerebellum. Other Structures:    Calvarium: No suspicious marrow signal.    Sella: Pituitary is not enlarged. Visualized Upper Cervical Spine: Normal.  Orbits: Normal for non-dedicated exam.  Paranasal Sinuses: No significant paranasal sinus disease. Mastoid Air Cells:  Clear. Impression IMPRESSION:    Large left MCA early subacute infarct corresponding to findings on comparison  head CT. Small area of petechial hemorrhage in the ventral aspect of the infarct  but no focal hematoma. Chronic lacunar infarcts in the left and right thalami. Mild to moderate chronic  microvascular ischemic changes in the cerebral white matter. A few scattered chronic microbleeds with nonspecific distribution. Suspect  changes related to chronic hypertension. DUPLEX LOWER EXT VENOUS BILAT 8/20/2020  · Acute occlusive thrombus present in the right popliteal vein. · Acute occlusive thrombus present in the right gastrocnemius vein. · Acute occlusive thrombus present in the right soleal vein. · Acute occlusive thrombus present in the right peroneal vein. · Acute occlusive thrombus present in the right posterior tibial vein. · No evidence of acute deep vein thrombosis in the left common femoral, superficial femoral, popliteal, posterior tibial, and peroneal veins. The veins were imaged in the transverse and longitudinal planes. The vessels showed normal color filling and compressibility. Doppler interrogation showed phasic and spontaneous flow. CT Head  Contrast For Evaluation Of Cerebral Perfusion. 1. Area of hypoattenuation consistent with acute infarct in the left anterior lateral frontal lobe and adjacent lateral basal ganglia has perfusion data consistent with a completed infarct. 2. Some increased CBV perfusion at the ventral and dorsal margins of the completed infarct suggesting additional areas at risk for ischemia.     EKG Results     Procedure 720 Value Units Date/Time    EKG, 12 LEAD, INITIAL [[id number]] Collected:  08/19/20 1636    Order Status:  Completed Updated:  08/20/20 0807     Ventricular Rate 85 BPM      Atrial Rate 85 BPM      P-R Interval 142 ms      QRS Duration 140 ms      Q-T Interval 422 ms      QTC Calculation (Bezet) 502 ms      Calculated P Axis 55 degrees      Calculated R Axis 25 degrees      Calculated T Axis 89 degrees      Diagnosis --     Sinus rhythm with frequent premature ventricular complexes  Possible Left atrial enlargement  Left bundle branch block  Nonspecific T wave abnormality  Abnormal ECG  When compared with ECG of 09-FEB-2016 11:42,  No significant change was found  Confirmed by Oral Holden MD, ----- [id number]) on 8/20/2020 8:07:04 AM      EKG, 12 LEAD, SUBSEQUENT [[id number]] Collected:  08/19/20 2036    Order Status:  Completed Updated:  08/20/20 0806     Ventricular Rate 96 BPM      Atrial Rate 96 BPM      P-R Interval 152 ms      QRS Duration 142 ms      Q-T Interval 406 ms      QTC Calculation (Bezet) 512 ms      Calculated P Axis 47 degrees      Calculated R Axis 13 degrees      Calculated T Axis 70 degrees      Diagnosis --     Sinus rhythm with occasional premature ventricular complexes  Biatrial enlargement  Left bundle branch block  Abnormal ECG  When compared with ECG of 19-AUG-2020 16:36,  No significant change was found  Confirmed by Oral Holden MD, ----- [id number]) on 8/20/2020 8:06:04 AM            ECHO ADULT COMPLETE W BUBBLE STUDY 8/24/2020  Left Ventricle  Normal wall thickness. Moderately dilated left ventricle. The estimated EF is 15 - 20%. Visually measured ejection fraction. Severely and globally reduced systolic function. There is moderate (grade 2) left ventricular diastolic dysfunction. The findings are consistent with dilated cardiomyopathy. There is a moderate 1.5 mm x 1.6 mm spherical and hypoechoic thrombus. The thrombus is fixed and located in the apex. Wall Scoring  The left ventricular wall motion is globally hypokinetic. Left Atrium  Severely dilated left atrium. Left Atrium volume index is 51 mL/m2. Right Ventricle  Normal cavity size and global systolic function. Right Atrium  Normal cavity size. 12 cm2    Interatrial Septum  Agitated saline contrast study was performed. There was no shunting at baseline or with Valsalva. Aortic Valve  Normal valve structure, no stenosis and no regurgitation. Mitral Valve  Normal valve structure and no stenosis. Trace regurgitation. Tricuspid Valve  Normal valve structure and no stenosis. Trace regurgitation. Pulmonic Valve  Pulmonic valve not well visualized, but normal doppler findings. No stenosis. Aorta  Normal aortic root. Pulmonary Artery  Pulmonary arterial systolic pressure (PASP) is 56 mmHg. Pulmonary hypertension found to be moderate. IVC/Hepatic Veins  Normal structure. Pericardium  No evidence of pericardial effusion. Discharge Medications:     Current Discharge Medication List      START taking these medications    Details   aspirin 81 mg chewable tablet 1 Tab by Per G Tube route daily. Qty: 30 Tab, Refills: 2      atorvastatin (LIPITOR) 80 mg tablet 1 Tab by Per G Tube route nightly. Qty: 30 Tab, Refills: 2      carvediloL (COREG) 6.25 mg tablet 1 Tab by Per G Tube route every twelve (12) hours. Qty: 60 Tab, Refills: 2      docusate (COLACE) 50 mg/5 mL liquid 10 mL by Per G Tube route daily as needed for Other (constipation) for up to 30 days. Qty: 150 mL, Refills: 0      enoxaparin (LOVENOX) 80 mg/0.8 mL injection 80 mg by SubCUTAneous route every twelve (12) hours every twelve (12) hours for 7 days. Qty: 14 Syringe, Refills: 0      ipratropium-albuteroL (COMBIVENT RESPIMAT)  mcg/actuation inhaler Take 1 Puff by inhalation every four (4) hours as needed for Wheezing. Qty: 1 Inhaler, Refills: 2      lactobacillus sp. 50 billion cpu (BIO-K PLUS) 50 billion cell -375 mg cap capsule 1 Cap by Per G Tube route daily.   Qty: 30 Cap, Refills: 0      melatonin 3 mg tablet 1 Tab by Per G Tube route nightly as needed for Insomnia. Qty: 20 Tab, Refills: 0      sacubitril-valsartan (ENTRESTO) 24 mg/26 mg tablet Take 1 Tab by mouth two (2) times a day. Qty: 60 Tab, Refills: 1      warfarin (COUMADIN) 2.5 mg tablet Take 2 Tabs by mouth daily. Adjust dose as needed for goal INR 2 - 3. Qty: 60 Tab, Refills: 2         CONTINUE these medications which have NOT CHANGED    Details   ondansetron hcl (ZOFRAN) 4 mg tablet Take 2 Tabs by mouth every eight (8) hours as needed for Nausea. Qty: 12 Tab, Refills: 0             Activity: PT/OT Eval and Treat. Fall precautions. HOB > 30º at all times. Diet: Jevity 1.5 @ 50mL/hr . Free water 200 mL via peg every 4 hours. Wound Care: Reinforce peg dressing PRN    Follow-up:   1. SNF doctor upon arrival.  2. Dr. Fisher Done 3-4 weeks for stroke  3.  Dr. Jorge Cardozo 2-3 weeks    Minutes spent on discharge: greater than 30

## 2020-08-29 NOTE — PROGRESS NOTES
Received report on pt.from off going RN. Resting quietly in bed on rounds. HOB elevated 30 degrees. Aphasic. Nods head yes and no appropriately. Seems to understand what is going on and follows commands. Tries to voice words but unable to do so. Call bell at side. No acute distress noted. Will cont to monitor fro any changes in status. 1028 Report called to [facility] at [address]. Pt transported with all belongings, clamped PEG tube and dressing to PEG site dry and intact. Pt was also cleaned of inct urine and stool.

## 2020-08-31 LAB — F5 GENE MUT ANL BLD/T: NORMAL

## 2021-03-20 ENCOUNTER — HOSPITAL ENCOUNTER (OUTPATIENT)
Dept: LAB | Age: 66
Discharge: HOME | End: 2021-03-20

## 2021-03-20 PROCEDURE — 85610 PROTHROMBIN TIME: CPT

## 2021-03-22 LAB
INR PPP: 2.8 (ref 0.8–1.2)
PROTHROMBIN TIME: 28.9 SEC (ref 11.5–15.2)

## 2021-06-30 PROBLEM — I51.3 LV (LEFT VENTRICULAR) MURAL THROMBUS: Status: ACTIVE | Noted: 2021-06-30

## 2021-06-30 PROBLEM — R41.82 ALTERED MENTAL STATUS: Status: ACTIVE | Noted: 2021-06-30

## 2021-08-03 PROBLEM — I63.9 CEREBROVASCULAR ACCIDENT (CVA) (HCC): Status: RESOLVED | Noted: 2020-08-19 | Resolved: 2021-08-03

## 2021-11-15 NOTE — PERIOP NOTES
TRANSFER - OUT REPORT:    Verbal report given to Caitie AVILES(name) on [facility]  being transferred to [address] [place] [address](unit) for routine post - op       Report consisted of patients Situation, Background, Assessment and   Recommendations(SBAR). Information from the following report(s) SBAR, Kardex, OR Summary, Procedure Summary, Intake/Output, MAR, Recent Results and Med Rec Status was reviewed with the receiving nurse. Lines:   Peripheral IV 08/19/20 Right Hand (Active)   Site Assessment Clean, dry, & intact 08/25/20 1037   Phlebitis Assessment 0 08/25/20 1037   Infiltration Assessment 0 08/25/20 1037   Dressing Status Clean, dry, & intact 08/25/20 1037   Dressing Type Tape;Transparent 08/25/20 1037   Hub Color/Line Status Pink; Infusing 08/25/20 1037   Action Taken Open ports on tubing capped 08/23/20 1232   Alcohol Cap Used Yes 08/23/20 1232       Peripheral IV 08/23/20 Posterior;Right Hand (Active)   Site Assessment Clean, dry, & intact 08/25/20 1037   Phlebitis Assessment 0 08/25/20 1037   Infiltration Assessment 0 08/25/20 1037   Dressing Status Clean, dry, & intact 08/25/20 1037   Dressing Type Tape;Transparent 08/25/20 1037        Opportunity for questions and clarification was provided.       Patient transported with:   O2 @ 3 liters  Tech Cervical lymphadenopathy

## 2022-06-15 ENCOUNTER — ANESTHESIA EVENT (OUTPATIENT)
Dept: ENDOSCOPY | Age: 67
End: 2022-06-15
Payer: COMMERCIAL

## 2022-06-16 ENCOUNTER — ANESTHESIA (OUTPATIENT)
Dept: ENDOSCOPY | Age: 67
End: 2022-06-16
Payer: COMMERCIAL

## 2022-06-16 ENCOUNTER — HOSPITAL ENCOUNTER (OUTPATIENT)
Age: 67
Setting detail: OUTPATIENT SURGERY
Discharge: NURSING, REHAB OR LONG-TERM CARE | End: 2022-06-16
Attending: INTERNAL MEDICINE | Admitting: INTERNAL MEDICINE
Payer: COMMERCIAL

## 2022-06-16 VITALS
DIASTOLIC BLOOD PRESSURE: 80 MMHG | RESPIRATION RATE: 20 BRPM | SYSTOLIC BLOOD PRESSURE: 118 MMHG | OXYGEN SATURATION: 98 % | BODY MASS INDEX: 23.36 KG/M2 | WEIGHT: 119 LBS | HEART RATE: 60 BPM | TEMPERATURE: 98.4 F | HEIGHT: 60 IN

## 2022-06-16 LAB
COVID-19 RAPID TEST, COVR: NOT DETECTED
INR PPP: 1.7 (ref 0.8–1.2)
PROTHROMBIN TIME: 20.6 SEC (ref 11.5–15.2)
SOURCE, COVRS: NORMAL

## 2022-06-16 PROCEDURE — 85610 PROTHROMBIN TIME: CPT

## 2022-06-16 PROCEDURE — 87635 SARS-COV-2 COVID-19 AMP PRB: CPT

## 2022-06-16 PROCEDURE — 74011000250 HC RX REV CODE- 250: Performed by: NURSE ANESTHETIST, CERTIFIED REGISTERED

## 2022-06-16 PROCEDURE — 74011250636 HC RX REV CODE- 250/636: Performed by: NURSE ANESTHETIST, CERTIFIED REGISTERED

## 2022-06-16 RX ORDER — LIDOCAINE HYDROCHLORIDE 10 MG/ML
0.1 INJECTION, SOLUTION EPIDURAL; INFILTRATION; INTRACAUDAL; PERINEURAL AS NEEDED
Status: DISCONTINUED | OUTPATIENT
Start: 2022-06-16 | End: 2022-06-16 | Stop reason: HOSPADM

## 2022-06-16 RX ORDER — SODIUM CHLORIDE, SODIUM LACTATE, POTASSIUM CHLORIDE, CALCIUM CHLORIDE 600; 310; 30; 20 MG/100ML; MG/100ML; MG/100ML; MG/100ML
50 INJECTION, SOLUTION INTRAVENOUS CONTINUOUS
Status: DISCONTINUED | OUTPATIENT
Start: 2022-06-16 | End: 2022-06-16 | Stop reason: HOSPADM

## 2022-06-16 RX ADMIN — SODIUM CHLORIDE, SODIUM LACTATE, POTASSIUM CHLORIDE, AND CALCIUM CHLORIDE 50 ML/HR: 600; 310; 30; 20 INJECTION, SOLUTION INTRAVENOUS at 07:42

## 2022-06-16 RX ADMIN — FAMOTIDINE 20 MG: 10 INJECTION INTRAVENOUS at 07:50

## 2022-06-16 NOTE — PROCEDURES
After lot of discussion with Patient;s twin sister and younger sister with Dr. Susana Sosa decision was taken to cancel procedure.  Given low EF risk of death cardiac arrest are high and family decided against PEG tube placement    GOPI Falcon MD

## 2022-06-16 NOTE — H&P
Chief Complaint: Weight loss and poor oral intake    History of present illness: According to family and sister who is here today patient has not been eating much. Has had weight loss. No dysphagia but does not take enough orally. PMH:   Past Medical History:   Diagnosis Date    Dilated cardiomyopathy ([address]     Hypertension      Allergies: Allergies   Allergen Reactions    Benadryl [Diphenhydramine Hcl] Hives     Medications:   Current Facility-Administered Medications:     lidocaine (PF) (XYLOCAINE) 10 mg/mL (1 %) injection 0.1 mL, 0.1 mL, SubCUTAneous, PRN, Karla Anderson Min, CRNA    lactated Ringers infusion, 50 mL/hr, IntraVENous, CONTINUOUS, CarThais elwan A, CRNA, Last Rate: 50 mL/hr at 06/16/22 0742, 50 mL/hr at 06/16/22 5144  FH: History reviewed. No pertinent family history. Social:   Social History     Socioeconomic History    Marital status: SINGLE   Tobacco Use    Smoking status: Current Every Day Smoker     Packs/day: 1.00    Smokeless tobacco: Never Used   Substance and Sexual Activity    Alcohol use: Yes     Comment: socially    Drug use: Never     Surgical H: History reviewed. No pertinent surgical history. ROS: Unable to obtain     Physical Exam:   Visit Vitals  /80   Pulse 60   Temp 98.4 °F (36.9 °C)   Resp 20   Ht 5' (1.524 m)   SpO2 98%   Breastfeeding No   BMI 32.46 kg/m²     General appearance: alert, no distress  Eyes: pupils equal and reactive, extraocular eye movements intact  Nodes: No gross adenopathy in neck.   Skin: no spider angiomata, jaundice, palmar erythema   Respiratory: clear to auscultation bilaterally  Cardiovascular: regular heart rate, no murmurs, no JVD, normal rate and regular rhythm  Abdomen: soft, non-tender, liver not enlarged, spleen not palpable, no obvious ascites  Extremities: no muscle wasting, no gross arthritic changes  Neurologic: alert and oriented, cranial nerves grossly intact, no asterixis    Labs:   Recent Results (from the past 24 hour(s))   COVID-19 RAPID TEST    Collection Time: 06/16/22  7:10 AM   Result Value Ref Range    Specimen source Nasopharyngeal      COVID-19 rapid test Not detected NOTD           Imp/ Plan: Will proceed with percutaneous endoscopic gastrostomy tube placement as planned. Risk benefits alternative including but not limited to infection, bleeding, perforation of viscous, allergic reaction and resultant morbidity and mortality was discussed. Risk of death/cardiac arrest due to low EF was discussed with the sister who was here today.      Grecia Rivera MD  [facility], [place]

## (undated) DEVICE — PACK PROCEDURE SURG MAJ W/ BASIN LF

## (undated) DEVICE — SYRINGE MED 25GA 3ML L5/8IN SUBQ PLAS W/ DETACH NDL SFTY

## (undated) DEVICE — KIT GASTMY PERC PEG PULL 20FR -- ENDOVIVE BX/2

## (undated) DEVICE — SYR LR LCK 1ML GRAD NSAF 30ML --

## (undated) DEVICE — 48" PROBE COVER W/GEL, ULTRASOUND, STERILE: Brand: SITE-RITE

## (undated) DEVICE — TRAY PREP DRY W/ PREM GLV 2 APPL 6 SPNG 2 UNDPD 1 OVERWRAP

## (undated) DEVICE — CANNULA ORIG TL CLR W FOAM CUSHIONS AND 14FT SUPL TB 3 CHN

## (undated) DEVICE — LIMB HOLDER, WRIST/ANKLE: Brand: DEROYAL

## (undated) DEVICE — INTRODUCER SHTH 6FR CANN L11CM W/ MINI GWIRE UNIQUE SLIX

## (undated) DEVICE — FLEX ADVANTAGE 3000CC: Brand: FLEX ADVANTAGE

## (undated) DEVICE — NEEDLE ANGIO 1 WALL ULT SMOOTH 19GAX7CM MERIT AVANCE

## (undated) DEVICE — GAUZE,SPONGE,4"X4",16PLY,STRL,LF,10/TRAY: Brand: MEDLINE

## (undated) DEVICE — BNDG ADHESIVE FABRIC 2X3.5IN -- CONVERT TO ITEM 357955

## (undated) DEVICE — DRAPE XR C ARM 41X74IN LF --

## (undated) DEVICE — TAPE ADH CLTH SILK H2O REPELLENT CURAD

## (undated) DEVICE — GLOVE SURG SZ 7.5 L11.73IN FNGR THK9.8MIL STRW LTX POLYMER

## (undated) DEVICE — MEDI-VAC NON-CONDUCTIVE SUCTION TUBING: Brand: CARDINAL HEALTH

## (undated) DEVICE — SYR 50ML LR LCK 1ML GRAD NSAF --

## (undated) DEVICE — GUIDEWIRE VASC L260CM DIA0.035IN TIP L5CM PTFE COAT S STL

## (undated) DEVICE — BITE BLOCK ENDOSCP UNIV AD 6 TO 9.4 MM

## (undated) DEVICE — SYR 10ML LUER LOK 1/5ML GRAD --

## (undated) DEVICE — PREP SKN CHLRAPRP APL 26ML STR --

## (undated) DEVICE — GLOVE SURG SZ 7 L11.33IN FNGR THK9.8MIL STRW LTX POLYMER

## (undated) DEVICE — KIT CLN UP BON SECOURS MARYV

## (undated) DEVICE — SOLUTION IV 1000ML 0.9% SOD CHL